# Patient Record
Sex: MALE | Race: WHITE | URBAN - METROPOLITAN AREA
[De-identification: names, ages, dates, MRNs, and addresses within clinical notes are randomized per-mention and may not be internally consistent; named-entity substitution may affect disease eponyms.]

---

## 2017-01-01 ENCOUNTER — HOSPITAL ENCOUNTER (INPATIENT)
Facility: CLINIC | Age: 82
LOS: 5 days | Discharge: NURSING, REHAB OR LONG-TERM CARE | DRG: 865 | End: 2018-01-03
Attending: EMERGENCY MEDICINE | Admitting: INTERNAL MEDICINE
Payer: COMMERCIAL

## 2017-01-01 ENCOUNTER — RECORDS - HEALTHEAST (OUTPATIENT)
Dept: LAB | Facility: CLINIC | Age: 82
End: 2017-01-01

## 2017-01-01 ENCOUNTER — APPOINTMENT (OUTPATIENT)
Dept: GENERAL RADIOLOGY | Facility: CLINIC | Age: 82
DRG: 865 | End: 2017-01-01
Attending: EMERGENCY MEDICINE
Payer: COMMERCIAL

## 2017-01-01 DIAGNOSIS — E11.8 TYPE 2 DIABETES MELLITUS WITH COMPLICATION, WITHOUT LONG-TERM CURRENT USE OF INSULIN (H): Primary | ICD-10-CM

## 2017-01-01 DIAGNOSIS — J10.1 INFLUENZA A: ICD-10-CM

## 2017-01-01 LAB
ALBUMIN SERPL-MCNC: 2.9 G/DL (ref 3.4–5)
ALBUMIN UR-MCNC: NEGATIVE MG/DL
ALP SERPL-CCNC: 78 U/L (ref 40–150)
ALT SERPL W P-5'-P-CCNC: 14 U/L (ref 0–70)
ANION GAP SERPL CALCULATED.3IONS-SCNC: 4 MMOL/L (ref 3–14)
APPEARANCE UR: ABNORMAL
AST SERPL W P-5'-P-CCNC: 14 U/L (ref 0–45)
BACTERIA #/AREA URNS HPF: ABNORMAL /HPF
BASE EXCESS BLDA CALC-SCNC: 1.7 MMOL/L
BASOPHILS # BLD AUTO: 0.1 10E9/L (ref 0–0.2)
BASOPHILS NFR BLD AUTO: 0.7 %
BILIRUB SERPL-MCNC: 0.4 MG/DL (ref 0.2–1.3)
BILIRUB UR QL STRIP: NEGATIVE
BUN SERPL-MCNC: 35 MG/DL (ref 7–30)
C DIFF TOX B STL QL: NEGATIVE
CALCIUM SERPL-MCNC: 8.5 MG/DL (ref 8.5–10.1)
CHLORIDE SERPL-SCNC: 104 MMOL/L (ref 94–109)
CO2 BLDCOV-SCNC: 31 MMOL/L (ref 21–28)
CO2 SERPL-SCNC: 29 MMOL/L (ref 20–32)
COLOR UR AUTO: YELLOW
CREAT SERPL-MCNC: 1.17 MG/DL (ref 0.66–1.25)
DIFFERENTIAL METHOD BLD: ABNORMAL
EOSINOPHIL # BLD AUTO: 0.1 10E9/L (ref 0–0.7)
EOSINOPHIL NFR BLD AUTO: 0.9 %
ERYTHROCYTE [DISTWIDTH] IN BLOOD BY AUTOMATED COUNT: 12.4 % (ref 10–15)
FLUAV+FLUBV AG SPEC QL: NEGATIVE
FLUAV+FLUBV AG SPEC QL: POSITIVE
GFR SERPL CREATININE-BSD FRML MDRD: 59 ML/MIN/1.7M2
GLUCOSE BLDC GLUCOMTR-MCNC: 133 MG/DL (ref 70–99)
GLUCOSE BLDC GLUCOMTR-MCNC: 135 MG/DL (ref 70–99)
GLUCOSE BLDC GLUCOMTR-MCNC: 170 MG/DL (ref 70–99)
GLUCOSE BLDC GLUCOMTR-MCNC: 180 MG/DL (ref 70–99)
GLUCOSE BLDC GLUCOMTR-MCNC: 186 MG/DL (ref 70–99)
GLUCOSE BLDC GLUCOMTR-MCNC: 192 MG/DL (ref 70–99)
GLUCOSE BLDC GLUCOMTR-MCNC: 205 MG/DL (ref 70–99)
GLUCOSE BLDC GLUCOMTR-MCNC: 206 MG/DL (ref 70–99)
GLUCOSE BLDC GLUCOMTR-MCNC: 212 MG/DL (ref 70–99)
GLUCOSE BLDC GLUCOMTR-MCNC: 221 MG/DL (ref 70–99)
GLUCOSE BLDC GLUCOMTR-MCNC: 227 MG/DL (ref 70–99)
GLUCOSE BLDC GLUCOMTR-MCNC: 232 MG/DL (ref 70–99)
GLUCOSE BLDC GLUCOMTR-MCNC: 240 MG/DL (ref 70–99)
GLUCOSE BLDC GLUCOMTR-MCNC: 257 MG/DL (ref 70–99)
GLUCOSE BLDC GLUCOMTR-MCNC: 270 MG/DL (ref 70–99)
GLUCOSE BLDC GLUCOMTR-MCNC: 287 MG/DL (ref 70–99)
GLUCOSE BLDC GLUCOMTR-MCNC: 311 MG/DL (ref 70–99)
GLUCOSE BLDC GLUCOMTR-MCNC: 321 MG/DL (ref 70–99)
GLUCOSE SERPL-MCNC: 217 MG/DL (ref 70–99)
GLUCOSE UR STRIP-MCNC: NEGATIVE MG/DL
HBA1C MFR BLD: 6.6 % (ref 4.2–6.1)
HBA1C MFR BLD: 8.4 % (ref 4.3–6)
HCO3 BLD-SCNC: 27 MMOL/L (ref 21–28)
HCT VFR BLD AUTO: 39 % (ref 40–53)
HGB BLD-MCNC: 12.8 G/DL (ref 13.3–17.7)
HGB UR QL STRIP: ABNORMAL
IMM GRANULOCYTES # BLD: 0.1 10E9/L (ref 0–0.4)
IMM GRANULOCYTES NFR BLD: 0.9 %
INR PPP: 1.06 (ref 0.86–1.14)
INTERPRETATION ECG - MUSE: NORMAL
KETONES UR STRIP-MCNC: NEGATIVE MG/DL
LACTATE BLD-SCNC: 1.3 MMOL/L (ref 0.7–2.1)
LACTATE BLD-SCNC: 1.4 MMOL/L (ref 0.7–2)
LEUKOCYTE ESTERASE UR QL STRIP: ABNORMAL
LYMPHOCYTES # BLD AUTO: 0.8 10E9/L (ref 0.8–5.3)
LYMPHOCYTES NFR BLD AUTO: 5.9 %
MAGNESIUM SERPL-MCNC: 2 MG/DL (ref 1.6–2.3)
MCH RBC QN AUTO: 31.4 PG (ref 26.5–33)
MCHC RBC AUTO-ENTMCNC: 32.8 G/DL (ref 31.5–36.5)
MCV RBC AUTO: 96 FL (ref 78–100)
MONOCYTES # BLD AUTO: 1.5 10E9/L (ref 0–1.3)
MONOCYTES NFR BLD AUTO: 10.7 %
MRSA DNA SPEC QL NAA+PROBE: NEGATIVE
MUCOUS THREADS #/AREA URNS LPF: PRESENT /LPF
NEUTROPHILS # BLD AUTO: 11.1 10E9/L (ref 1.6–8.3)
NEUTROPHILS NFR BLD AUTO: 80.9 %
NITRATE UR QL: POSITIVE
NRBC # BLD AUTO: 0 10*3/UL
NRBC BLD AUTO-RTO: 0 /100
O2/TOTAL GAS SETTING VFR VENT: NORMAL %
OXYHGB MFR BLD: 95 % (ref 92–100)
PCO2 BLD: 42 MM HG (ref 35–45)
PCO2 BLDV: 46 MM HG (ref 40–50)
PH BLD: 7.41 PH (ref 7.35–7.45)
PH BLDV: 7.44 PH (ref 7.32–7.43)
PH UR STRIP: 5 PH (ref 5–7)
PHOSPHATE SERPL-MCNC: 3.1 MG/DL (ref 2.5–4.5)
PLATELET # BLD AUTO: 382 10E9/L (ref 150–450)
PO2 BLD: 81 MM HG (ref 80–105)
PO2 BLDV: 22 MM HG (ref 25–47)
POTASSIUM SERPL-SCNC: 4.5 MMOL/L (ref 3.4–5.3)
PROT SERPL-MCNC: 6.9 G/DL (ref 6.8–8.8)
RBC # BLD AUTO: 4.08 10E12/L (ref 4.4–5.9)
RBC #/AREA URNS AUTO: 16 /HPF (ref 0–2)
SAO2 % BLDV FROM PO2: 40 %
SODIUM SERPL-SCNC: 137 MMOL/L (ref 133–144)
SOURCE: ABNORMAL
SP GR UR STRIP: 1.03 (ref 1–1.03)
SPECIMEN SOURCE: ABNORMAL
SPECIMEN SOURCE: NORMAL
SPECIMEN SOURCE: NORMAL
TROPONIN I SERPL-MCNC: 0.04 UG/L (ref 0–0.04)
UROBILINOGEN UR STRIP-MCNC: 0 MG/DL (ref 0–2)
WBC # BLD AUTO: 13.7 10E9/L (ref 4–11)
WBC #/AREA URNS AUTO: 149 /HPF (ref 0–2)

## 2017-01-01 PROCEDURE — 85610 PROTHROMBIN TIME: CPT | Performed by: EMERGENCY MEDICINE

## 2017-01-01 PROCEDURE — 25000128 H RX IP 250 OP 636: Performed by: INTERNAL MEDICINE

## 2017-01-01 PROCEDURE — 25800025 ZZH RX 258: Performed by: INTERNAL MEDICINE

## 2017-01-01 PROCEDURE — 99233 SBSQ HOSP IP/OBS HIGH 50: CPT | Performed by: INTERNAL MEDICINE

## 2017-01-01 PROCEDURE — 40000275 ZZH STATISTIC RCP TIME EA 10 MIN

## 2017-01-01 PROCEDURE — 87493 C DIFF AMPLIFIED PROBE: CPT | Performed by: INTERNAL MEDICINE

## 2017-01-01 PROCEDURE — 94660 CPAP INITIATION&MGMT: CPT

## 2017-01-01 PROCEDURE — 87186 SC STD MICRODIL/AGAR DIL: CPT | Performed by: INTERNAL MEDICINE

## 2017-01-01 PROCEDURE — 20000003 ZZH R&B ICU

## 2017-01-01 PROCEDURE — 12000007 ZZH R&B INTERMEDIATE

## 2017-01-01 PROCEDURE — 87040 BLOOD CULTURE FOR BACTERIA: CPT | Performed by: EMERGENCY MEDICINE

## 2017-01-01 PROCEDURE — 87640 STAPH A DNA AMP PROBE: CPT | Performed by: INTERNAL MEDICINE

## 2017-01-01 PROCEDURE — 99207 ZZC APP CREDIT; MD BILLING SHARED VISIT: CPT | Performed by: INTERNAL MEDICINE

## 2017-01-01 PROCEDURE — 87086 URINE CULTURE/COLONY COUNT: CPT | Performed by: INTERNAL MEDICINE

## 2017-01-01 PROCEDURE — 81001 URINALYSIS AUTO W/SCOPE: CPT | Performed by: INTERNAL MEDICINE

## 2017-01-01 PROCEDURE — 94640 AIRWAY INHALATION TREATMENT: CPT

## 2017-01-01 PROCEDURE — 36600 WITHDRAWAL OF ARTERIAL BLOOD: CPT

## 2017-01-01 PROCEDURE — 94640 AIRWAY INHALATION TREATMENT: CPT | Mod: 76

## 2017-01-01 PROCEDURE — 87641 MR-STAPH DNA AMP PROBE: CPT | Performed by: INTERNAL MEDICINE

## 2017-01-01 PROCEDURE — 82803 BLOOD GASES ANY COMBINATION: CPT

## 2017-01-01 PROCEDURE — 25000131 ZZH RX MED GY IP 250 OP 636 PS 637: Performed by: INTERNAL MEDICINE

## 2017-01-01 PROCEDURE — 40000274 ZZH STATISTIC RCP CONSULT EA 30 MIN

## 2017-01-01 PROCEDURE — 87088 URINE BACTERIA CULTURE: CPT | Performed by: INTERNAL MEDICINE

## 2017-01-01 PROCEDURE — 99223 1ST HOSP IP/OBS HIGH 75: CPT | Mod: AI | Performed by: INTERNAL MEDICINE

## 2017-01-01 PROCEDURE — 25000132 ZZH RX MED GY IP 250 OP 250 PS 637: Performed by: INTERNAL MEDICINE

## 2017-01-01 PROCEDURE — 96360 HYDRATION IV INFUSION INIT: CPT

## 2017-01-01 PROCEDURE — 00000146 ZZHCL STATISTIC GLUCOSE BY METER IP

## 2017-01-01 PROCEDURE — 83605 ASSAY OF LACTIC ACID: CPT

## 2017-01-01 PROCEDURE — 25000128 H RX IP 250 OP 636: Performed by: EMERGENCY MEDICINE

## 2017-01-01 PROCEDURE — 25000125 ZZHC RX 250: Performed by: INTERNAL MEDICINE

## 2017-01-01 PROCEDURE — 93005 ELECTROCARDIOGRAM TRACING: CPT

## 2017-01-01 PROCEDURE — 82805 BLOOD GASES W/O2 SATURATION: CPT | Performed by: EMERGENCY MEDICINE

## 2017-01-01 PROCEDURE — 71010 XR CHEST 1 VW: CPT

## 2017-01-01 PROCEDURE — 83605 ASSAY OF LACTIC ACID: CPT | Performed by: EMERGENCY MEDICINE

## 2017-01-01 PROCEDURE — 40000281 ZZH STATISTIC TRANSPORT TIME EA 15 MIN

## 2017-01-01 PROCEDURE — 99207 ZZC CDG-CRITICAL CARE TIME NOT DOCUMENTED: CPT | Performed by: INTERNAL MEDICINE

## 2017-01-01 PROCEDURE — 36415 COLL VENOUS BLD VENIPUNCTURE: CPT | Performed by: EMERGENCY MEDICINE

## 2017-01-01 PROCEDURE — 87804 INFLUENZA ASSAY W/OPTIC: CPT | Performed by: EMERGENCY MEDICINE

## 2017-01-01 PROCEDURE — 99285 EMERGENCY DEPT VISIT HI MDM: CPT | Mod: 25

## 2017-01-01 PROCEDURE — 93010 ELECTROCARDIOGRAM REPORT: CPT | Performed by: INTERNAL MEDICINE

## 2017-01-01 RX ORDER — ALBUTEROL SULFATE 0.83 MG/ML
2.5 SOLUTION RESPIRATORY (INHALATION) EVERY 4 HOURS PRN
COMMUNITY

## 2017-01-01 RX ORDER — CIPROFLOXACIN 250 MG/1
250 TABLET, FILM COATED ORAL 2 TIMES DAILY
Status: DISCONTINUED | OUTPATIENT
Start: 2017-01-01 | End: 2018-01-01

## 2017-01-01 RX ORDER — PYRIDOSTIGMINE BROMIDE 180 MG/1
180 TABLET, EXTENDED RELEASE ORAL 2 TIMES DAILY
Status: DISCONTINUED | OUTPATIENT
Start: 2017-01-01 | End: 2017-01-01 | Stop reason: RX

## 2017-01-01 RX ORDER — ACETAMINOPHEN 650 MG/1
650 SUPPOSITORY RECTAL EVERY 4 HOURS PRN
Status: DISCONTINUED | OUTPATIENT
Start: 2017-01-01 | End: 2017-01-01

## 2017-01-01 RX ORDER — DEXTROSE MONOHYDRATE 25 G/50ML
25-50 INJECTION, SOLUTION INTRAVENOUS
Status: DISCONTINUED | OUTPATIENT
Start: 2017-01-01 | End: 2018-01-01

## 2017-01-01 RX ORDER — DEXTROSE MONOHYDRATE, SODIUM CHLORIDE, AND POTASSIUM CHLORIDE 50; 1.49; 4.5 G/1000ML; G/1000ML; G/1000ML
INJECTION, SOLUTION INTRAVENOUS CONTINUOUS
Status: DISCONTINUED | OUTPATIENT
Start: 2017-01-01 | End: 2017-01-01

## 2017-01-01 RX ORDER — ALBUTEROL SULFATE 90 UG/1
2 AEROSOL, METERED RESPIRATORY (INHALATION) EVERY 4 HOURS PRN
Status: ON HOLD | COMMUNITY
End: 2018-01-01

## 2017-01-01 RX ORDER — METHYLPREDNISOLONE SODIUM SUCCINATE 40 MG/ML
10 INJECTION, POWDER, LYOPHILIZED, FOR SOLUTION INTRAMUSCULAR; INTRAVENOUS DAILY
Status: DISCONTINUED | OUTPATIENT
Start: 2017-01-01 | End: 2017-01-01

## 2017-01-01 RX ORDER — ACETAMINOPHEN 325 MG/1
650 TABLET ORAL EVERY 4 HOURS PRN
Status: DISCONTINUED | OUTPATIENT
Start: 2017-01-01 | End: 2018-01-01 | Stop reason: HOSPADM

## 2017-01-01 RX ORDER — ONDANSETRON 2 MG/ML
4 INJECTION INTRAMUSCULAR; INTRAVENOUS EVERY 6 HOURS PRN
Status: DISCONTINUED | OUTPATIENT
Start: 2017-01-01 | End: 2018-01-01 | Stop reason: HOSPADM

## 2017-01-01 RX ORDER — OSELTAMIVIR PHOSPHATE 30 MG/1
30 CAPSULE ORAL 2 TIMES DAILY
Status: DISCONTINUED | OUTPATIENT
Start: 2017-01-01 | End: 2018-01-01

## 2017-01-01 RX ORDER — ONDANSETRON 4 MG/1
4 TABLET, ORALLY DISINTEGRATING ORAL EVERY 6 HOURS PRN
Status: DISCONTINUED | OUTPATIENT
Start: 2017-01-01 | End: 2018-01-01 | Stop reason: HOSPADM

## 2017-01-01 RX ORDER — NALOXONE HYDROCHLORIDE 0.4 MG/ML
.1-.4 INJECTION, SOLUTION INTRAMUSCULAR; INTRAVENOUS; SUBCUTANEOUS
Status: DISCONTINUED | OUTPATIENT
Start: 2017-01-01 | End: 2018-01-01 | Stop reason: HOSPADM

## 2017-01-01 RX ORDER — IPRATROPIUM BROMIDE AND ALBUTEROL SULFATE 2.5; .5 MG/3ML; MG/3ML
3 SOLUTION RESPIRATORY (INHALATION) EVERY 6 HOURS PRN
Status: DISCONTINUED | OUTPATIENT
Start: 2017-01-01 | End: 2018-01-01 | Stop reason: HOSPADM

## 2017-01-01 RX ORDER — ALBUTEROL SULFATE 0.83 MG/ML
2.5 SOLUTION RESPIRATORY (INHALATION)
Status: DISCONTINUED | OUTPATIENT
Start: 2017-01-01 | End: 2018-01-01 | Stop reason: HOSPADM

## 2017-01-01 RX ORDER — PYRIDOSTIGMINE BROMIDE 180 MG/1
180 TABLET, EXTENDED RELEASE ORAL 2 TIMES DAILY
Status: DISCONTINUED | OUTPATIENT
Start: 2017-01-01 | End: 2018-01-01 | Stop reason: HOSPADM

## 2017-01-01 RX ORDER — NICOTINE POLACRILEX 4 MG
15-30 LOZENGE BUCCAL
Status: DISCONTINUED | OUTPATIENT
Start: 2017-01-01 | End: 2018-01-01

## 2017-01-01 RX ORDER — CIPROFLOXACIN 2 MG/ML
200 INJECTION, SOLUTION INTRAVENOUS EVERY 12 HOURS
Status: DISCONTINUED | OUTPATIENT
Start: 2017-01-01 | End: 2017-01-01

## 2017-01-01 RX ORDER — IPRATROPIUM BROMIDE AND ALBUTEROL SULFATE 2.5; .5 MG/3ML; MG/3ML
3 SOLUTION RESPIRATORY (INHALATION)
Status: DISCONTINUED | OUTPATIENT
Start: 2017-01-01 | End: 2017-01-01

## 2017-01-01 RX ADMIN — OSELTAMIVIR PHOSPHATE 30 MG: 30 CAPSULE ORAL at 22:06

## 2017-01-01 RX ADMIN — CIPROFLOXACIN 200 MG: 2 INJECTION, SOLUTION INTRAVENOUS at 22:30

## 2017-01-01 RX ADMIN — IPRATROPIUM BROMIDE AND ALBUTEROL SULFATE 3 ML: .5; 3 SOLUTION RESPIRATORY (INHALATION) at 15:33

## 2017-01-01 RX ADMIN — CIPROFLOXACIN HYDROCHLORIDE 250 MG: 250 TABLET, FILM COATED ORAL at 11:46

## 2017-01-01 RX ADMIN — IPRATROPIUM BROMIDE AND ALBUTEROL SULFATE 3 ML: .5; 3 SOLUTION RESPIRATORY (INHALATION) at 15:10

## 2017-01-01 RX ADMIN — INSULIN ASPART 5 UNITS: 100 INJECTION, SOLUTION INTRAVENOUS; SUBCUTANEOUS at 20:06

## 2017-01-01 RX ADMIN — ENOXAPARIN SODIUM 40 MG: 40 INJECTION SUBCUTANEOUS at 10:33

## 2017-01-01 RX ADMIN — INSULIN ASPART 9 UNITS: 100 INJECTION, SOLUTION INTRAVENOUS; SUBCUTANEOUS at 16:01

## 2017-01-01 RX ADMIN — IPRATROPIUM BROMIDE AND ALBUTEROL SULFATE 3 ML: .5; 3 SOLUTION RESPIRATORY (INHALATION) at 08:07

## 2017-01-01 RX ADMIN — IPRATROPIUM BROMIDE AND ALBUTEROL SULFATE 3 ML: .5; 3 SOLUTION RESPIRATORY (INHALATION) at 11:27

## 2017-01-01 RX ADMIN — OSELTAMIVIR PHOSPHATE 30 MG: 30 CAPSULE ORAL at 10:35

## 2017-01-01 RX ADMIN — CIPROFLOXACIN 200 MG: 2 INJECTION, SOLUTION INTRAVENOUS at 22:58

## 2017-01-01 RX ADMIN — INSULIN ASPART 6 UNITS: 100 INJECTION, SOLUTION INTRAVENOUS; SUBCUTANEOUS at 21:51

## 2017-01-01 RX ADMIN — INSULIN ASPART 10 UNITS: 100 INJECTION, SOLUTION INTRAVENOUS; SUBCUTANEOUS at 12:18

## 2017-01-01 RX ADMIN — METHYLPREDNISOLONE SODIUM SUCCINATE 10 MG: 40 INJECTION, POWDER, FOR SOLUTION INTRAMUSCULAR; INTRAVENOUS at 07:50

## 2017-01-01 RX ADMIN — IPRATROPIUM BROMIDE AND ALBUTEROL SULFATE 3 ML: .5; 3 SOLUTION RESPIRATORY (INHALATION) at 07:25

## 2017-01-01 RX ADMIN — INSULIN ASPART 4 UNITS: 100 INJECTION, SOLUTION INTRAVENOUS; SUBCUTANEOUS at 11:57

## 2017-01-01 RX ADMIN — PYRIDOSTIGMINE BROMIDE 180 MG: 180 TABLET, EXTENDED RELEASE ORAL at 21:50

## 2017-01-01 RX ADMIN — INSULIN ASPART 4 UNITS: 100 INJECTION, SOLUTION INTRAVENOUS; SUBCUTANEOUS at 16:48

## 2017-01-01 RX ADMIN — PYRIDOSTIGMINE BROMIDE 180 MG: 180 TABLET, EXTENDED RELEASE ORAL at 07:53

## 2017-01-01 RX ADMIN — INSULIN ASPART 3 UNITS: 100 INJECTION, SOLUTION INTRAVENOUS; SUBCUTANEOUS at 04:39

## 2017-01-01 RX ADMIN — ENOXAPARIN SODIUM 40 MG: 40 INJECTION SUBCUTANEOUS at 11:48

## 2017-01-01 RX ADMIN — IPRATROPIUM BROMIDE AND ALBUTEROL SULFATE 3 ML: .5; 3 SOLUTION RESPIRATORY (INHALATION) at 19:25

## 2017-01-01 RX ADMIN — POTASSIUM CHLORIDE, DEXTROSE MONOHYDRATE AND SODIUM CHLORIDE: 150; 5; 450 INJECTION, SOLUTION INTRAVENOUS at 01:33

## 2017-01-01 RX ADMIN — SODIUM CHLORIDE 1000 ML: 9 INJECTION, SOLUTION INTRAVENOUS at 05:33

## 2017-01-01 RX ADMIN — POTASSIUM CHLORIDE, DEXTROSE MONOHYDRATE AND SODIUM CHLORIDE: 150; 5; 450 INJECTION, SOLUTION INTRAVENOUS at 23:37

## 2017-01-01 RX ADMIN — IPRATROPIUM BROMIDE AND ALBUTEROL SULFATE 3 ML: .5; 3 SOLUTION RESPIRATORY (INHALATION) at 19:55

## 2017-01-01 RX ADMIN — CIPROFLOXACIN HYDROCHLORIDE 250 MG: 250 TABLET, FILM COATED ORAL at 21:53

## 2017-01-01 RX ADMIN — POTASSIUM CHLORIDE, DEXTROSE MONOHYDRATE AND SODIUM CHLORIDE: 150; 5; 450 INJECTION, SOLUTION INTRAVENOUS at 10:15

## 2017-01-01 RX ADMIN — METHYLPREDNISOLONE SODIUM SUCCINATE 10 MG: 40 INJECTION, POWDER, FOR SOLUTION INTRAMUSCULAR; INTRAVENOUS at 10:31

## 2017-01-01 RX ADMIN — OSELTAMIVIR PHOSPHATE 30 MG: 30 CAPSULE ORAL at 21:50

## 2017-01-01 RX ADMIN — INSULIN ASPART 7 UNITS: 100 INJECTION, SOLUTION INTRAVENOUS; SUBCUTANEOUS at 11:51

## 2017-01-01 RX ADMIN — PREDNISONE 15 MG: 10 TABLET ORAL at 11:46

## 2017-01-01 RX ADMIN — CIPROFLOXACIN 200 MG: 2 INJECTION, SOLUTION INTRAVENOUS at 10:33

## 2017-01-01 RX ADMIN — METHYLPREDNISOLONE SODIUM SUCCINATE 10 MG: 40 INJECTION, POWDER, FOR SOLUTION INTRAMUSCULAR; INTRAVENOUS at 08:04

## 2017-01-01 RX ADMIN — ENOXAPARIN SODIUM 40 MG: 40 INJECTION SUBCUTANEOUS at 11:46

## 2017-01-01 RX ADMIN — INSULIN GLARGINE 8 UNITS: 100 INJECTION, SOLUTION SUBCUTANEOUS at 11:49

## 2017-01-01 RX ADMIN — INSULIN ASPART 4 UNITS: 100 INJECTION, SOLUTION INTRAVENOUS; SUBCUTANEOUS at 20:02

## 2017-01-01 RX ADMIN — PYRIDOSTIGMINE BROMIDE 180 MG: 180 TABLET, EXTENDED RELEASE ORAL at 21:13

## 2017-01-01 RX ADMIN — INSULIN ASPART 3 UNITS: 100 INJECTION, SOLUTION INTRAVENOUS; SUBCUTANEOUS at 23:53

## 2017-01-01 RX ADMIN — INSULIN GLARGINE 8 UNITS: 100 INJECTION, SOLUTION SUBCUTANEOUS at 07:51

## 2017-01-01 RX ADMIN — ACETAMINOPHEN 650 MG: 650 SUPPOSITORY RECTAL at 11:48

## 2017-01-01 RX ADMIN — OSELTAMIVIR PHOSPHATE 30 MG: 30 CAPSULE ORAL at 07:53

## 2017-01-01 RX ADMIN — POTASSIUM CHLORIDE, DEXTROSE MONOHYDRATE AND SODIUM CHLORIDE: 150; 5; 450 INJECTION, SOLUTION INTRAVENOUS at 10:33

## 2017-01-01 RX ADMIN — PYRIDOSTIGMINE BROMIDE 180 MG: 180 TABLET, EXTENDED RELEASE ORAL at 22:06

## 2017-01-01 RX ADMIN — INSULIN ASPART 2 UNITS: 100 INJECTION, SOLUTION INTRAVENOUS; SUBCUTANEOUS at 00:33

## 2017-01-01 RX ADMIN — IPRATROPIUM BROMIDE AND ALBUTEROL SULFATE 3 ML: .5; 3 SOLUTION RESPIRATORY (INHALATION) at 11:13

## 2017-01-01 RX ADMIN — INSULIN ASPART 2 UNITS: 100 INJECTION, SOLUTION INTRAVENOUS; SUBCUTANEOUS at 07:51

## 2017-01-01 RX ADMIN — OSELTAMIVIR PHOSPHATE 30 MG: 30 CAPSULE ORAL at 21:13

## 2017-01-01 RX ADMIN — PYRIDOSTIGMINE BROMIDE 180 MG: 180 TABLET, EXTENDED RELEASE ORAL at 10:34

## 2017-01-01 RX ADMIN — CIPROFLOXACIN 200 MG: 2 INJECTION, SOLUTION INTRAVENOUS at 11:55

## 2017-01-01 RX ADMIN — OSELTAMIVIR PHOSPHATE 30 MG: 30 CAPSULE ORAL at 08:04

## 2017-01-01 RX ADMIN — PYRIDOSTIGMINE BROMIDE 180 MG: 180 TABLET, EXTENDED RELEASE ORAL at 08:04

## 2017-01-01 RX ADMIN — INSULIN GLARGINE 12 UNITS: 100 INJECTION, SOLUTION SUBCUTANEOUS at 07:51

## 2017-01-01 RX ADMIN — IPRATROPIUM BROMIDE AND ALBUTEROL SULFATE 3 ML: .5; 3 SOLUTION RESPIRATORY (INHALATION) at 11:52

## 2017-01-01 ASSESSMENT — ACTIVITIES OF DAILY LIVING (ADL)
DRESS: 2-->ASSISTIVE PERSON
RETIRED_COMMUNICATION: 0-->UNDERSTANDS/COMMUNICATES WITHOUT DIFFICULTY
SWALLOWING: 0-->SWALLOWS FOODS/LIQUIDS WITHOUT DIFFICULTY
AMBULATION: 1-->ASSISTIVE EQUIPMENT
TRANSFERRING: 1-->ASSISTIVE EQUIPMENT
BATHING: 2-->ASSISTIVE PERSON
WHICH_OF_THE_ABOVE_FUNCTIONAL_RISKS_HAD_A_RECENT_ONSET_OR_CHANGE?: AMBULATION;TRANSFERRING
ADLS_ACUITY_SCORE: 22
TOILETING: 1-->ASSISTIVE EQUIPMENT
COGNITION: 0 - NO COGNITION ISSUES REPORTED
RETIRED_EATING: 0-->INDEPENDENT
FALL_HISTORY_WITHIN_LAST_SIX_MONTHS: YES
ADLS_ACUITY_SCORE: 22

## 2017-12-29 PROBLEM — J96.01 ACUTE RESPIRATORY FAILURE WITH HYPOXIA AND HYPERCARBIA (H): Status: ACTIVE | Noted: 2017-01-01

## 2017-12-29 PROBLEM — J96.02 ACUTE RESPIRATORY FAILURE WITH HYPOXIA AND HYPERCARBIA (H): Status: ACTIVE | Noted: 2017-01-01

## 2017-12-29 NOTE — PROGRESS NOTES
IM Addendum  Patient is 86 year old man with MG now presenting with acute respiratory failure due to Influenza A.  On BIPAP and tolerating well, unable to do NIF but tidal volumes 480.  Urine suspicious for possible infection send cx and start Rocephin until results back.  Support with IV Solumedrol and start Duonebs and albuterol nebs prn (on MDI albuterol at home).  Follow glucoses and cover.  Rectal Tylenol for fever.    Lit Potts

## 2017-12-29 NOTE — PROGRESS NOTES
RT Note:    ABG drawn from patient's right radial artery without complications. Patient started on BiPAP 15/8 and 40%. RT will continue to monitor.    Princess Ramsay  December 29, 2017.6:36 AM

## 2017-12-29 NOTE — H&P
Dictation pending.     Mariano Clemente is a 86 year old man who came to attention from his NH for fever and cough. PMH is significant for Myasthemia gravis, and he notably was falling a lot, for which reason he was no longer able to stay at home.     I spoke with his wife by phone and although he is weak, she states that he is only mildly compromised from a memory standpoint. He had lower extremity involvement of his MG, as far as she knows.      Pt was found to have no evidence of pneumonia, but is Influenza A positive. He is very difficult to read, is relatively laconic and is probably just sleepy at the time that I met him. Initial attempt at BIPAP was not very successful in the ED. Wife clarifies that she would wnt to intubate him if needed for the short-term, if needed to get him through a period of MG crisis.     I spoke with Pulmonary medicine (Tino) and Neuro (Randy) by phone. Will plan to increase steroids only a little (high doses of steroids could be attempted if he is intubated, but he may decompensate if given too much steroid) and continue (consider advancing the dose of pyridostigmine to tid). Also reasonable to avoid antibiotics, as virtully all of the abx will also interfere with neuromuscular function.     ABG looks good, though VBG was at least mildly abnormal.     Dx:  Influenza A pneumonia.   Myasthenia grvis. Difficult to tell if pt is in crisis.     PLAN:  1. Admit to ICU.  2. BiPAP for support.   3. Solumedrol 10 mg daily.  4. Avoid most oral meds other than Pyridostigmine.   5. Tamiflu.  6. Consider intubation, as this could potentially be a short term commitment for this pt.   7. Neuro consult.

## 2017-12-29 NOTE — IP AVS SNAPSHOT
` ` Patient Information     Patient Name Sex     Mariano Clemente ([id number]) Male 10/6/1931       Room Bed    0347 0347-01      Patient Demographics     Address Phone    [address]  [facility] [address] [phone] (Home)  none (Work)  none (Mobile)      Patient Ethnicity & Race     Ethnic Group Patient Race     White      Emergency Contact(s)     Name Relation Home Work Mobile    Yu Clemente Spouse [phone] none [phone]    Danae Clemente Grandchild [phone]      Jhon Orozco Son [phone]      Demetrius Orozco Son   [phone]    Brant Clemente Son   [phone]      Documents on File        Status Date Received Description       Documents for the Patient    Insurance Card Received 12     External Medication Information Consent       Patient ID Received 12     Consent for Services - Hospital/Clinic Received () 12     Privacy Notice - [place] Received 17     Privacy Notice - [place] Received 12     Consent for EHR Access  13 Copied from existing Consent for services - C/HOD collected on 2012    [facility] Specified Other       Consent for Services/Privacy Notice - Hospital/Clinic Received () 16     HIM CHELI Authorization - File Only  08/10/16 [facility]    Power of  Received 16 POWER OF   12    Care Everywhere Prospective Auth Received 17     Privacy Notice - [place] Received (Deleted) 12        Documents for the Encounter    CMS IM for Patient Signature Received 17     CMS IM for Patient Signature Received 18 2nd IMM      Admission Information     Attending Provider Admitting Provider Admission Type Admission Date/Time    Manny Joy MD Parens, Karl R, MD Emergency 17  0431    Discharge Date Hospital Service Auth/Cert Status Service Area     Hospitalist [facility] SERVICES    Unit Room/Bed Admission Status        3 MEDICAL SURGICAL  0347/0347-01 Admission (Confirmed)       Admission     Complaint    Acute respiratory failure with hypoxia and hypercarbia (H)      Hospital Account     Name Acct ID Class Status Primary Coverage    Mariano Clemente [id number] Inpatient Open MEDICA - MEDICA [employer] [facility]            Guarantor Account (for Hospital Account #[id number])     Name Relation to Pt Service Area Active? Acct Type    Mariano Clemente  FCS Yes Personal/Family    Address Phone          [address] [place] [address] [phone](H)  none(O)              Coverage Information (for Hospital Account #[id number])     F/O Payor/Plan Precert #    MEDICA/MEDICA [employer] [facility] [place]     Subscriber Subscriber #    Mariano Clemente [id number]    Address Phone    [address] [address]  [place] [address] [phone]

## 2017-12-29 NOTE — PROGRESS NOTES
Infection Prevention:    Patient requires Droplet precautions because of Influenza. Please contact Infection Prevention with any questions/concerns at [phone].    Manda Galicia, ICP

## 2017-12-29 NOTE — PROGRESS NOTES
RT- Attempted NIF with patient, very poor effort. Best was -6, patient doesn't seem to grasp concept. Tried several different times, has a hard time sealing mouthpiece.

## 2017-12-29 NOTE — PROGRESS NOTES
"ICU End of Shift Summary.  For vital signs and complete assessments, please see documentation flowsheets.     Pertinent assessments: UA sent-MD notified, abx ordered. Neuro consult done-no changes at this time, cont to monitor secretions and resp status. VSS. Per discussion with MD-?flutter on tele-rate controlled. Took about 1hr break from bipap on 5L NC than back to bipap. UOP adequate. Responds approriately but wanting to \"sleep\". Temp max 101.6 today-rectal tylenol given.   Major Shift Events: See above  Plan (Upcoming Events): Monitor resp status. Cont bipap. Cont steroids and abx. Cont tamiflu.   Discharge/Transfer Needs: TBD    Bedside Shift Report Completed : yes  Bedside Safety Check Completed:yes        "

## 2017-12-29 NOTE — CONSULTS
[facility]    Neurology Consultation     Date of Admission:  12/29/2017  Date of Consult (When I saw the patient): 12/29/17    Assessment & Plan   Mariano Clemente is a 86 year old male who was admitted on 12/29/2017. I was asked to see the patient for shortness of breath, slurred speech, altered mental status in the setting of previously diagnosed myasthenia gravis.  He has been seen in our clinic previously, where his myasthenia has manifested primarily as generalized weakness, without significant bulbar symptoms.      While this does not preclude an bulbar manifestation as part of a myasthenic crisis, he is clinically doing relatively well, despite our inability to get accurate PFTs.  He is able to count to 30 without significant shortness of breath.  He denies any difficulties with secretion management.  He has a solid cough.  My inclination for now would be supportive treatment with NIV as needed, and continuing his current medications (Mestinon and Prednisone 10mg)  If his symptoms worsen, temporary intubation and PLEX vs IVIg could be considered as an additional measure.    I discussed the above diagnosis, assessment, and further testing with the patient.  Total time: 50  Minutes, with more than 50% of the time counseling the patient or coordination of care.   Neurocritical care time:  Patient is at high risk for acute neurological deterioration.    Tono Padilla MD    Code Status    Full Code        Reason for Consult   Reason for consult: I was asked by Dr. Joy to evaluate this patient for myasthenia gravis and difficulty with breathing.      Chief Complaint   Shortness of breath, weakness    History is obtained from the patient and the electronic medical chart.    History of Present Illness   Mariano Clemente is a 86 year old male who presents with cough and fever.  He was up all night the previous evening with symptoms that turned out to be influenza A including cough and fever.  He has a  history of myasthenia, primarily manifesting as more of a global weakness in the arms and legs, and without a very prominent bulbar component, at least historically.  There was some understandable concern that some of his current symptoms may be secondary to his underlying MG, especially as he reported difficulty with shortness of breath and secretions in the ED.  He was placed on BiPAP for NIV in the ED      When I talked to him today, his ICU nurse noted that he has significantly improved with respect to level of alertness, and he is able to answer brief straight forward questions and count to 30 out loud without obvious weakness.  While his speech is soft it is not necessarily nasal.  He denied any difficulty with shortness of breath or trouble swallowing.  He was able to wean off the BiPAP without significant difficulty.      Past Medical History   I have reviewed this patient's medical history and updated it with pertinent information if needed.   Past Medical History:   Diagnosis Date     Diabetes (H)      Myasthenia gravis (H)        Past Surgical History   I have reviewed this patient's surgical history and updated it with pertinent information if needed.  History reviewed. No pertinent surgical history.    Prior to Admission Medications   Prior to Admission Medications   Prescriptions Last Dose Informant Patient Reported? Taking?   Acetaminophen (TYLENOL PO) 12/29/2017 at 0300  Yes Yes   Sig: Take 1,000 mg by mouth every 8 hours   Acetaminophen (TYLENOL PO) Past Month at Unknown time  Yes Yes   Sig: Take 1,000 mg by mouth daily as needed for mild pain or fever   Clopidogrel Bisulfate (PLAVIX PO) 12/28/2017 at 0900 Daughter Yes Yes   Sig: Take 75 mg by mouth daily    GABAPENTIN PO 12/28/2017 at 0900 Daughter Yes Yes   Sig: Take 600 mg by mouth daily    GABAPENTIN PO 12/28/2017 at 2000 Daughter Yes Yes   Sig: Take 300 mg by mouth At Bedtime   PREDNISONE PO 12/28/2017 at 0900 Daughter Yes Yes   Sig: Take 5 mg  by mouth daily    albuterol (2.5 MG/3ML) 0.083% neb solution 12/29/2017 at 0300  Yes Yes   Sig: Take 2.5 mg by nebulization every 4 hours as needed for shortness of breath / dyspnea or wheezing   albuterol (PROAIR HFA/PROVENTIL HFA/VENTOLIN HFA) 108 (90 BASE) MCG/ACT Inhaler Past Month at Unknown time  Yes Yes   Sig: Inhale 2 puffs into the lungs every 4 hours as needed for shortness of breath / dyspnea or wheezing   pyridostigmine (MESTINON) 180 MG CR tablet 12/28/2017 at 2000 Daughter Yes Yes   Sig: Take 180 mg by mouth 2 times daily       Facility-Administered Medications: None     Allergies   Allergies   Allergen Reactions     Asa [Aspirin]      Penicillins      [place]        Social History   I have reviewed this patient's social history and updated it with pertinent information if needed. Mariano Clemente  reports that he has quit smoking. He does not have any smokeless tobacco history on file. He reports that he does not drink alcohol.    Family History   I have reviewed this patient's family history and updated it with pertinent information if needed.   No family history on file.    Review of Systems   The 10 point Review of Systems is negative other than noted in the HPI or here.     Physical Exam   Temp: 100.3  F (37.9  C) Temp src: Axillary BP: 107/49 Pulse: 113 Heart Rate: 68 Resp: 20 SpO2: 96 % O2 Device: BiPAP/CPAP Oxygen Delivery: 4 LPM  Vital Signs with Ranges  Temp:  [100.3  F (37.9  C)-101.6  F (38.7  C)] 100.3  F (37.9  C)  Pulse:  [113] 113  Heart Rate:  [] 68  Resp:  [20-27] 20  BP: (101-154)/(41-77) 107/49  FiO2 (%):  [35 %-40 %] 35 %  SpO2:  [86 %-100 %] 96 %  184 lbs 4.87 oz    General Appearance:  No acute distress  Neuro:       Mental Status Exam:    Awake, sleepy but arouses easily, speech fluent and appropriate       Cranial Nerves:   CN II-XII intact.  No obvious ptosis.  Palate rise is symmetric.  Cough is intact           Motor:   Good strength in proximal and distal upper and  lower extremities.  No obvious neck flexor or extensor weakness           Reflexes:  Intact, symmetric       Sensory:  Grossly intact                   Coordination:   Grossly intact       Gait:  Deferred due to medical status     CV: RRR        Data   Results for orders placed or performed during the hospital encounter of 12/29/17 (from the past 24 hour(s))   Influenza A/B antigen   Result Value Ref Range    Influenza A/B Agn Specimen Nasopharyngeal     Influenza A Positive (A) NEG^Negative    Influenza B Negative NEG^Negative   EKG 12 lead   Result Value Ref Range    Interpretation ECG Click View Image link to view waveform and result    Glucose by meter   Result Value Ref Range    Glucose 212 (H) 70 - 99 mg/dL   CBC with platelets differential   Result Value Ref Range    WBC 13.7 (H) 4.0 - 11.0 10e9/L    RBC Count 4.08 (L) 4.4 - 5.9 10e12/L    Hemoglobin 12.8 (L) 13.3 - 17.7 g/dL    Hematocrit 39.0 (L) 40.0 - 53.0 %    MCV 96 78 - 100 fl    MCH 31.4 26.5 - 33.0 pg    MCHC 32.8 31.5 - 36.5 g/dL    RDW 12.4 10.0 - 15.0 %    Platelet Count 382 150 - 450 10e9/L    Diff Method Automated Method     % Neutrophils 80.9 %    % Lymphocytes 5.9 %    % Monocytes 10.7 %    % Eosinophils 0.9 %    % Basophils 0.7 %    % Immature Granulocytes 0.9 %    Nucleated RBCs 0 0 /100    Absolute Neutrophil 11.1 (H) 1.6 - 8.3 10e9/L    Absolute Lymphocytes 0.8 0.8 - 5.3 10e9/L    Absolute Monocytes 1.5 (H) 0.0 - 1.3 10e9/L    Absolute Eosinophils 0.1 0.0 - 0.7 10e9/L    Absolute Basophils 0.1 0.0 - 0.2 10e9/L    Abs Immature Granulocytes 0.1 0 - 0.4 10e9/L    Absolute Nucleated RBC 0.0    Lactic acid whole blood   Result Value Ref Range    Lactic Acid 1.4 0.7 - 2.0 mmol/L   Troponin I   Result Value Ref Range    Troponin I ES 0.037 0.000 - 0.045 ug/L   Comprehensive metabolic panel   Result Value Ref Range    Sodium 137 133 - 144 mmol/L    Potassium 4.5 3.4 - 5.3 mmol/L    Chloride 104 94 - 109 mmol/L    Carbon Dioxide 29 20 - 32 mmol/L     Anion Gap 4 3 - 14 mmol/L    Glucose 217 (H) 70 - 99 mg/dL    Urea Nitrogen 35 (H) 7 - 30 mg/dL    Creatinine 1.17 0.66 - 1.25 mg/dL    GFR Estimate 59 (L) >60 mL/min/1.7m2    GFR Estimate If Black 71 >60 mL/min/1.7m2    Calcium 8.5 8.5 - 10.1 mg/dL    Bilirubin Total 0.4 0.2 - 1.3 mg/dL    Albumin 2.9 (L) 3.4 - 5.0 g/dL    Protein Total 6.9 6.8 - 8.8 g/dL    Alkaline Phosphatase 78 40 - 150 U/L    ALT 14 0 - 70 U/L    AST 14 0 - 45 U/L   Magnesium   Result Value Ref Range    Magnesium 2.0 1.6 - 2.3 mg/dL   Phosphorus   Result Value Ref Range    Phosphorus 3.1 2.5 - 4.5 mg/dL   ISTAT gases lactate hector POCT   Result Value Ref Range    Ph Venous 7.44 (H) 7.32 - 7.43 pH    PCO2 Venous 46 40 - 50 mm Hg    PO2 Venous 22 (L) 25 - 47 mm Hg    Bicarbonate Venous 31 (H) 21 - 28 mmol/L    O2 Sat Venous 40 %    Lactic Acid 1.3 0.7 - 2.1 mmol/L   XR Chest 1 View    Narrative    XR CHEST 1 VW  12/29/2017 5:08 AM      HISTORY: Cough, fever.      COMPARISON: 8/5/2016.    FINDINGS: The heart is at the upper limits of normal in size without  pulmonary edema. Minimal atelectasis or scar at the left lung base.  The lungs are otherwise clear. No pneumothorax.      Impression    IMPRESSION: No acute abnormality.    TOMY HERNANDEZ MD   Blood culture ONE site   Result Value Ref Range    Specimen Description Blood Right Arm     Special Requests Aerobic and anaerobic bottles received     Culture Micro No growth after 7 hours    Blood culture   Result Value Ref Range    Specimen Description Blood Left Hand     Special Requests Aerobic and anaerobic bottles received     Culture Micro No growth after 7 hours    INR   Result Value Ref Range    INR 1.06 0.86 - 1.14   Blood gas arterial and oxyhgb   Result Value Ref Range    pH Arterial 7.41 7.35 - 7.45 pH    pCO2 Arterial 42 35 - 45 mm Hg    pO2 Arterial 81 80 - 105 mm Hg    Bicarbonate Arterial 27 21 - 28 mmol/L    FIO2 50%     Oxyhemoglobin Arterial 95 92 - 100 %    Base Excess Art 1.7  mmol/L   Methicillin Resistant Staph Aureus PCR   Result Value Ref Range    Specimen Description Nares     S Aur Meth Resis PCR Negative NEG^Negative   Glucose by meter   Result Value Ref Range    Glucose 206 (H) 70 - 99 mg/dL   UA with Microscopic   Result Value Ref Range    Color Urine Yellow     Appearance Urine Slightly Cloudy     Glucose Urine Negative NEG^Negative mg/dL    Bilirubin Urine Negative NEG^Negative    Ketones Urine Negative NEG^Negative mg/dL    Specific Gravity Urine 1.026 1.003 - 1.035    Blood Urine Moderate (A) NEG^Negative    pH Urine 5.0 5.0 - 7.0 pH    Protein Albumin Urine Negative NEG^Negative mg/dL    Urobilinogen mg/dL 0.0 0.0 - 2.0 mg/dL    Nitrite Urine Positive (A) NEG^Negative    Leukocyte Esterase Urine Large (A) NEG^Negative    Source Midstream Urine     WBC Urine 149 (H) 0 - 2 /HPF    RBC Urine 16 (H) 0 - 2 /HPF    Bacteria Urine Many (A) NEG^Negative /HPF    Mucous Urine Present (A) NEG^Negative /LPF   Urine Culture Aerobic Bacterial   Result Value Ref Range    Specimen Description Midstream Urine     Special Requests Specimen received in preservative     Culture Micro PENDING    Glucose by meter   Result Value Ref Range    Glucose 227 (H) 70 - 99 mg/dL           Imaging and procedures:  I personally reviewed these images.

## 2017-12-29 NOTE — ED NOTES
"[facility]  ED Nurse Handoff Report    Mariano Clemente is a 86 year old male   ED Chief complaint: Shortness of Breath  . ED Diagnosis:   Final diagnoses:   None     Allergies:   Allergies   Allergen Reactions     Asa [Aspirin]      Penicillins      Strawberry        Code Status: DNR / DNI  Activity level - Baseline/Home:  Total Care. Activity Level - Current:   Total Care. Lift room needed: Yes. Bariatric: No   Needed: No   Isolation: Yes. Infection: Not Applicable  Influenza.     Vital Signs:   Vitals:    12/29/17 0445 12/29/17 0519 12/29/17 0520 12/29/17 0530   BP: 118/69 129/70  133/69   Pulse:       Resp: 26  27 25   Temp:       TempSrc:       SpO2: 94%   94%   Weight:           Cardiac Rhythm:  ,      Pain level:    Patient confused: Yes. Patient Falls Risk: Yes.   Elimination Status: Has voided   Patient Report - Initial Complaint: brought in by ambulance for fever, SOB, cough from NH. Focused Assessment: Pt is febrile to touch, coughing, coarse lung sounds and hypoxic on room air, productive cough. Placed on 4 liters NC. Hx CVA and myasthenia gravis, pt unable to participate in care. Will occasionally respond verbally, uncertain what baseline is.   Tests Performed: Flu, xray, labs. Abnormal Results: positive influenza   Treatments provided: O2, fluids  Family Comments: Asked pt if he would like me to call his son he replied \"why he already knows\"  OBS brochure/video discussed/provided to patient:  N/A  ED Medications:   Medications   0.9% sodium chloride BOLUS (1,000 mLs Intravenous New Bag 12/29/17 0533)     Drips infusing:  No  For the majority of the shift, the patient's behavior Green. Interventions performed were N/A.     Severe Sepsis OR Septic Shock Diagnosis Present: yes      ED Nurse Name/Phone Number: Cheri Coe,   5:40 AM      "

## 2017-12-29 NOTE — PHARMACY-ADMISSION MEDICATION HISTORY
Admission medication history interview status for this patient is complete. See [facility] admission navigator for allergy information, prior to admission medications and immunization status.     Medication history interview source(s):[facility]  Medication history resources (including written lists, pill bottles, clinic record):MAR      Changes made to PTA medication list:  Added: all  Deleted: duloxitine, glipizide, lisinoprl, metoprolol, pravastatin, saxagliptin, sprivia  NOTE: called [facility] to verify that patient is NOT taking diabetic meds: ie  Saxagliptin and Glipizide: also NOT on their MAR    Actions taken by pharmacist (provider contacted, etc):None     Additional medication history information:None    Medication reconciliation/reorder completed by provider prior to medication history? No    Do you take OTC medications (eg tylenol, ibuprofen, fish oil, eye/ear drops, etc)? See list    For patients on insulin therapy: No        Prior to Admission medications    Medication Sig Last Dose Taking? Auth Provider   albuterol (2.5 MG/3ML) 0.083% neb solution Take 2.5 mg by nebulization every 4 hours as needed for shortness of breath / dyspnea or wheezing 12/29/2017 at 0300 Yes Unknown, Entered By History   albuterol (PROAIR HFA/PROVENTIL HFA/VENTOLIN HFA) 108 (90 BASE) MCG/ACT Inhaler Inhale 2 puffs into the lungs every 4 hours as needed for shortness of breath / dyspnea or wheezing Past Month at Unknown time Yes Unknown, Entered By History   Acetaminophen (TYLENOL PO) Take 1,000 mg by mouth every 8 hours 12/29/2017 at 0300 Yes Unknown, Entered By History   Acetaminophen (TYLENOL PO) Take 1,000 mg by mouth daily as needed for mild pain or fever Past Month at Unknown time Yes Unknown, Entered By History   Clopidogrel Bisulfate (PLAVIX PO) Take 75 mg by mouth daily  12/28/2017 at 0900 Yes Reported, Patient   pyridostigmine (MESTINON) 180 MG CR tablet Take 180 mg by mouth 2 times  daily  12/28/2017 at 2000 Yes Reported, Patient   GABAPENTIN PO Take 300 mg by mouth At Bedtime 12/28/2017 at 2000 Yes Unknown, Entered By History   PREDNISONE PO Take 5 mg by mouth daily  12/28/2017 at 0900 Yes Reported, Patient   GABAPENTIN PO Take 600 mg by mouth daily  12/28/2017 at 0900 Yes Reported, Patient

## 2017-12-29 NOTE — ED PROVIDER NOTES
"  History     Chief Complaint:  Shortness of Breath    The HPI is limited secondary to altered mental status.    HPI   Mariano Clemente is a 86 year old male with a history of cerebral infarction and diabetes who presents to the emergency department today via EMS for evaluation of shortness of breath and flu-like symptoms. EMS reports the patient was brought in for fever, cough, and shortness of breath. EMS administered 1000 mg of Tylenol with no relief in symptoms. The patient was unable to answer many questions due to altered mental status. Mental status is unknown to be at baseline or changed. The patient reports the month is November, is unsure of the current president, and does not know where he is.     Allergies:  Asa [Aspirin]  Penicillins  Strawberry    Medications:    Acetaminophen   Cymbalta  Pravastatin   Lisinopril   Metoprolol  Saxagliptin   Glipizide  Plavix  Spiriva  Mestinon  Gabapentin   Prednisone     Past Medical History:    Diabetes  Myasthenia gravis    Past Surgical History:    Surgical history reviewed. No pertinent surgical history.     Family History:    History reviewed. No pertinent family history.     Social History:  Smoking Status: Former Smoker  Alcohol Use: Negative   Marital Status:   [2]     Review of Systems   Unable to perform ROS: Mental status change     Physical Exam     Patient Vitals for the past 24 hrs:   BP Temp Temp src Pulse Heart Rate Resp SpO2 Height Weight   12/29/17 0806 124/71 100.6  F (38.1  C) Axillary - 73 - 97 % 1.83 m (6' 0.05\") 83.6 kg (184 lb 4.9 oz)   12/29/17 0743 127/63 - - - 82 20 97 % - -   12/29/17 0730 127/63 - - - 81 22 97 % - -   12/29/17 0715 125/61 - - - 79 23 97 % - -   12/29/17 0700 131/68 - - - 81 21 95 % - -   12/29/17 0645 148/68 - - - 75 - 97 % - -   12/29/17 0630 148/77 - - - 86 - 96 % - -   12/29/17 0615 141/58 - - - 93 - 95 % - -   12/29/17 0600 154/77 - - - 89 20 98 % - -   12/29/17 0553 - - - - 86 21 100 % - -   12/29/17 0545 148/65 - - " - 87 22 99 % - -   12/29/17 0530 133/69 - - - 92 25 94 % - -   12/29/17 0520 - - - - 98 27 - - -   12/29/17 0519 129/70 - - - - - - - -   12/29/17 0445 118/69 - - - 105 26 94 % - -   12/29/17 0434 124/61 - - - - - - - -   12/29/17 0433 - 101.3  F (38.5  C) Oral 113 113 22 (!) 86 % - 81.6 kg (180 lb)     Physical Exam  General:                    Laying on cart, tired appearing  Head:                                  The scalp, face, and head appear normal  Eyes:                                   Conjunctivae are normal  ENT:                                    The nose is normal                                              Pinnae are normal                                              External acoustic canals are normal, dry mucous membranes  Neck:                                  Trachea midline, good ROM  CV:                                      Pulses are normal, tachycardic, RR  Resp:                                  No respiratory distress, poor air movement L>R, no crackles or wheezes   Abdomen:      Soft, non-tender, non-distended  Musc:                                  Normal muscular tone                                              No major joint effusions                                              No asymmetric leg swelling                                              Good capillary refill noted  Skin:                                   No rash or lesions noted  Neuro:           Slow speech, able to follow simple commands, moves all extremities, not oriented to place or date, oriented to person     Emergency Department Course     ECG:  ECG taken at 0438, ECG read at 0440  Normal sinus rhythm  Left axis deviation   Low voltage QRS  Nonspecific ST abnormality   Abnormal ECG  Rate 100 bpm. ND interval 176 ms. QRS duration 90 ms. QT/QTc 324/417 ms. P-R-T axes 1 -38 45.    Imaging:  Radiology findings were communicated with the patient who voiced understanding of the findings.    XR Chest 1 View  No acute  abnormality.  TOMY HERNANDEZ MD  Reading per radiology    Laboratory:  Laboratory findings were communicated with the patient who voiced understanding of the findings.    Blood gas arterial and oxyhgb: WNL  Blood Culture (left hand): Negative   INR: 1.06  Blood Culture (right arm): Negative   ISTAT Gases Lactate Venous (Collected: 0448): pH: 7.44 (H), PCO2: 46, PO2: 22 (L), Bicarbonate: 31 (H), O2 Sat: 40, Lactic Acid: 1.3  CBC: WBC 13.7 (H), HGB 12.8 (L),   Lactic Acid (Collected at 0446): 1.4  Troponin (Collected 0446): 0.037  CMP: Glucose: 217 (H), BUN: 35 (H), GFR Estimate: 59 (L), Albumin: 2.9 (L), Creatinine 1.17  Glucose by meter (Collected 0443): 212 (H)  Influenza A/B Antigen (Specimen: Nasopharyngeal): Positive for Influenza A    Interventions:  0533 NS 1000 ml IV    Emergency Department Course:    0433 Nursing notes and vitals reviewed.    0446 IV was inserted and blood was drawn for laboratory testing, results above.    0450 I performed an exam of the patient as documented above.     0507 The patient was sent for a XR Chest 1 View while in the emergency department, results above.     0532 I discussed the patient with the hospitalist, Dr. Joy, who will admit the patient to a monitored bed for further evaluation and treatment.    0536 I personally reviewed the imaging, ECG, and laboratory results with the patient and answered all related questions prior to admission. I discussed the treatment plan with the patient. They expressed understanding of this plan and consented to admission.     0538 I updated Dr. Joy that the patient is DNR/DNI.     0600 I consulted with Dr. Joy regarding the plan of action and he recommended neurology consult prior to admission.    0617 I consulted with Dr. Pdailla from Neurology regarding the patient's presentation.     Impression & Plan      Medical Decision Making:  Mariano Clemente is a 86 year old male with a history of CVA and myasthenia gravis who presents to  the emergency department today for evaluation of weakness and fever. Vitals revealed temperature of 101 and tachycardia with hypoxia of 86% on room air. The patient is a poor historian. It is unclear what his baseline is. He does have a stroke with some deficits, but it is unclear how much he typically answers in terms of questions. He is not answering many questions today. He is moving all extremities and has no focal symptoms. He appeared dehydrated on exam as well. His lungs sounded clear although he gave a poor effort with his respiratory status, although he was in no respiratory distress beyond the hypoxia. Oxygen was placed on him and his saturations improved. Blood cultures were obtained and IV fluids were given. Lactate was normal. White count was mildly elevated at 13. Chest Xray revealed no pneumonia. Influenza was positive so I suspect this is the  of most of his symptoms. A complicating factor is that he has myasthenia gravis. He is on prednisone daily. He was started on BiPAP to see if this had improved his respiratory effort. This somewhat improved. His respiratory seems stable and had decent tidal volumes. He is DNR/DNI based on his POLST. Dr. Joy was able to contact his wife and said his baseline deficits was his weakness in his lower extremities. The patient will be admitted to the ICU for BiPAP, continued respiratory monitoring, and continued treatment of influenza with possible complicating pneumonia versus aspiration pneumonia. He remained hemodynamically stable. We will have a NIF done, either down here or in the ICU to see what his respiratory effort is truly with his myasthenia gravis as a crisis can be devastating in terms of his respiratory status. The patient is transferred to the ICU in stable condition.     Diagnosis:    ICD-10-CM    1. Influenza A J10.1      Disposition:   The patient is admitted into the care of Dr. Joy.    Scribe Disclosure:  Genoveva GONZALEZ, am serving  as a scribe at 4:49 AM on 12/29/2017 to document services personally performed by Sneha Goldstein MD based on my observations and the provider's statements to me.    [facility] EMERGENCY DEPARTMENT       Sneha Goldstein MD  12/29/17 0844

## 2017-12-29 NOTE — PROGRESS NOTES
O: pt will have safe admission to ICU from ER for + influenza  D/A: pt lethargic like upon arrival to room with bipap on. He did open eyes and move both hands and seemed to nod appropriately before falling back to sleep. VSS. Pt in a flutter on tele and md notified. Pt is rate controlled. Condom cath placed for monitoring UOP.   R: pt laying in bed sleeping, temp 100.6 ax. Will cont to monitor.

## 2017-12-29 NOTE — PROGRESS NOTES
SWS:  SW notified that pt was admitted from [facility]. SW contacted facility to confirm bed hold. SW left message with Nanda the LTC SW at [facility].  SW waiting for return call.   RAY also notified that pt has a [employer] [facility] ([phone]).

## 2017-12-29 NOTE — IP AVS SNAPSHOT
` `           [place] [address] MEDICAL SURGICAL: [phone]                 INTERAGENCY TRANSFER FORM - NOTES (H&P, Discharge Summary, Consults, Procedures, Therapies)   2017                    Hospital Admission Date: 2017  KARLI CLEMENTE   : 10/6/1931  Sex: Male        Patient PCP Information     Provider PCP Type    [facility]         History & Physicals      H&P by Manny Joy MD at 2017  7:26 AM     Author:  Manny Joy MD Service:  Hospitalist Author Type:  Physician    Filed:  2017  7:26 AM Date of Service:  2017  7:26 AM Creation Time:  2017  7:17 AM    Status:  Signed :  Manny Joy MD (Physician)         Dictation pending.     Karli Clemente is a 86 year old man who came to attention from his NH for fever and cough. PMH is significant for Myasthemia gravis, and he notably was falling a lot, for which reason he was no longer able to stay at home.     I spoke with his wife by phone and although he is weak, she states that he is only mildly compromised from a memory standpoint. He had lower extremity involvement of his MG, as far as she knows.      Pt was found to have no evidence of pneumonia, but is Influenza A positive. He is very difficult to read, is relatively laconic and is probably just sleepy at the time that I met him. Initial attempt at BIPAP was not very successful in the ED. Wife clarifies that she would wnt to intubate him if needed for the short-term, if needed to get him through a period of MG crisis.     I spoke with Pulmonary medicine (Tino) and Neuro (Randy) by phone. Will plan to increase steroids only a little (high doses of steroids could be attempted if he is intubated, but he may decompensate if given too much steroid) and continue (consider advancing the dose of pyridostigmine to tid). Also reasonable to avoid antibiotics, as virtully all of the abx will also interfere with neuromuscular function.     ABG  looks good, though VBG was at least mildly abnormal.     Dx:  Influenza A pneumonia.   Myasthenia grvis. Difficult to tell if pt is in crisis.     PLAN:  1. Admit to ICU.  2. BiPAP for support.   3. Solumedrol 10 mg daily.  4. Avoid most oral meds other than Pyridostigmine.   5. Tamiflu.  6. Consider intubation, as this could potentially be a short term commitment for this pt.   7. Neuro consult.    KP[KP1.1]     Revision History        User Key Date/Time User Provider Type Action    > KP1.1 12/29/2017  7:26 AM Manny Joy MD Physician Sign                  Discharge Summaries     No notes of this type exist for this encounter.         Consult Notes      Consults by Tono Padilla MD at 12/29/2017 12:00 PM     Author:  Tono Padilla MD Service:  Neurology Author Type:  Physician    Filed:  12/29/2017  4:22 PM Date of Service:  12/29/2017 12:00 PM Creation Time:  12/29/2017  3:56 PM    Status:  Signed :  Tono Padilla MD (Physician)     Consult Orders:    1. Neurology IP Consult: Critical Care (patient in or going to ICU); Patient to be seen: Routine - within 24 hours; myasthenia gravis; Consultant may enter orders: Yes [[id number]] ordered by Manny Joy MD at 12/29/17 0627                [facility]    Neurology Consultation     Date of Admission:  12/29/2017  Date of Consult (When I saw the patient): 12/29/17    Assessment & Plan   Mariano Clemente is a 86 year old male who was admitted on 12/29/2017. I was asked to see the patient for shortness of breath, slurred speech, altered mental status in the setting of previously diagnosed myasthenia gravis.[EW1.1]  He has been seen in our clinic previously, where his myasthenia has manifested primarily as generalized weakness, without significant bulbar symptoms.      While this does not preclude an bulbar manifestation as part of a myasthenic crisis, he is clinically doing relatively well, despite our inability to get  accurate PFTs.  He is able to count to 30 without significant shortness of breath.  He denies any difficulties with secretion management.  He has a solid cough.  My inclination for now would be supportive treatment with NIV as needed, and continuing his current medications (Mestinon and Prednisone 10mg)  If his symptoms worsen, temporary intubation and PLEX vs IVIg could be considered as an additional measure.[EW1.2]    I discussed the above diagnosis, assessment, and further testing with the patient.  Total time:[EW1.1] 50[EW1.2]  Minutes, with more than 50% of the time counseling the patient or coordination of care.   Neurocritical care time:  Patient is at high risk for acute neurological deterioration.    Tono Padilla MD    Code Status    Full Code        Reason for Consult   Reason for consult: I was asked by[EW1.1] Dr. Joy[EW1.2] to evaluate this patient for[EW1.1] myasthenia gravis and difficulty with breathing[EW1.2].      Chief Complaint[EW1.1]   Shortness of breath, weakness[EW1.2]    History is obtained from the patient and the electronic medical chart.    History of Present Illness   Mariano Clemente is a 86 year old male who presents with[EW1.1] cough and fever.  He was up all night the previous evening with symptoms that turned out to be influenza A including cough and fever.  He has a history of myasthenia, primarily manifesting as more of a global weakness in the arms and legs, and without a very prominent bulbar component, at least historically.  There was some understandable concern that some of his current symptoms may be secondary to his underlying MG, especially as he reported difficulty with shortness of breath and secretions in the ED.  He was placed on BiPAP for NIV in the ED      When I talked to him today, his ICU nurse noted that he has significantly improved with respect to level of alertness, and he is able to answer brief straight forward questions and count to 30 out loud  without obvious weakness.  While his speech is soft it is not necessarily nasal.  He denied any difficulty with shortness of breath or trouble swallowing.  He was able to wean off the BiPAP without significant difficulty.[EW1.2]      Past Medical History   I have reviewed this patient's medical history and updated it with pertinent information if needed.   Past Medical History:   Diagnosis Date     Diabetes (H)      Myasthenia gravis (H)        Past Surgical History[EW1.1]   I have reviewed this patient's surgical history and updated it with pertinent information if needed.[EW1.2]  History reviewed. No pertinent surgical history.[EW1.3]    Prior to Admission Medications   Prior to Admission Medications   Prescriptions Last Dose Informant Patient Reported? Taking?   Acetaminophen (TYLENOL PO) 12/29/2017 at 0300  Yes Yes   Sig: Take 1,000 mg by mouth every 8 hours   Acetaminophen (TYLENOL PO) Past Month at Unknown time  Yes Yes   Sig: Take 1,000 mg by mouth daily as needed for mild pain or fever   Clopidogrel Bisulfate (PLAVIX PO) 12/28/2017 at 0900 Daughter Yes Yes   Sig: Take 75 mg by mouth daily    GABAPENTIN PO 12/28/2017 at 0900 Daughter Yes Yes   Sig: Take 600 mg by mouth daily    GABAPENTIN PO 12/28/2017 at 2000 Daughter Yes Yes   Sig: Take 300 mg by mouth At Bedtime   PREDNISONE PO 12/28/2017 at 0900 Daughter Yes Yes   Sig: Take 5 mg by mouth daily    albuterol (2.5 MG/3ML) 0.083% neb solution 12/29/2017 at 0300  Yes Yes   Sig: Take 2.5 mg by nebulization every 4 hours as needed for shortness of breath / dyspnea or wheezing   albuterol (PROAIR HFA/PROVENTIL HFA/VENTOLIN HFA) 108 (90 BASE) MCG/ACT Inhaler Past Month at Unknown time  Yes Yes   Sig: Inhale 2 puffs into the lungs every 4 hours as needed for shortness of breath / dyspnea or wheezing   pyridostigmine (MESTINON) 180 MG CR tablet 12/28/2017 at 2000 Daughter Yes Yes   Sig: Take 180 mg by mouth 2 times daily       Facility-Administered Medications: None      Allergies   Allergies   Allergen Reactions     Asa [Aspirin]      Penicillins      Outing        Social History[EW1.1]   I have reviewed this patient's social history and updated it with pertinent information if needed. Mariano Clemente[EW1.2]  reports that he has quit smoking. He does not have any smokeless tobacco history on file. He reports that he does not drink alcohol.[EW1.3]    Family History[EW1.1]   I have reviewed this patient's family history and updated it with pertinent information if needed.[EW1.2]   No family history on file.[EW1.3]    Review of Systems   The 10 point Review of Systems is negative other than noted in the HPI or here.     Physical Exam   Temp: 100.3  F (37.9  C) Temp src: Axillary BP: 107/49 Pulse: 113 Heart Rate: 68 Resp: 20 SpO2: 96 % O2 Device: BiPAP/CPAP Oxygen Delivery: 4 LPM  Vital Signs with Ranges  Temp:  [100.3  F (37.9  C)-101.6  F (38.7  C)] 100.3  F (37.9  C)  Pulse:  [113] 113  Heart Rate:  [] 68  Resp:  [20-27] 20  BP: (101-154)/(41-77) 107/49  FiO2 (%):  [35 %-40 %] 35 %  SpO2:  [86 %-100 %] 96 %  184 lbs 4.87 oz    General Appearance:  No acute distress  Neuro:       Mental Status Exam:[EW1.1]    Awake, sleepy but arouses easily, speech fluent and appropriate[EW1.2]       Cranial Nerves:[EW1.1]   CN II-XII intact.  No obvious ptosis.  Palate rise is symmetric.  Cough is intact[EW1.2]           Motor:[EW1.1]   Good strength in proximal and distal upper and lower extremities.  No obvious neck flexor or extensor weakness[EW1.2]           Reflexes:[EW1.1]  Intact, symmetric[EW1.2]       Sensory:[EW1.1]  Grossly intact[EW1.2]                   Coordination:[EW1.1]   Grossly intact[EW1.2]       Gait:[EW1.1]  Deferred due to medical status[EW1.2]     CV: RRR        Data   Results for orders placed or performed during the hospital encounter of 12/29/17 (from the past 24 hour(s))   Influenza A/B antigen   Result Value Ref Range    Influenza A/B Agn Specimen  Nasopharyngeal     Influenza A Positive (A) NEG^Negative    Influenza B Negative NEG^Negative   EKG 12 lead   Result Value Ref Range    Interpretation ECG Click View Image link to view waveform and result    Glucose by meter   Result Value Ref Range    Glucose 212 (H) 70 - 99 mg/dL   CBC with platelets differential   Result Value Ref Range    WBC 13.7 (H) 4.0 - 11.0 10e9/L    RBC Count 4.08 (L) 4.4 - 5.9 10e12/L    Hemoglobin 12.8 (L) 13.3 - 17.7 g/dL    Hematocrit 39.0 (L) 40.0 - 53.0 %    MCV 96 78 - 100 fl    MCH 31.4 26.5 - 33.0 pg    MCHC 32.8 31.5 - 36.5 g/dL    RDW 12.4 10.0 - 15.0 %    Platelet Count 382 150 - 450 10e9/L    Diff Method Automated Method     % Neutrophils 80.9 %    % Lymphocytes 5.9 %    % Monocytes 10.7 %    % Eosinophils 0.9 %    % Basophils 0.7 %    % Immature Granulocytes 0.9 %    Nucleated RBCs 0 0 /100    Absolute Neutrophil 11.1 (H) 1.6 - 8.3 10e9/L    Absolute Lymphocytes 0.8 0.8 - 5.3 10e9/L    Absolute Monocytes 1.5 (H) 0.0 - 1.3 10e9/L    Absolute Eosinophils 0.1 0.0 - 0.7 10e9/L    Absolute Basophils 0.1 0.0 - 0.2 10e9/L    Abs Immature Granulocytes 0.1 0 - 0.4 10e9/L    Absolute Nucleated RBC 0.0    Lactic acid whole blood   Result Value Ref Range    Lactic Acid 1.4 0.7 - 2.0 mmol/L   Troponin I   Result Value Ref Range    Troponin I ES 0.037 0.000 - 0.045 ug/L   Comprehensive metabolic panel   Result Value Ref Range    Sodium 137 133 - 144 mmol/L    Potassium 4.5 3.4 - 5.3 mmol/L    Chloride 104 94 - 109 mmol/L    Carbon Dioxide 29 20 - 32 mmol/L    Anion Gap 4 3 - 14 mmol/L    Glucose 217 (H) 70 - 99 mg/dL    Urea Nitrogen 35 (H) 7 - 30 mg/dL    Creatinine 1.17 0.66 - 1.25 mg/dL    GFR Estimate 59 (L) >60 mL/min/1.7m2    GFR Estimate If Black 71 >60 mL/min/1.7m2    Calcium 8.5 8.5 - 10.1 mg/dL    Bilirubin Total 0.4 0.2 - 1.3 mg/dL    Albumin 2.9 (L) 3.4 - 5.0 g/dL    Protein Total 6.9 6.8 - 8.8 g/dL    Alkaline Phosphatase 78 40 - 150 U/L    ALT 14 0 - 70 U/L    AST 14 0 - 45  U/L   Magnesium   Result Value Ref Range    Magnesium 2.0 1.6 - 2.3 mg/dL   Phosphorus   Result Value Ref Range    Phosphorus 3.1 2.5 - 4.5 mg/dL   ISTAT gases lactate hector POCT   Result Value Ref Range    Ph Venous 7.44 (H) 7.32 - 7.43 pH    PCO2 Venous 46 40 - 50 mm Hg    PO2 Venous 22 (L) 25 - 47 mm Hg    Bicarbonate Venous 31 (H) 21 - 28 mmol/L    O2 Sat Venous 40 %    Lactic Acid 1.3 0.7 - 2.1 mmol/L   XR Chest 1 View    Narrative    XR CHEST 1 VW  12/29/2017 5:08 AM      HISTORY: Cough, fever.      COMPARISON: 8/5/2016.    FINDINGS: The heart is at the upper limits of normal in size without  pulmonary edema. Minimal atelectasis or scar at the left lung base.  The lungs are otherwise clear. No pneumothorax.      Impression    IMPRESSION: No acute abnormality.    TOMY HERNANDEZ MD   Blood culture ONE site   Result Value Ref Range    Specimen Description Blood Right Arm     Special Requests Aerobic and anaerobic bottles received     Culture Micro No growth after 7 hours    Blood culture   Result Value Ref Range    Specimen Description Blood Left Hand     Special Requests Aerobic and anaerobic bottles received     Culture Micro No growth after 7 hours    INR   Result Value Ref Range    INR 1.06 0.86 - 1.14   Blood gas arterial and oxyhgb   Result Value Ref Range    pH Arterial 7.41 7.35 - 7.45 pH    pCO2 Arterial 42 35 - 45 mm Hg    pO2 Arterial 81 80 - 105 mm Hg    Bicarbonate Arterial 27 21 - 28 mmol/L    FIO2 50%     Oxyhemoglobin Arterial 95 92 - 100 %    Base Excess Art 1.7 mmol/L   Methicillin Resistant Staph Aureus PCR   Result Value Ref Range    Specimen Description Nares     S Aur Meth Resis PCR Negative NEG^Negative   Glucose by meter   Result Value Ref Range    Glucose 206 (H) 70 - 99 mg/dL   UA with Microscopic   Result Value Ref Range    Color Urine Yellow     Appearance Urine Slightly Cloudy     Glucose Urine Negative NEG^Negative mg/dL    Bilirubin Urine Negative NEG^Negative    Ketones Urine  Negative NEG^Negative mg/dL    Specific Gravity Urine 1.026 1.003 - 1.035    Blood Urine Moderate (A) NEG^Negative    pH Urine 5.0 5.0 - 7.0 pH    Protein Albumin Urine Negative NEG^Negative mg/dL    Urobilinogen mg/dL 0.0 0.0 - 2.0 mg/dL    Nitrite Urine Positive (A) NEG^Negative    Leukocyte Esterase Urine Large (A) NEG^Negative    Source Midstream Urine     WBC Urine 149 (H) 0 - 2 /HPF    RBC Urine 16 (H) 0 - 2 /HPF    Bacteria Urine Many (A) NEG^Negative /HPF    Mucous Urine Present (A) NEG^Negative /LPF   Urine Culture Aerobic Bacterial   Result Value Ref Range    Specimen Description Midstream Urine     Special Requests Specimen received in preservative     Culture Micro PENDING    Glucose by meter   Result Value Ref Range    Glucose 227 (H) 70 - 99 mg/dL           Imaging and procedures:  I personally reviewed these images.[EW1.1]           Revision History        User Key Date/Time User Provider Type Action    > EW1.3 12/29/2017  4:22 PM Tono Padilla MD Physician Sign     EW1.2 12/29/2017  4:10 PM Tono Padilla MD Physician      EW1.1 12/29/2017  3:56 PM Tono Padilla MD Physician                      Progress Notes - Physician (Notes from 12/31/17 through 01/03/18)      Progress Notes by Sarah Beth Christensen LSW at 1/3/2018 11:29 AM     Author:  Sarah Beth Christensen LSW Service:  (none) Author Type:      Filed:  1/3/2018 11:30 AM Date of Service:  1/3/2018 11:29 AM Creation Time:  1/3/2018 11:29 AM    Status:  Signed :  Sarah Beth Christensen LSW ()         SWS:  SW notified that pt is able to return to [facility] today. SW spoke with wife who is requesting wc transport.  SW contacted [facility] and transport will be here at 2:15pm. Facility and RN updated.  Orders faxed.[LH1.1]     Revision History        User Key Date/Time User Provider Type Action    > LH1.1 1/3/2018 11:30 AM Sarah Beth Christensen LSW  Sign            Progress Notes by Berny Vázquez  MD Zuhair at 1/3/2018 11:00 AM     Author:  Berny Vázquez MD Service:  Hospitalist Author Type:  Physician    Filed:  1/3/2018 11:01 AM Date of Service:  1/3/2018 11:00 AM Creation Time:  1/3/2018 11:00 AM    Status:  Signed :  Berny Vázquez MD (Physician)         Feels well.  No complaints.  Back to [place] today      Last 24 hours Vitals signs,imaging,microbiology;laboratory results were reviewed by me in EPIC  GENERAL:  Comfortable.  PSYCH: pleasant, oriented, No acute distress.  HEART:  Normal S1, S2 with no edema.  LUNGS:  Clear to auscultation, normal Respiratory effort.  ABDOMEN:  Soft, no hepatosplenomegaly, normal bowel sounds.  SKIN:  Dry to touch, No rash.     Last Basic Metabolic Panel:  Lab Results   Component Value Date     01/01/2018      Lab Results   Component Value Date    POTASSIUM 4.1 01/01/2018     Lab Results   Component Value Date    CHLORIDE 108 01/01/2018     Lab Results   Component Value Date    DALTON 8.4 01/01/2018     Lab Results   Component Value Date    CO2 28 01/01/2018     Lab Results   Component Value Date    BUN 19 01/01/2018     Lab Results   Component Value Date    CR 0.95 01/01/2018     Lab Results   Component Value Date    GLC 93 01/01/2018          A/p  Influenza - completed therapy  DM - stop Lantus; start metformin on discharge[SL1.1]     Revision History        User Key Date/Time User Provider Type Action    > SL1.1 1/3/2018 11:01 AM Berny Vázquez MD Physician Sign            Progress Notes by Francia De La Cruz PT at 1/2/2018  2:26 PM     Author:  Francia De La Cruz PT Service:  (none) Author Type:  Physical Therapist    Filed:  1/2/2018  2:26 PM Date of Service:  1/2/2018  2:26 PM Creation Time:  1/2/2018  2:26 PM    Status:  Signed :  Francia De La Cruz PT (Physical Therapist)            01/02/18 1300   Quick Adds   Type of Visit Initial PT Evaluation   Living Environment   Lives With facility resident   Living Arrangements assisted  living  ([place])   Living Environment Comment Spoke with facility [facility]. They report pt uses a wheelchair for mobility, but is able to transfer into it on his own with use of a FWW by himself. Per Александр, pt is able to manage on his own in the bathroom and is typically continent.    Self-Care   Usual Activity Tolerance fair   Current Activity Tolerance fair   Equipment Currently Used at Home (Facility reports wheelchair and FWW)   Functional Level Prior   Ambulation 1-->assistive equipment   Transferring 1-->assistive equipment   Fall history within last six months yes   General Information   Onset of Illness/Injury or Date of Surgery - Date 01/02/18   Referring Physician MD Grabiel   Patient/Family Goals Statement Return home   Pertinent History of Current Problem (include personal factors and/or comorbidities that impact the POC) 86 year old male admitted with weakness, tested positive for influenza A. Per H&P, patient is mildly compromised cognitively at baseline.    Precautions/Limitations fall precautions   Cognitive Status Examination   Orientation person;place   Level of Consciousness alert;confused   Follows Commands and Answers Questions able to follow single-step instructions   Personal Safety and Judgment impaired   Pain Assessment   Patient Currently in Pain No   Range of Motion (ROM)   ROM Comment UE and LE AROM WFLs   Strength   Strength Comments Decreased generally. Needs multiple attempts to stand from chair   Bed Mobility   Bed Mobility Comments Sit to supine with min A   Transfer Skills   Transfer Comments Sit to/from stand with CGA/min A   Gait   Gait Comments Ambulates 5' with FWW and CGA   Balance   Balance Comments Requires bilateral UE support on FWW   General Therapy Interventions   Planned Therapy Interventions bed mobility training;gait training;strengthening;transfer training;progressive activity/exercise   Clinical Impression   Criteria for Skilled Therapeutic Intervention yes,  "treatment indicated   PT Diagnosis Generalized weakness   Influenced by the following impairments LE weakness, impaired balance, impaired cognition   Functional limitations due to impairments Decreased IND with mobility   Clinical Presentation Stable/Uncomplicated   Clinical Presentation Rationale Medically stable   Clinical Decision Making (Complexity) Low complexity   Therapy Frequency` daily   Predicted Duration of Therapy Intervention (days/wks) One week   Anticipated Discharge Disposition Long Term Care Facility  (Possible HHPT)   Risk & Benefits of therapy have been explained Yes   Patient, Family & other staff in agreement with plan of care Yes   [facility] AM-PAC TM \"6 Clicks\"   2016, Trustees of [facility], under license to [employer].  All rights reserved.   6 Clicks Short Forms Basic Mobility Inpatient Short Form   [facility] AM-PAC  \"6 Clicks\" V.2 Basic Mobility Inpatient Short Form   1. Turning from your back to your side while in a flat bed without using bedrails? 4 - None   2. Moving from lying on your back to sitting on the side of a flat bed without using bedrails? 3 - A Little   3. Moving to and from a bed to a chair (including a wheelchair)? 3 - A Little   4. Standing up from a chair using your arms (e.g., wheelchair, or bedside chair)? 3 - A Little   5. To walk in hospital room? 3 - A Little   6. Climbing 3-5 steps with a railing? 2 - A Lot   Basic Mobility Raw Score (Score out of 24.Lower scores equate to lower levels of function) 18   Total Evaluation Time   Total Evaluation Time (Minutes) 5[BB1.1]        Revision History        User Key Date/Time User Provider Type Action    > BB1.1 1/2/2018  2:26 PM Francia De La Cruz, PT Physical Therapist Sign            Progress Notes by Berny Vázquez MD at 1/2/2018  2:20 PM     Author:  Berny Vázquez MD Service:  Hospitalist Author Type:  Physician    Filed:  1/2/2018  2:25 PM Date of Service:  1/2/2018  2:20 PM Creation " Time:  1/2/2018  2:20 PM    Status:  Signed :  Berny Vázquez MD (Physician)             [facility]  Hospitalist Progress Note  Berny Vázquez MD 01/02/2018    Reason for Stay (Diagnosis): influenza A         Assessment and Plan:      Summary of Stay: Mariano Clemente is a 86-year-old gentleman with a history of myasthenia gravis, who came to attention tonight with apparent cough and fever,  positive for influenza A.  he has also been treated for a UTI.            DIAGNOSES:   1.  Influenza A with acuted respiratory failure:  On RA and now doing much better (initiially required BIPAP).  A little confused at times, likely near baseline.     Nebs    Already on prednisone  daily for MG    Tamiflu 75 mg p.o. B.i.d. (will complete last dose this evening)     2.  Myasthenia gravis, with history of significant weakness.  does not appear to be in crisis.    Transition prednisone to 10mg daily per neuro recs - appears to be on 5 mg daily chronically    Appreciate neurology input    May d/c NIF checks as pt unable to participate effectively    Continue with pyridostigmine twice daily.        3.  DM 2    Medium ISS    Cont lantus 8 units q am     4.  Urinary tract infection, E. Coli - stop cipro, completed therapy        5.  Acute encephalopathy:  Suspect related to influenza and urinary tract infection; appears to be improving    Monitor, may have some mild underlying chronic confusion        DVT Prophylaxis: Enoxaparin (Lovenox) SQ  Code Status: Full Code     Disposition:  back to [facility]        Interval History (Subjective):      No complaints                  Physical Exam:      Last Vital Signs:  /74  Pulse 64  Temp 97.8  F (36.6  C) (Oral)  Resp 16  Ht 1.829 m (6')  Wt 82.1 kg (181 lb 1.6 oz)  SpO2 95%  BMI 24.56 kg/m2[SL1.1]      Intake/Output Summary (Last 24 hours) at 01/02/18 1425  Last data filed at 01/02/18 1400   Gross per 24 hour   Intake              543 ml   Output                 0 ml   Net              543 ml[SL1.2]       Constitutional: Awake, alert, cooperative, no apparent distress   Respiratory: Clear to auscultation bilaterally, no crackles or wheezing   Cardiovascular: Regular rate and rhythm, normal S1 and S2, and no murmur noted   Abdomen: Normal bowel sounds, soft, non-distended, non-tender   Skin: No rashes, no cyanosis, dry to touch   Neuro: Alert and oriented x3, no weakness, numbness, memory loss   Extremities: No edema, normal range of motion   Other(s):        All other systems: Negative          Medications:      All current medications were reviewed with changes reflected in problem list.         Data:      All new lab and imaging data was reviewed.   Labs:[SL1.1]    Recent Labs  Lab 01/01/18  0724   WBC 9.1   HGB 11.8*   HCT 36.5*   MCV 96   [SL1.2]      Imaging:[SL1.1]   No results found for this or any previous visit (from the past 24 hour(s)).[SL1.2]      Revision History        User Key Date/Time User Provider Type Action    > SL1.2 1/2/2018  2:25 PM Berny Vázquez MD Physician Sign     SL1.1 1/2/2018  2:20 PM Berny Vázquez MD Physician             Progress Notes by Mariano Sanz OT at 1/2/2018  9:33 AM     Author:  Mariano Sanz OT Service:  (none) Author Type:  Occupational Therapist    Filed:  1/2/2018  9:33 AM Date of Service:  1/2/2018  9:33 AM Creation Time:  1/2/2018  9:33 AM    Status:  Signed :  Mariano Sanz OT (Occupational Therapist)          01/02/18 0919   Quick Adds   Type of Visit Initial Occupational Therapy Evaluation   Living Environment   Lives With facility resident   Living Arrangements residential facility   Home Accessibility no concerns   Living Environment Comment pt states he lives at home with his wife, chart indicates pt is a nursing home resident   Self-Care   Dominant Hand right   Usual Activity Tolerance moderate   Current Activity Tolerance poor   Regular Exercise no   Equipment Currently Used  at Home (pt not able to state. Walker in room, pt used during session)   Functional Level Prior   Ambulation 1-->assistive equipment   Transferring 1-->assistive equipment   Toileting 1-->assistive equipment   Bathing 2-->assistive person   Dressing 2-->assistive person   Eating 0-->independent   Communication 0-->understands/communicates without difficulty   Swallowing 0-->swallows foods/liquids without difficulty   Cognition 0 - no cognition issues reported   Fall history within last six months yes   Which of the above functional risks had a recent onset or change? ambulation;transferring;bathing;dressing   General Information   Onset of Illness/Injury or Date of Surgery - Date 01/01/18   Referring Physician Doua Her   Patient/Family Goals Statement not stated   Additional Occupational Profile Info/Pertinent History of Current Problem Pt admitted with weakness, cough and fever.  Tested positive for influenza A.  PMH includes myasthenia gravis and baseline weakness, DM, Possible UTI, acute encephalopathy   Precautions/Limitations fall precautions   General Observations pt sitting up in chair eating breakfast   Cognitive Status Examination   Orientation person   Level of Consciousness alert   Able to Follow Commands success, 1-step commands   Personal Safety (Cognitive) moderate impairment   Memory impaired   Attention Sustained attention impaired   Cognitive Comment answering mostly with one or two words   Visual Perception   Visual Perception No deficits were identified   Pain Assessment   Patient Currently in Pain No   Range of Motion (ROM)   ROM Quick Adds No deficits were identified   ROM Comment B UEs   Strength   Manual Muscle Testing Quick Adds Other   Strength Comments Significant weakness in both arms, may be partially due to confusion   Hand Strength   Hand Strength Comments gross grasp fair and equal   Transfer Skill: Sit to Stand   Level of [place]: Sit/Stand moderate assist (50% patients effort)  "  Physical Assist/Nonphysical Assist: Sit/Stand verbal cues;1 person assist   Transfer Skill: Sit to Stand full weight-bearing   Assistive Device for Transfer: Sit/Stand rolling walker   Lower Body Dressing   Level of [place]: Dress Lower Body unable to perform   Eating/Self Feeding   Level of [place]: Eating stand-by assist   Physical Assist/Nonphysical Assist: Eating set-up required   Assistive Device (pt eating with left hand although he said he was right brandee)   Activities of Daily Living Analysis   Impairments Contributing to Impaired Activities of Daily Living balance impaired;cognition impaired;strength decreased   General Therapy Interventions   Planned Therapy Interventions ADL retraining;strengthening;transfer training   Clinical Impression   Criteria for Skilled Therapeutic Interventions Met yes, treatment indicated   OT Diagnosis decreased independence in ADLS   Influenced by the following impairments weakness, confusion, imbalance   Assessment of Occupational Performance 3-5 Performance Deficits   Identified Performance Deficits decreased independence and safety for dressing, grooming, bathing, functional transfers   Clinical Decision Making (Complexity) Low complexity   Therapy Frequency daily   Predicted Duration of Therapy Intervention (days/wks) 4 days   Anticipated Discharge Disposition Long Term Care Facility   Risks and Benefits of Treatment have been explained. Yes   Patient, Family & other staff in agreement with plan of care Yes   [facility] TM \"6 Clicks\"   2016, Trustees of [facility], under license to [employer].  All rights reserved.   6 Clicks Short Forms Daily Activity Inpatient Short Form   [facility]  \"6 Clicks\" Daily Activity Inpatient Short Form   1. Putting on and taking off regular lower body clothing? 1 - Total   2. Bathing (including washing, rinsing, drying)? 1 - Total   3. Toileting, which includes using toilet, bedpan or urinal? 2 " - A Lot   4. Putting on and taking off regular upper body clothing? 2 - A Lot   5. Taking care of personal grooming such as brushing teeth? 2 - A Lot   6. Eating meals? 3 - A Little   Daily Activity Raw Score (Score out of 24.Lower scores equate to lower levels of function) 11   Total Evaluation Time   Total Evaluation Time (Minutes) 15[JF1.1]        Revision History        User Key Date/Time User Provider Type Action    > JF1.1 1/2/2018  9:33 AM Mariano Sanz OT Occupational Therapist Sign            Progress Notes by Giuliano Tavarez MD at 1/1/2018  4:49 PM     Author:  Giuliano Tavarez MD Service:  Neurology Author Type:  Physician    Filed:  1/1/2018  5:00 PM Date of Service:  1/1/2018  4:49 PM Creation Time:  1/1/2018  4:49 PM    Status:  Addendum :  Giuliano Tavarez MD (Physician)         Doing well with prednisone decreased to 10 mg po and transfer to floor.[JC1.1]  No fatigable weakness, speech is at baseline with dysarthria or midline weakness.  Continue prednisone 10 mg/d for 5 days and d/c  Follow up with neurology as outpatient.  Nothing further[JC1.2]    35 minutes on floor[JC1.3]      Revision History        User Key Date/Time User Provider Type Action    > JC1.3 1/1/2018  5:00 PM Giuliano Tavarez MD Physician Addend     JC1.2 1/1/2018  4:59 PM Giuliano Tavarez MD Physician Sign     JC1.1 1/1/2018  4:49 PM Giuliano Tavarez MD Physician             Progress Notes by Meenakshi Sapin MD at 1/1/2018  4:23 PM     Author:  Meenakshi Spain MD Service:  Hospitalist Author Type:  Physician    Filed:  1/1/2018  4:30 PM Date of Service:  1/1/2018  4:23 PM Creation Time:  1/1/2018  4:23 PM    Status:  Signed :  Meenakshi Spain MD (Physician)         [facility]    Hospitalist Progress Note  Meenakshi Spain MD  Date of Service (when I saw the patient): 01/01/2018    Assessment & Plan   Mr. Clemente is a challenging 86-year-old gentleman  with a history of myasthenia gravis, who came to attention tonight with apparent cough and fever,  positive for influenza A.           DIAGNOSES:   1.  Influenza A with acuted respiratory failure:  Off BIPAP > 48 hours and doing much better.  A little confused at times, unclear baseline.     Continue oxygen and wean as tolerated.     Nebs    Already on prednisone 10mg po daily for MG    Tamiflu 75 mg p.o. B.i.d.    2.  Myasthenia gravis, with history of significant weakness.  does not appear to be in crisis.    Transition prednisone to 10mg daily per neuro recs    Appreciate neurology input    May d/c NIF checks as pt unable to participate effectively    Continue with pyridostigmine twice daily.       3.  DM 2    Medium ISS    Cont lantus 8 units q am    4.  Urinary tract infection, E. coli    Cont oral Cipro (avoid PCN and Ceph with allergy)      5.  Acute encephalopathy:  Suspect related to influenza and urinary tract infection; appears to be improving    Monitor, may have some mild underlying confusion      DVT Prophylaxis: Enoxaparin (Lovenox) SQ  Code Status: Full Code    Disposition:  1-2 days depending on progress with PT/OT.      Time spent >35 minutes    Meenakshi Spain MD, MD      Interval History   No c/o. Denies dyspnea. Occasional non-productive cough    -Data reviewed today: I reviewed all new labs and imaging results over the last 24 hours. I personally reviewed no images or EKG's today.    Physical Exam   Temp: 97  F (36.1  C) Temp src: Oral BP: 166/75 Pulse: 64 Heart Rate: 74 Resp: 18 SpO2: 96 % O2 Device: None (Room air)    Vitals:    12/30/17 0600 12/31/17 0400 01/01/18 0443   Weight: 83.6 kg (184 lb 4.9 oz) 82.9 kg (182 lb 12.2 oz) 84.1 kg (185 lb 4.8 oz)     Vital Signs with Ranges  Temp:  [97  F (36.1  C)-98.6  F (37  C)] 97  F (36.1  C)  Pulse:  [64] 64  Heart Rate:  [62-88] 74  Resp:  [18-20] 18  BP: (142-171)/(61-75) 166/75  SpO2:  [92 %-96 %] 96 %  I/O last 3 completed shifts:  In: 3  [I.V.:3]  Out: -     Constitutional: Awake, alert, cooperative, no apparent distress, sitting in chair   Respiratory: Clear to auscultation bilaterally but very diminished breath sounds, no crackles or wheezing   Cardiovascular: Regular rate and rhythm, normal S1 and S2, and no murmur noted       Skin: No rashes, no cyanosis, dry to touch   Neuro: Alert and oriented x2, no weakness, numbness   Extremities: No edema, normal range of motion   Other(s): More alert and talkative.  Walking in hallway with walker this am.         All other systems: Negative       Medications        insulin aspart  1-7 Units Subcutaneous TID AC     insulin aspart  1-5 Units Subcutaneous At Bedtime     [START ON 1/2/2018] predniSONE  10 mg Oral Daily     sodium chloride (PF)  3 mL Intracatheter Q8H     insulin glargine  8 Units Subcutaneous Daily     ciprofloxacin  250 mg Oral BID     oseltamivir  30 mg Oral BID     enoxaparin  40 mg Subcutaneous Q24H     pyridostigmine  180 mg Oral BID       Data     Recent Labs  Lab 01/01/18  0724 12/29/17  0528 12/29/17  0446   WBC 9.1  --  13.7*   HGB 11.8*  --  12.8*   MCV 96  --  96     --  382   INR  --  1.06  --      --  137   POTASSIUM 4.1  --  4.5   CHLORIDE 108  --  104   CO2 28  --  29   BUN 19  --  35*   CR 0.95  --  1.17   ANIONGAP 4  --  4   DALTON 8.4*  --  8.5   GLC 93  --  217*   ALBUMIN  --   --  2.9*   PROTTOTAL  --   --  6.9   BILITOTAL  --   --  0.4   ALKPHOS  --   --  78   ALT  --   --  14   AST  --   --  14   TROPI  --   --  0.037       Imaging:  No results found for this or any previous visit (from the past 24 hour(s)).[DH1.1]       Revision History        User Key Date/Time User Provider Type Action    > DH1.1 1/1/2018  4:30 PM Meenakshi Spain MD Physician Sign            Progress Notes by Nilda Saavedra RT at 12/31/2017  7:01 PM     Author:  Nilda Saavedra RT Service:  (none) Author Type:  Respiratory Therapist    Filed:  12/31/2017  7:04 PM Date of Service:  12/31/2017  7:01  "PM Creation Time:  12/31/2017  7:01 PM    Status:  Signed :  Nilda Saavedra RT (Respiratory Therapist)         [facility] RCAT     Date: 12/31/17    Admission Dx: Influenza A+    Pulmonary History: former smoker    Home Nebulizer/MDI Use: none on file    Home Oxygen: none on file    Acuity Level (RCAT flow sheet): Level 4    Aerosol Therapy initiated: Duoneb Q6prn and Albuterol Q2prn.    Volume Expansion initiated: IS for diminished breath sound.    Current Oxygen Requirements: Room Air    Current SpO2: 95%    Re-evaluation date: 1/3/17    Patient Education: Discuss with patient the indication, benefit and side effect of nebulizer.    See \"RT Assessments\" flow sheet for patient assessment scoring and Acuity Level Details.[RT1.1]                Revision History        User Key Date/Time User Provider Type Action    > RT1.1 12/31/2017  7:04 PM Nilda Saavedra RT Respiratory Therapist Sign            Progress Notes by Giuliano Tavarez MD at 12/31/2017  2:53 PM     Author:  Giuliano Tavarez MD Service:  Neurology Author Type:  Physician    Filed:  12/31/2017  3:00 PM Date of Service:  12/31/2017  2:53 PM Creation Time:  12/31/2017  2:53 PM    Status:  Signed :  Giuliano Tavarez MD (Physician)         Improving   Agree with transfer to floor bed.  Continue mestinon.  Continue oral steroids. Consider taper to 10 mg if patient does well through the late PM and night.[JC1.1]     Revision History        User Key Date/Time User Provider Type Action    > JC1.1 12/31/2017  3:00 PM Giuliano Tavarez MD Physician Sign            Progress Notes by Katerina Jaimes RN at 12/31/2017  1:10 PM     Author:  Katerina Jaimes RN Service:  (none) Author Type:  Registered Nurse    Filed:  12/31/2017  1:12 PM Date of Service:  12/31/2017  1:10 PM Creation Time:  12/31/2017  1:10 PM    Status:  Signed :  Katerina Jaimes RN (Registered Nurse)         O: pt will have safe transfer to " 3rd flr tele for cont of care  D/A: pt alert/forgetful VSS. Up with walker. warner po. Void adequate. Good strong cough with yellow/tan sputum. On RA. Report called to RN.  R: pt transferred safely by wheelchair with LPN.[CJ1.1]      Revision History        User Key Date/Time User Provider Type Action    > CJ1.1 12/31/2017  1:12 PM Katerina Jaimes, RN Registered Nurse Sign            Progress Notes by Lit Potts MD at 12/31/2017 10:13 AM     Author:  Lit Potts MD Service:  Hospitalist Author Type:  Physician    Filed:  12/31/2017 10:27 AM Date of Service:  12/31/2017 10:13 AM Creation Time:  12/31/2017 10:13 AM    Status:  Addendum :  Lit Potts MD (Physician)         [facility]    Hospitalist Progress Note    Date of Service (when I saw the patient): 12/31/2017    Assessment & Plan   Mr. Clemente is a challenging 86-year-old gentleman with a history of myasthenia gravis, who came to attention tonight with apparent cough and fever,  positive for influenza A.           DIAGNOSES:   1.  Influenza A with acuted respiratory failure:  Off BIPAP > 24 hours and doing much better.  A little confused at times, unclear baseline.     Continue oxygen and wean as tolerated.     Nebs    Steroids    Tamiflu 75 mg p.o. B.i.d.    Ok to floor    2.  Myasthenia gravis, with history of significant weakness.  does not appear to be in crisis.    Steroids[PK1.1], change to prednisone 15 mg daily (baseline is 5 mg daily)[PK1.2]    Appreciate neurology consult    NIF bid     Continue with pyridostigmine twice daily.       3.  DM 2    Increase SSI    Increase Lantus to 12    Advance diet     4.  Urinary tract infection, E. coli    Change to oral Cipro (avoid PCN and Ceph with allergy)      5.  Acute encephalopathy:  Suspect related to influenza and urinary tract infection    Monitor, may have some mild underlying confusion    6.  DM2 and Hyperglycemia    On no oral medications or insulin at home    Decrease  insulin as glucoses allow[PK1.1], decrease Lantus to 8     Check A1c[PK1.3]    DVT Prophylaxis: Enoxaparin (Lovenox) SQ  Code Status: Full Code    Disposition:  Ok transfer to floor.  Expected discharge in 1-2 days if continues to do well.      Lit Potts MD  Text Page    Interval History   Denies short of breath, cough, fever or pain.  Knows it is New Years [place] but unclear of date. Knows in hospital but cannot remember which one.     -Data reviewed today: I reviewed all new labs and imaging results over the last 24 hours. I personally reviewed no images or EKG's today.    Physical Exam   Temp: 97.8  F (36.6  C) Temp src: Oral BP: 150/66   Heart Rate: 57   SpO2: 96 % O2 Device: None (Room air) Oxygen Delivery: 4 LPM  Vitals:    12/29/17 0806 12/30/17 0600 12/31/17 0400   Weight: 83.6 kg (184 lb 4.9 oz) 83.6 kg (184 lb 4.9 oz) 82.9 kg (182 lb 12.2 oz)     Vital Signs with Ranges  Temp:  [97.8  F (36.6  C)-98.4  F (36.9  C)] 97.8  F (36.6  C)  Heart Rate:  [54-86] 57  BP: (111-150)/(46-80) 150/66  SpO2:  [92 %-98 %] 96 %  I/O last 3 completed shifts:  In: 1284.67 [P.O.:450; I.V.:834.67]  Out: 100 [Urine:100]    Constitutional: Awake, alert, cooperative, no apparent distress, sitting in chair   Respiratory: Clear to auscultation bilaterally but very diminished breath sounds, no crackles or wheezing   Cardiovascular: Regular rate and rhythm, normal S1 and S2, and no murmur noted   Abdomen: Normal bowel sounds, soft, non-distended, non-tender   Skin: No rashes, no cyanosis, dry to touch   Neuro: Alert and oriented x2, no weakness, numbness   Extremities: No edema, normal range of motion   Other(s): More alert and talkative.  Walking in hallway with walker this am.         All other systems: Negative       Medications     dextrose 5% and 0.45% NaCl + KCl 20 mEq/L Stopped (12/31/17 0753)       sodium chloride (PF)  3 mL Intracatheter Q8H     insulin glargine  12 Units Subcutaneous Daily     methylPREDNISolone  10 mg  Intravenous Daily     oseltamivir  30 mg Oral BID     enoxaparin  40 mg Subcutaneous Q24H     insulin aspart  1-12 Units Subcutaneous Q4H     ciprofloxacin  200 mg Intravenous Q12H     ipratropium - albuterol 0.5 mg/2.5 mg/3 mL  3 mL Nebulization 4x daily     influenza Vac Split High-Dose  0.5 mL Intramuscular Prior to discharge     pyridostigmine  180 mg Oral BID       Data     Recent Labs  Lab 12/29/17  0528 12/29/17  0446   WBC  --  13.7*   HGB  --  12.8*   MCV  --  96   PLT  --  382   INR 1.06  --    NA  --  137   POTASSIUM  --  4.5   CHLORIDE  --  104   CO2  --  29   BUN  --  35*   CR  --  1.17   ANIONGAP  --  4   DALTON  --  8.5   GLC  --  217*   ALBUMIN  --  2.9*   PROTTOTAL  --  6.9   BILITOTAL  --  0.4   ALKPHOS  --  78   ALT  --  14   AST  --  14   TROPI  --  0.037       Imaging:  No results found for this or any previous visit (from the past 24 hour(s)).[PK1.1]       Revision History        User Key Date/Time User Provider Type Action    > PK1.2 12/31/2017 10:27 AM Lit Potts MD Physician Addend     PK1.3 12/31/2017 10:22 AM Lit Potts MD Physician Addend     PK1.1 12/31/2017 10:20 AM Lit Potts MD Physician Sign                  Procedure Notes     No notes of this type exist for this encounter.         Progress Notes - Therapies (Notes from 12/31/17 through 01/03/18)      Progress Notes by Francia De La Cruz PT at 1/2/2018  2:26 PM     Author:  Francia De La Cruz PT Service:  (none) Author Type:  Physical Therapist    Filed:  1/2/2018  2:26 PM Date of Service:  1/2/2018  2:26 PM Creation Time:  1/2/2018  2:26 PM    Status:  Signed :  Francia De La Cruz PT (Physical Therapist)            01/02/18 1300   Quick Adds   Type of Visit Initial PT Evaluation   Living Environment   Lives With facility resident   Living Arrangements assisted living  ([facility])   Living Environment Comment Spoke with [facility]. They report pt uses a wheelchair for mobility, but is able to transfer into it on  his own with use of a FWW by himself. Per Александр, pt is able to manage on his own in the bathroom and is typically continent.    Self-Care   Usual Activity Tolerance fair   Current Activity Tolerance fair   Equipment Currently Used at Home (Facility reports wheelchair and FWW)   Functional Level Prior   Ambulation 1-->assistive equipment   Transferring 1-->assistive equipment   Fall history within last six months yes   General Information   Onset of Illness/Injury or Date of Surgery - Date 01/02/18   Referring Physician Her, MD   Patient/Family Goals Statement Return home   Pertinent History of Current Problem (include personal factors and/or comorbidities that impact the POC) 86 year old male admitted with weakness, tested positive for influenza A. Per H&P, patient is mildly compromised cognitively at baseline.    Precautions/Limitations fall precautions   Cognitive Status Examination   Orientation person;place   Level of Consciousness alert;confused   Follows Commands and Answers Questions able to follow single-step instructions   Personal Safety and Judgment impaired   Pain Assessment   Patient Currently in Pain No   Range of Motion (ROM)   ROM Comment UE and LE AROM WFLs   Strength   Strength Comments Decreased generally. Needs multiple attempts to stand from chair   Bed Mobility   Bed Mobility Comments Sit to supine with min A   Transfer Skills   Transfer Comments Sit to/from stand with CGA/min A   Gait   Gait Comments Ambulates 5' with FWW and CGA   Balance   Balance Comments Requires bilateral UE support on FWW   General Therapy Interventions   Planned Therapy Interventions bed mobility training;gait training;strengthening;transfer training;progressive activity/exercise   Clinical Impression   Criteria for Skilled Therapeutic Intervention yes, treatment indicated   PT Diagnosis Generalized weakness   Influenced by the following impairments LE weakness, impaired balance, impaired cognition   Functional  "limitations due to impairments Decreased IND with mobility   Clinical Presentation Stable/Uncomplicated   Clinical Presentation Rationale Medically stable   Clinical Decision Making (Complexity) Low complexity   Therapy Frequency` daily   Predicted Duration of Therapy Intervention (days/wks) One week   Anticipated Discharge Disposition Long Term Care Facility  (Possible HHPT)   Risk & Benefits of therapy have been explained Yes   Patient, Family & other staff in agreement with plan of care Yes   [facility] TM \"6 Clicks\"   2016, Trustees of [facility], under license to [employer].  All rights reserved.   6 Clicks Short Forms Basic Mobility Inpatient Short Form   [facility] AM-PAC  \"6 Clicks\" V.2 Basic Mobility Inpatient Short Form   1. Turning from your back to your side while in a flat bed without using bedrails? 4 - None   2. Moving from lying on your back to sitting on the side of a flat bed without using bedrails? 3 - A Little   3. Moving to and from a bed to a chair (including a wheelchair)? 3 - A Little   4. Standing up from a chair using your arms (e.g., wheelchair, or bedside chair)? 3 - A Little   5. To walk in hospital room? 3 - A Little   6. Climbing 3-5 steps with a railing? 2 - A Lot   Basic Mobility Raw Score (Score out of 24.Lower scores equate to lower levels of function) 18   Total Evaluation Time   Total Evaluation Time (Minutes) 5[BB1.1]        Revision History        User Key Date/Time User Provider Type Action    > BB1.1 1/2/2018  2:26 PM Francia De La Cruz PT Physical Therapist Sign            Progress Notes by Mariano Sanz OT at 1/2/2018  9:33 AM     Author:  Mariano Sanz OT Service:  (none) Author Type:  Occupational Therapist    Filed:  1/2/2018  9:33 AM Date of Service:  1/2/2018  9:33 AM Creation Time:  1/2/2018  9:33 AM    Status:  Signed :  Mariano Sanz OT (Occupational Therapist)          01/02/18 0919   Quick Adds   Type of Visit Initial " Occupational Therapy Evaluation   Living Environment   Lives With facility resident   Living Arrangements residential facility   Home Accessibility no concerns   Living Environment Comment pt states he lives at home with his wife, chart indicates pt is a nursing home resident   Self-Care   Dominant Hand right   Usual Activity Tolerance moderate   Current Activity Tolerance poor   Regular Exercise no   Equipment Currently Used at Home (pt not able to state. Walker in room, pt used during session)   Functional Level Prior   Ambulation 1-->assistive equipment   Transferring 1-->assistive equipment   Toileting 1-->assistive equipment   Bathing 2-->assistive person   Dressing 2-->assistive person   Eating 0-->independent   Communication 0-->understands/communicates without difficulty   Swallowing 0-->swallows foods/liquids without difficulty   Cognition 0 - no cognition issues reported   Fall history within last six months yes   Which of the above functional risks had a recent onset or change? ambulation;transferring;bathing;dressing   General Information   Onset of Illness/Injury or Date of Surgery - Date 01/01/18   Referring Physician Doua Her   Patient/Family Goals Statement not stated   Additional Occupational Profile Info/Pertinent History of Current Problem Pt admitted with weakness, cough and fever.  Tested positive for influenza A.  PMH includes myasthenia gravis and baseline weakness, DM, Possible UTI, acute encephalopathy   Precautions/Limitations fall precautions   General Observations pt sitting up in chair eating breakfast   Cognitive Status Examination   Orientation person   Level of Consciousness alert   Able to Follow Commands success, 1-step commands   Personal Safety (Cognitive) moderate impairment   Memory impaired   Attention Sustained attention impaired   Cognitive Comment answering mostly with one or two words   Visual Perception   Visual Perception No deficits were identified   Pain Assessment    Patient Currently in Pain No   Range of Motion (ROM)   ROM Quick Adds No deficits were identified   ROM Comment B UEs   Strength   Manual Muscle Testing Quick Adds Other   Strength Comments Significant weakness in both arms, may be partially due to confusion   Hand Strength   Hand Strength Comments gross grasp fair and equal   Transfer Skill: Sit to Stand   Level of [place]: Sit/Stand moderate assist (50% patients effort)   Physical Assist/Nonphysical Assist: Sit/Stand verbal cues;1 person assist   Transfer Skill: Sit to Stand full weight-bearing   Assistive Device for Transfer: Sit/Stand rolling walker   Lower Body Dressing   Level of [place]: Dress Lower Body unable to perform   Eating/Self Feeding   Level of [place]: Eating stand-by assist   Physical Assist/Nonphysical Assist: Eating set-up required   Assistive Device (pt eating with left hand although he said he was right brandee)   Activities of Daily Living Analysis   Impairments Contributing to Impaired Activities of Daily Living balance impaired;cognition impaired;strength decreased   General Therapy Interventions   Planned Therapy Interventions ADL retraining;strengthening;transfer training   Clinical Impression   Criteria for Skilled Therapeutic Interventions Met yes, treatment indicated   OT Diagnosis decreased independence in ADLS   Influenced by the following impairments weakness, confusion, imbalance   Assessment of Occupational Performance 3-5 Performance Deficits   Identified Performance Deficits decreased independence and safety for dressing, grooming, bathing, functional transfers   Clinical Decision Making (Complexity) Low complexity   Therapy Frequency daily   Predicted Duration of Therapy Intervention (days/wks) 4 days   Anticipated Discharge Disposition Long Term Care Facility   Risks and Benefits of Treatment have been explained. Yes   Patient, Family & other staff in agreement with plan of care Yes   [facility] AM-PAC TM  "\"6 Clicks\"   2016, Trustees of [facility], under license to [employer].  All rights reserved.   6 Clicks Short Forms Daily Activity Inpatient Short Form   [facility] AM-PAC  \"6 Clicks\" Daily Activity Inpatient Short Form   1. Putting on and taking off regular lower body clothing? 1 - Total   2. Bathing (including washing, rinsing, drying)? 1 - Total   3. Toileting, which includes using toilet, bedpan or urinal? 2 - A Lot   4. Putting on and taking off regular upper body clothing? 2 - A Lot   5. Taking care of personal grooming such as brushing teeth? 2 - A Lot   6. Eating meals? 3 - A Little   Daily Activity Raw Score (Score out of 24.Lower scores equate to lower levels of function) 11   Total Evaluation Time   Total Evaluation Time (Minutes) 15[JF1.1]        Revision History        User Key Date/Time User Provider Type Action    > JF1.1 1/2/2018  9:33 AM Mariano Sanz OT Occupational Therapist Sign            Progress Notes by Nilda Saavedra RT at 12/31/2017  7:01 PM     Author:  Nilda Saavedra RT Service:  (none) Author Type:  Respiratory Therapist    Filed:  12/31/2017  7:04 PM Date of Service:  12/31/2017  7:01 PM Creation Time:  12/31/2017  7:01 PM    Status:  Signed :  Nilda Saavedra RT (Respiratory Therapist)         Critical access hospital RCAT     Date: 12/31/17    Admission Dx: Influenza A+    Pulmonary History: former smoker    Home Nebulizer/MDI Use: none on file    Home Oxygen: none on file    Acuity Level (RCAT flow sheet): Level 4    Aerosol Therapy initiated: Duoneb Q6prn and Albuterol Q2prn.    Volume Expansion initiated: IS for diminished breath sound.    Current Oxygen Requirements: Room Air    Current SpO2: 95%    Re-evaluation date: 1/3/17    Patient Education: Discuss with patient the indication, benefit and side effect of nebulizer.    See \"RT Assessments\" flow sheet for patient assessment scoring and Acuity Level Details.[RT1.1]                Revision History        User Key Date/Time " User Provider Type Action    > RT1.1 12/31/2017  7:04 PM Nilda Saavedra, RT Respiratory Therapist Sign

## 2017-12-29 NOTE — Clinical Note
Admitting Physician: MARTHA MCCURDY [[id number]]   [facility] [[id number][facility]   Bed Type: Adult Med/Surg [46]   Special needs: Fall Risk [8]   Special needs: Recommend Lift [6]   Bed request comments: LIFT, DROPLET PRECAUTION, INFLUENZA, BED REQUEST @ 2822

## 2017-12-29 NOTE — IP AVS SNAPSHOT
[place] [address] [facility]: [phone]                                              INTERAGENCY TRANSFER FORM - PHYSICIAN ORDERS   2017                    Hospital Admission Date: 2017  KARLI BUTTERFIELD   : 10/6/1931  Sex: Male        Attending Provider: Manny Joy MD     Allergies:  Asa [Aspirin], Penicillins, Strawberry    Infection:  None   Service:  HOSPITALIST    Ht:  1.829 m (6')   Wt:  81.1 kg (178 lb 12.8 oz)   Admission Wt:  81.6 kg (180 lb)    BMI:  24.25 kg/m 2   BSA:  2.03 m 2            Patient PCP Information     Provider PCP Type    [facility]      ED Clinical Impression     Diagnosis Description Comment Added By Time Added    Influenza A [J10.1] Influenza A [J10.1]  Sneha Goldstein MD 2017  7:06 AM      Hospital Problems as of 1/3/2018              Priority Class Noted POA    Acute respiratory failure with hypoxia and hypercarbia (H) Medium  2017 Yes      Non-Hospital Problems as of 1/3/2018              Priority Class Noted    Altered mental status Medium  2016    CVA (cerebral vascular accident) (H) Medium  2016      Code Status History     Date Active Date Inactive Code Status Order ID Comments User Context    8/10/2016 12:26 PM 2017  8:07 AM Full Code [id number]  Giuliano Ramirez MD Outpatient    2016  1:43 AM 8/10/2016 12:26 PM Full Code [id number]  Jluis Thrasher MD ED         Medication Review      START taking        Dose / Directions Comments    metFORMIN 500 MG tablet   Commonly known as:  GLUCOPHAGE   Used for:  Type 2 diabetes mellitus with complication, without long-term current use of insulin (H)        Dose:  500 mg   Take 1 tablet (500 mg) by mouth 2 times daily (with meals)   Quantity:  180 tablet   Refills:  1          CONTINUE these medications which have NOT CHANGED        Dose / Directions Comments    * albuterol (2.5 MG/3ML) 0.083% neb solution        Dose:  2.5 mg   Take 2.5 mg by  nebulization every 4 hours as needed for shortness of breath / dyspnea or wheezing   Refills:  0        * albuterol 108 (90 BASE) MCG/ACT Inhaler   Commonly known as:  PROAIR HFA/PROVENTIL HFA/VENTOLIN HFA        Dose:  2 puff   Inhale 2 puffs into the lungs every 4 hours as needed for shortness of breath / dyspnea or wheezing   Refills:  0        * GABAPENTIN PO        Dose:  600 mg   Take 600 mg by mouth daily   Refills:  0        * GABAPENTIN PO        Dose:  300 mg   Take 300 mg by mouth At Bedtime   Refills:  0        PLAVIX PO        Dose:  75 mg   Take 75 mg by mouth daily   Refills:  0        PREDNISONE PO        Dose:  5 mg   Take 5 mg by mouth daily   Refills:  0        pyridostigmine 180 MG CR tablet   Commonly known as:  MESTINON        Dose:  180 mg   Take 180 mg by mouth 2 times daily   Refills:  0        * TYLENOL PO        Dose:  1000 mg   Take 1,000 mg by mouth every 8 hours   Refills:  0        * TYLENOL PO        Dose:  1000 mg   Take 1,000 mg by mouth daily as needed for mild pain or fever   Refills:  0        * Notice:  This list has 6 medication(s) that are the same as other medications prescribed for you. Read the directions carefully, and ask your doctor or other care provider to review them with you.            After Care     Activity       Your activity upon discharge: activity as tolerated       Diet       Follow this diet upon discharge: diabetic diet             Follow-Up Appointment Instructions     Future Labs/Procedures    Follow-up and recommended labs and tests      Comments:    See primary MD as needed    Follow-up and recommended labs and tests      Comments:    Check blood sugar and record twice a day      Follow-Up Appointment Instructions     Follow-up and recommended labs and tests        See primary MD as needed       Follow-up and recommended labs and tests        Check blood sugar and record twice a day             Statement of Approval     Ordered          01/03/18 1100  I  have reviewed and agree with all the recommendations and orders detailed in this document.  EFFECTIVE NOW     Approved and electronically signed by:  Berny Vázquez MD

## 2017-12-29 NOTE — IP AVS SNAPSHOT
MRN:[id number]                      After Visit Summary   12/29/2017    Mariano Clemente    MRN: [id number]           Thank you!     Thank you for choosing [facility] for your care. Our goal is always to provide you with excellent care. Hearing back from our patients is one way we can continue to improve our services. Please take a few minutes to complete the written survey that you may receive in the mail after you visit. If you would like to speak to someone directly about your visit please contact Patient Relations at [phone]. Thank you!          Patient Information     Date Of Birth          10/6/1931        Designated Caregiver       Most Recent Value    Caregiver    Will someone help with your care after discharge? yes    Name of designated caregiver Yu-wife    Phone number of caregiver on chart    Caregiver address on chart      About your hospital stay     You were admitted on:  December 29, 2017 You last received care in the:  [place] [address] [facility]    You were discharged on:  January 3, 2018       Who to Call     For medical emergencies, please call 911.  For non-urgent questions about your medical care, please call your primary care provider or clinic, [phone]          Attending Provider     Provider Specialty    Sneha Goldstein MD Emergency Medicine    Manny Joy MD Internal Medicine       Primary Care Provider Office Phone # Fax #    [facility] [phone]      After Care Instructions     Activity       Your activity upon discharge: activity as tolerated            Diet       Follow this diet upon discharge: diabetic diet                  Follow-up Appointments     Follow-up and recommended labs and tests        See primary MD as needed            Follow-up and recommended labs and tests        Check blood sugar and record twice a day                  Pending Results     Date and Time Order Name Status Description     "2017 0507 Blood culture ONE site Preliminary     2017 0507 Blood culture Preliminary             Statement of Approval     Ordered          18 1100  I have reviewed and agree with all the recommendations and orders detailed in this document.  EFFECTIVE NOW     Approved and electronically signed by:  Berny Vázquez MD             Admission Information     Date & Time Provider Department Dept. Phone    2017 Manny Joy MD [place] [address] [facility] Surgical [phone]      Your Vitals Were     Blood Pressure Pulse Temperature Respirations Height Weight    137/53 (BP Location: Right arm) 64 97.5  F (36.4  C) (Oral) 20 1.829 m (6') 81.1 kg (178 lb 12.8 oz)    Pulse Oximetry BMI (Body Mass Index)                94% 24.25 kg/m2          MyChart Information     [employer] lets you send messages to your doctor, view your test results, renew your prescriptions, schedule appointments and more. To sign up, go to [email or web address] . Click on \"Log in\" on the left side of the screen, which will take you to the Welcome page. Then click on \"Sign up Now\" on the right side of the page.     You will be asked to enter the access code listed below, as well as some personal information. Please follow the directions to create your username and password.     Your access code is: [email or web address]  Expires: 3/29/2018  7:16 AM     Your access code will  in 90 days. If you need help or a new code, please call your [place] clinic or [phone].        Care EveryWhere ID     This is your Care EveryWhere ID. This could be used by other organizations to access your [place] medical records  [id number]        Equal Access to Services     [facility] AKIL : Hadii fabian Klein, wagarciada lubridgettadaha, qaybta kaalmada taye, leslie [place]. So [facility] [phone].    ATENCIÓN: Si habla español, tiene a elizondo disposición servicios gratuitos de asistencia lingüística. Llame al " [phone].    We comply with applicable federal civil rights laws and [place] laws. We do not discriminate on the basis of race, color, national origin, age, disability, sex, sexual orientation, or gender identity.               Review of your medicines      START taking        Dose / Directions    metFORMIN 500 MG tablet   Commonly known as:  GLUCOPHAGE   Used for:  Type 2 diabetes mellitus with complication, without long-term current use of insulin (H)        Dose:  500 mg   Take 1 tablet (500 mg) by mouth 2 times daily (with meals)   Quantity:  180 tablet   Refills:  1         CONTINUE these medicines which have NOT CHANGED        Dose / Directions    * albuterol (2.5 MG/3ML) 0.083% neb solution        Dose:  2.5 mg   Take 2.5 mg by nebulization every 4 hours as needed for shortness of breath / dyspnea or wheezing   Refills:  0       * albuterol 108 (90 BASE) MCG/ACT Inhaler   Commonly known as:  PROAIR HFA/PROVENTIL HFA/VENTOLIN HFA        Dose:  2 puff   Inhale 2 puffs into the lungs every 4 hours as needed for shortness of breath / dyspnea or wheezing   Refills:  0       * GABAPENTIN PO        Dose:  600 mg   Take 600 mg by mouth daily   Refills:  0       * GABAPENTIN PO        Dose:  300 mg   Take 300 mg by mouth At Bedtime   Refills:  0       PLAVIX PO        Dose:  75 mg   Take 75 mg by mouth daily   Refills:  0       PREDNISONE PO        Dose:  5 mg   Take 5 mg by mouth daily   Refills:  0       pyridostigmine 180 MG CR tablet   Commonly known as:  MESTINON        Dose:  180 mg   Take 180 mg by mouth 2 times daily   Refills:  0       * TYLENOL PO        Dose:  1000 mg   Take 1,000 mg by mouth every 8 hours   Refills:  0       * TYLENOL PO        Dose:  1000 mg   Take 1,000 mg by mouth daily as needed for mild pain or fever   Refills:  0       * Notice:  This list has 6 medication(s) that are the same as other medications prescribed for you. Read the directions carefully, and ask your doctor or other  care provider to review them with you.         Where to get your medicines      These medications were sent to [facility] [place], [place] - [address] [address]  [address] [address] [address]     Phone:  [phone]     metFORMIN 500 MG tablet                Protect others around you: Learn how to safely use, store and throw away your medicines at www.disposemymeds.org.             Medication List: This is a list of all your medications and when to take them. Check marks below indicate your daily home schedule. Keep this list as a reference.      Medications           Morning Afternoon Evening Bedtime As Needed    * albuterol (2.5 MG/3ML) 0.083% neb solution   Take 2.5 mg by nebulization every 4 hours as needed for shortness of breath / dyspnea or wheezing                                * albuterol 108 (90 BASE) MCG/ACT Inhaler   Commonly known as:  PROAIR HFA/PROVENTIL HFA/VENTOLIN HFA   Inhale 2 puffs into the lungs every 4 hours as needed for shortness of breath / dyspnea or wheezing                                * GABAPENTIN PO   Take 600 mg by mouth daily                                * GABAPENTIN PO   Take 300 mg by mouth At Bedtime                                metFORMIN 500 MG tablet   Commonly known as:  GLUCOPHAGE   Take 1 tablet (500 mg) by mouth 2 times daily (with meals)                                PLAVIX PO   Take 75 mg by mouth daily                                PREDNISONE PO   Take 5 mg by mouth daily   Last time this was given:  10 mg on 1/3/2018  9:49 AM                                pyridostigmine 180 MG CR tablet   Commonly known as:  MESTINON   Take 180 mg by mouth 2 times daily   Last time this was given:  180 mg on 1/3/2018  9:49 AM                                * TYLENOL PO   Take 1,000 mg by mouth every 8 hours                                * TYLENOL PO   Take 1,000 mg by mouth daily as needed for mild pain or fever                                 * Notice:  This list has 6 medication(s) that are the same as other medications prescribed for you. Read the directions carefully, and ask your doctor or other care provider to review them with you.

## 2017-12-29 NOTE — PROGRESS NOTES
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   Tolerating bipap ok so no need for immediate intubation.   I don't think he's in a myasthenic crisis   P: mestinon  Iv steroids at slightly higher than home doses.     Cardiovascular:  BP and HR ok and EKG with NSR.    -    Pulmonary:        Bipap 16/8 with  and RR 14 and O2 saturations 99% on 40%. Can't do NIF until more awake but VBG ok.  P: change to 15/6 as CXR clear   -    FiO2 (%): 40 %  Resp: 20    Renal:  No issues   -    ID:        Influenza A on swab but no infiltrate yet.  On tamiflu but avoid antibiotics unless necessary seconday to MG.   -    GI   No issues   -    Nutrition  NPO   -    Endocrine:   Not on home DM medications but has A1C of 8 in 2016.    P lantus 8 and hi intensity SSI   -    Heme/Onc:  No issues   -    DVT Prophylaxis: Enoxaparin (Lovenox) SQ  GI Prophylaxis: Not indicated    Restraints: Restraints for medical healing needed: NO    Family update by me today: No    Prateek Hall    Time Spent on this Encounter   Billing:  I spent 35  minutes bedside and on the inpatient unit today managing the critical care of Mariano Clemente in relation to the issues listed in this note.    Main Plans for Today   Bipap  Steroids  tamiflu       Interval History   Admitted from ED      Physical Exam   Temp: 100.6  F (38.1  C) Temp src: Axillary Temp  Min: 100.6  F (38.1  C)  Max: 101.3  F (38.5  C) BP: 124/71 Pulse: 113 Heart Rate: 73 Resp: 20 SpO2: 97 % O2 Device: BiPAP/CPAP    Vitals:    12/29/17 0433 12/29/17 0806   Weight: 81.6 kg (180 lb) 83.6 kg (184 lb 4.9 oz)          Neurologic: sleeping on side with bipap, responds slowly to commands,  then with mask off answers questions and can speak.  Slightly weak with tongue protrusion and eye closure but 4/5 hand squeeze.   Cardiovascular: *RR   Respiratory: clear   GI: non tender   Skin/Extremities:  Warm and and  dry    Lines: No erythema or discharge at entry site for No invasive lines  Current lines are required for patient management    Medications        pyridostigmine  180 mg Oral BID     methylPREDNISolone  10 mg Intravenous Daily     oseltamivir  30 mg Oral BID       Data     Recent Labs  Lab 12/29/17  0528 12/29/17  0446   WBC  --  13.7*   HGB  --  12.8*   MCV  --  96   PLT  --  382   INR 1.06  --    NA  --  137   POTASSIUM  --  4.5   CHLORIDE  --  104   CO2  --  29   BUN  --  35*   CR  --  1.17   ANIONGAP  --  4   DALTON  --  8.5   GLC  --  217*   ALBUMIN  --  2.9*   PROTTOTAL  --  6.9   BILITOTAL  --  0.4   ALKPHOS  --  78   ALT  --  14   AST  --  14   TROPI  --  0.037     Recent Results (from the past 24 hour(s))   XR Chest 1 View    Narrative    XR CHEST 1 VW  12/29/2017 5:08 AM      HISTORY: Cough, fever.      COMPARISON: 8/5/2016.    FINDINGS: The heart is at the upper limits of normal in size without  pulmonary edema. Minimal atelectasis or scar at the left lung base.  The lungs are otherwise clear. No pneumothorax.      Impression    IMPRESSION: No acute abnormality.    TOMY HERNANDEZ MD

## 2017-12-29 NOTE — IP AVS SNAPSHOT
` `     [place] [address] MED[facility]: [phone]            Medication Administration Report for Mariano Clemente as of 01/03/18 1319   Legend:    Given Hold Not Given Due Canceled Entry Other Actions    Time Time (Time) Time  Time-Action       Inactive    Active    Linked        Medications 12/28/17 12/29/17 12/30/17 12/31/17 01/01/18 01/02/18 01/03/18    acetaminophen (TYLENOL) tablet 650 mg  Dose: 650 mg Freq: EVERY 4 HOURS PRN Route: PO  PRN Reason: mild pain  Start: 12/29/17 1115   Admin Instructions: Maximum acetaminophen dose from all sources = 75 mg/kg/day not to exceed 4 grams/day.                      albuterol neb solution 2.5 mg  Dose: 2.5 mg Freq: EVERY 2 HOURS PRN Route: NEBULIZATION  PRN Reasons: wheezing,shortness of breath / dyspnea  Start: 12/29/17 1121              enoxaparin (LOVENOX) injection 40 mg  Dose: 40 mg Freq: EVERY 24 HOURS Route: SC  Start: 12/29/17 1000     1148 (40 mg)-Given        1033 (40 mg)-Given        1146 (40 mg)-Given        0942 (40 mg)-Given        0936 (40 mg)-Given        0949 (40 mg)-Given           glucose 40 % gel 15-30 g  Dose: 15-30 g Freq: EVERY 15 MIN PRN Route: PO  PRN Reason: low blood sugar  Start: 01/01/18 1243   Admin Instructions: Give 15 g for BG 51 to 69 mg/dL IF patient is conscious and able to swallow. Give 30 g for BG less than or equal to 50 mg/dL IF patient is conscious and able to swallow. Do NOT give glucose gel via enteral tube.  IF patient has enteral tube: give apple juice 120 mL (4 oz or 15 g of CHO) via enteral tube for BG 51 to 69 mg/dL.  Give apple juice 240 mL (8 oz or 30 g of CHO) via enteral tube for BG less than or equal to 50 mg/dL.    ~Oral gel is preferable for conscious and able to swallow patient.   ~IF gel unavailable or patient refuses may provide apple juice 120 mL (4 oz or 15 g of CHO). Document juice on I and O flowsheet.              Or  dextrose 50 % injection 25-50 mL  Dose: 25-50 mL Freq: EVERY 15 MIN PRN Route: IV  PRN  Reason: low blood sugar  Start: 01/01/18 1243   Admin Instructions: Use if have IV access, BG less than 70 mg/dL and meet dose criteria below:  Dose if conscious and alert (or disorientated) and NPO = 25 mL  Dose if unconscious / not alert = 50 mL  Vesicant. For ordered doses up to 25 mg, give IV Push undiluted. Give each 5g over 1 minute.              Or  glucagon injection 1 mg  Dose: 1 mg Freq: EVERY 15 MIN PRN Route: SC  PRN Reason: low blood sugar  PRN Comment: May repeat x 1 only  Start: 01/01/18 1243   Admin Instructions: May give SQ or IM. ONLY use glucagon IF patient has NO IV access AND is UNABLE to swallow AND blood glucose is LESS than or EQUAL to 50 mg/dL.  Give IV Push over 1 minute. Reconstitute with 1mL sterile water.               insulin aspart (NovoLOG) inj (RAPID ACTING)  Dose: 1-5 Units Freq: AT BEDTIME Route: SC  Start: 01/01/18 2200   Admin Instructions: MEDIUM INSULIN RESISTANCE DOSING    Do Not give Bedtime Correction Insulin if BG less than  200.   For  - 249 give 1 units.   For  - 299 give 2 units.   For  - 349 give 3 units.   For  -399 give 4 units.   For BG greater than or equal to 400 give 5 units.  Notify provider if glucose greater than or equal to 350 mg/dL after administration of correction dose.  If given at mealtime, must be administered 5 min before meal or immediately after.         (2156)-Not Given        (2205)-Not Given        [ ] 2200           insulin aspart (NovoLOG) inj (RAPID ACTING)  Dose: 1-7 Units Freq: 3 TIMES DAILY BEFORE MEALS Route: SC  Start: 01/01/18 1245   Admin Instructions: Correction Scale - MEDIUM INSULIN RESISTANCE DOSING     Do Not give Correction Insulin if Pre-Meal BG less than 140.   For Pre-Meal  - 189 give 1 unit.   For Pre-Meal  - 239 give 2 units.   For Pre-Meal  - 289 give 3 units.   For Pre-Meal  - 339 give 4 units.   For Pre-Meal - 399 give 5 units.   For Pre-Meal -449 give 6 units  For  Pre-Meal BG greater than or equal to 450 give 7 units.   To be given with prandial insulin, and based on pre-meal blood glucose.    Notify provider if glucose greater than or equal to 350 mg/dL after administration of correction dose.  If given at mealtime, must be administered 5 min before meal or immediately after.         1251 (1 Units)-Given [C]       1739 (3 Units)-Given        (0829)-Not Given       1218 (1 Units)-Given       1716 (2 Units)-Given [C]        (0950)-Not Given       1236 (1 Units)-Given       [ ] 1700           insulin glargine (LANTUS) injection 8 Units  Dose: 8 Units Freq: DAILY Route: SC  Start: 01/01/18 0900   Admin Instructions: *Not for IV use, SQ only.  Do not mix with other insulins*         0942 (8 Units)-Given        0829 (8 Units)-Given        0950 (8 Units)-Given           ipratropium - albuterol 0.5 mg/2.5 mg/3 mL (DUONEB) neb solution 3 mL  Dose: 3 mL Freq: EVERY 6 HOURS PRN Route: NEBULIZATION  PRN Reason: wheezing  Start: 12/31/17 1915              naloxone (NARCAN) injection 0.1-0.4 mg  Dose: 0.1-0.4 mg Freq: EVERY 2 MIN PRN Route: IV  PRN Reason: opioid reversal  Start: 12/29/17 0807   Admin Instructions: For respiratory rate LESS than or EQUAL to 8.  Partial reversal dose:  0.1 mg titrated q 2 minutes for Analgesia Side Effects Monitoring Sedation Level of 3 (frequently drowsy, arousable, drifts to sleep during conversation).Full reversal dose:  0.4 mg bolus for Analgesia Side Effects Monitoring Sedation Level of 4 (somnolent, minimal or no response to stimulation).  For ordered doses up to 2mg give IVP. Give each 0.4mg over 15 seconds in emergency situations. For non-emergent situations further dilute in 9mL of NS to facilitate titration of response.               ondansetron (ZOFRAN-ODT) ODT tab 4 mg  Dose: 4 mg Freq: EVERY 6 HOURS PRN Route: PO  PRN Reasons: nausea,vomiting  Start: 12/29/17 0807   Admin Instructions: This is Step 1 of nausea and vomiting management.  If  nausea not resolved in 15 minutes, go to Step 2 prochlorperazine (COMPAZINE). Do not push through foil backing. Peel back foil and gently remove. Place on tongue immediately. Administration with liquid unnecessary              Or  ondansetron (ZOFRAN) injection 4 mg  Dose: 4 mg Freq: EVERY 6 HOURS PRN Route: IV  PRN Reasons: nausea,vomiting  Start: 12/29/17 0807   Admin Instructions: This is Step 1 of nausea and vomiting management.  If nausea not resolved in 15 minutes, go to Step 2 prochlorperazine (COMPAZINE).  Irritant. For ordered doses up to 4 mg, give IV Push undiluted over 2-5 minutes.               predniSONE (DELTASONE) tablet 10 mg  Dose: 10 mg Freq: DAILY Route: PO  Start: 01/02/18 0900         0829 (10 mg)-Given        0949 (10 mg)-Given           pyridostigmine (MESTINON) CR tablet 180 mg  Dose: 180 mg Freq: 2 TIMES DAILY Route: PO  Start: 12/30/17 0900   Admin Instructions: DO NOT CRUSH.       0753 (180 mg)-Given       2206 (180 mg)-Given        0804 (180 mg)-Given       2150 (180 mg)-Given        0942 (180 mg)-Given       2243 (180 mg)-Given        0829 (180 mg)-Given       2052 (180 mg)-Given        0949 (180 mg)-Given       [ ] 2100           sodium chloride (PF) 0.9% PF flush 3 mL  Dose: 3 mL Freq: EVERY 8 HOURS Route: IK  Start: 12/31/17 0530       0520 (3 mL)-Given       1442 (3 mL)-Given       2153 (3 mL)-Given        0440 (3 mL)-Given              2243 (3 mL)-Given        0829 (3 mL)-Given       1602 (3 mL)-Given        0134 (3 mL)-Given       0954 (3 mL)-Given       [ ] 1600          Completed Medications  Medications 12/28/17 12/29/17 12/30/17 12/31/17 01/01/18 01/02/18 01/03/18         Dose: 30 mg Freq: ONCE Route: PO  Indications of Use: INFLUENZA  Start: 01/01/18 2230   End: 01/02/18 0006   Admin Instructions: Will dispense 1 dose as oral solution, due to availability    Dose adjusted per renal dosing policy.  Estimated CrCl = 30-60 mL/min.          0006 (30 mg)-Given            Discontinued Medications  Medications 12/28/17 12/29/17 12/30/17 12/31/17 01/01/18 01/02/18 01/03/18         Dose: 650 mg Freq: EVERY 4 HOURS PRN Route: RE  PRN Reason: mild pain  Start: 12/29/17 1115   End: 12/31/17 1456   Admin Instructions: Maximum acetaminophen dose from all sources = 75 mg/kg/day not to exceed 4 grams/day.      1148 (650 mg)-Given         1456-Med Discontinued            Dose: 250 mg Freq: 2 TIMES DAILY Route: PO  Indications of Use: URINARY TRACT INFECTION  Start: 12/31/17 1045   End: 01/02/18 1425   Admin Instructions: Administer at least 2 hours before or 4 hours after aluminum, calcium, iron, zinc or magnesium containing medications. May be taken with food or on an empty stomach.  Do not administer alone with a dairy product like milk or yogurt or calcium-fortified juice,  but may be administered with a meal containing dairy.        1146 (250 mg)-Given       2153 (250 mg)-Given        0942 (250 mg)-Given       2243 (250 mg)-Given        0829 (250 mg)-Given       1425-Med Discontinued          Dose: 15-30 g Freq: EVERY 15 MIN PRN Route: PO  PRN Reason: low blood sugar  Start: 12/29/17 1023   End: 01/02/18 1422   Admin Instructions: Give 15 g for BG 51 to 69 mg/dL IF patient is conscious and able to swallow. Give 30 g for BG less than or equal to 50 mg/dL IF patient is conscious and able to swallow. Do NOT give glucose gel via enteral tube.  IF patient has enteral tube: give apple juice 120 mL (4 oz or 15 g of CHO) via enteral tube for BG 51 to 69 mg/dL.  Give apple juice 240 mL (8 oz or 30 g of CHO) via enteral tube for BG less than or equal to 50 mg/dL.    ~Oral gel is preferable for conscious and able to swallow patient.   ~IF gel unavailable or patient refuses may provide apple juice 120 mL (4 oz or 15 g of CHO). Document juice on I and O flowsheet.          1422-Med Discontinued       Or    Dose: 25-50 mL Freq: EVERY 15 MIN PRN Route: IV  PRN Reason: low blood sugar  Start: 12/29/17  1023   End: 01/02/18 1422   Admin Instructions: Use if have IV access, BG less than 70 mg/dL and meet dose criteria below:  Dose if conscious and alert (or disorientated) and NPO = 25 mL  Dose if unconscious / not alert = 50 mL  Vesicant. For ordered doses up to 25 mg, give IV Push undiluted. Give each 5g over 1 minute.          1422-Med Discontinued       Or    Dose: 1 mg Freq: EVERY 15 MIN PRN Route: SC  PRN Reason: low blood sugar  PRN Comment: May repeat x 1 only  Start: 12/29/17 1023   End: 01/02/18 1422   Admin Instructions: May give SQ or IM. ONLY use glucagon IF patient has NO IV access AND is UNABLE to swallow AND blood glucose is LESS than or EQUAL to 50 mg/dL.  Give IV Push over 1 minute. Reconstitute with 1mL sterile water.          1422-Med Discontinued          Dose: 0.5 mL Freq: PRIOR TO DISCHARGE Route: IM  Start: 12/30/17 1000   End: 01/01/18 1251   Admin Instructions: Administer when afebrile (less than 100.4F) x 24 hours, or Prior to Discharge.  If not administering when scheduled , change the due time by following the instructions in the reference link below.  If patient refuses vaccine, chart as Vaccine Refused.         1249-Hold       1251-Med Discontinued           Dose: 1-12 Units Freq: EVERY 4 HOURS Route: SC  Start: 12/29/17 1030   End: 01/01/18 1244   Admin Instructions: Correction Scale - HIGH INSULIN RESISTANCE DOSING     Do Not give Correction Insulin if BG less than 140  For  - 164 give 2 unit.  For  - 189 give 3 units.  For  - 214 give 4 units.  For  - 239 give 6 units.  For  - 264 give 7 units.  For  - 289 give 8 units.  For  - 314 give 9 units.  For  - 339 give 10 units  For  - 364 give 12 units  For  - 389 give 14 units  For  - 414 give 15 units  For BG greater than or equal to 415 give 16 units  Check blood glucose Q4H and administer based on blood glucose.  Notify provider if glucose greater than or equal to 350  mg/dL after administration of correction dose.  If given at mealtime, must be administered 5 min before meal or immediately after.      (1116)-Not Given       1157 (4 Units)-Given       1648 (4 Units)-Given [C]       2006 (5 Units)-Given [C]        0033 (2 Units)-Given [C]       0439 (3 Units)-Given [C]       0751 (2 Units)-Given       1151 (7 Units)-Given       1601 (9 Units)-Given [C]       2002 (4 Units)-Given [C]       2353 (3 Units)-Given [C]        (0332)-Not Given [C]       (0751)-Not Given [C]       1218 (10 Units)-Given [C]       (1730)-Not Given [C]       2151 (6 Units)-Given        0003 (3 Units)-Given [C]       (0442)-Not Given [C]       (0855)-Not Given [C]       1244-Med Discontinued  (1249)-Not Given               Dose: 3 mL Freq: 4 TIMES DAILY Route: NEBULIZATION  Start: 12/29/17 1200   End: 12/31/17 1906     1152 (3 mL)-Given       1510 (3 mL)-Given       1955 (3 mL)-Given        0807 (3 mL)-Given       1127 (3 mL)-Given       (1526)-Not Given       1925 (3 mL)-Given        0725 (3 mL)-Given       1113 (3 mL)-Given       1533 (3 mL)-Given       1906-Med Discontinued            Dose: 30 mg Freq: 2 TIMES DAILY Route: PO  Indications of Use: INFLUENZA  Start: 12/29/17 0900   End: 01/02/18 0844   Admin Instructions: Dose adjusted per renal dosing policy.  Estimated CrCl = 30-60 mL/min. <br>      1035 (30 mg)-Given       2113 (30 mg)-Given        0753 (30 mg)-Given       2206 (30 mg)-Given        0804 (30 mg)-Given       2150 (30 mg)-Given        0947 (30 mg)-Given       (2241)-Not Given        0844-Med Discontinued          Dose: 75 mg Freq: 2 TIMES DAILY Route: PO  Indications of Use: INFLUENZA  Start: 01/02/18 0900   End: 01/03/18 1049   Admin Instructions: Dose adjusted per renal dosing policy.  Estimated CrCl = 65ml/min<br>          0934 (75 mg)-Given       2052 (75 mg)-Given        1022 (75 mg)-Given       1049-Med Discontinued         Dose: 15 mg Freq: DAILY Route: PO  Start: 12/31/17 1200   End:  01/01/18 1251       1146 (15 mg)-Given        0941 (15 mg)-Given       1251-Med Discontinued      Medications 12/28/17 12/29/17 12/30/17 12/31/17 01/01/18 01/02/18 01/03/18

## 2017-12-29 NOTE — IP AVS SNAPSHOT
[place] [address] MEDICAL SURGICAL: [phone]                                              INTERAGENCY TRANSFER FORM - LAB / IMAGING / EKG / EMG RESULTS   2017                    Hospital Admission Date: 2017  KARLI BUTTERFIELD   : 10/6/1931  Sex: Male        Attending Provider: Manny Joy MD     Allergies:  Asa [Aspirin], Penicillins, Strawberry    Infection:  None   Service:  HOSPITALIST    Ht:  1.829 m (6')   Wt:  81.1 kg (178 lb 12.8 oz)   Admission Wt:  81.6 kg (180 lb)    BMI:  24.25 kg/m 2   BSA:  2.03 m 2            Patient PCP Information     Provider PCP Type    [facility]         Lab Results - 3 Days      Glucose by meter [[id number]] (Abnormal)  Resulted: 18 1130, Result status: Final result    Ordering provider: Manny Joy MD  18 1126 Resulting lab: POINT OF CARE TEST, GLUCOSE    Specimen Information    Type Source Collected On     18 1126          Components       Value Reference Range Flag Lab   Glucose 185 70 - 99 mg/dL H 170            Glucose by meter [[id number]] (Abnormal)  Resulted: 18 0804, Result status: Final result    Ordering provider: Manny Joy MD  18 0741 Resulting lab: POINT OF CARE TEST, GLUCOSE    Specimen Information    Type Source Collected On     18 0741          Components       Value Reference Range Flag Lab   Glucose 103 70 - 99 mg/dL H 170            Blood culture [[id number]]  Resulted: 18 0757, Result status: Preliminary result    Ordering provider: Sneha Goldstein MD  17 7030 Resulting lab: INFECTIOUS DISEASE DIAGNOSTIC LABORATORY    Specimen Information    Type Source Collected On   Blood  17 1539   Comment:  Left Hand          Components       Value Reference Range Flag Lab   Specimen Description Blood Left Hand      Special Requests Aerobic and anaerobic bottles received   75   Culture Micro No growth after 5 days   225            Blood culture ONE site  [[id number]]  Resulted: 01/03/18 0757, Result status: Preliminary result    Ordering provider: Sneha Goldstein MD  12/29/17 0507 Resulting lab: INFECTIOUS DISEASE DIAGNOSTIC LABORATORY    Specimen Information    Type Source Collected On   Blood Arm, Right 12/29/17 0506   Comment:  Right Arm          Components       Value Reference Range Flag Lab   Specimen Description Blood Right Arm      Special Requests Aerobic and anaerobic bottles received   [facility]   Culture Micro No growth after 5 days   225            Glucose by meter [[id number]] (Abnormal)  Resulted: 01/03/18 0206, Result status: Final result    Ordering provider: Manny Joy MD  01/03/18 0132 Resulting lab: POINT OF CARE TEST, GLUCOSE    Specimen Information    Type Source Collected On     01/03/18 0132          Components       Value Reference Range Flag Lab   Glucose 113 70 - 99 mg/dL H 170            Glucose by meter [[id number]] (Abnormal)  Resulted: 01/02/18 2211, Result status: Final result    Ordering provider: Manny Joy MD  01/02/18 2204 Resulting lab: POINT OF CARE TEST, GLUCOSE    Specimen Information    Type Source Collected On     01/02/18 2204          Components       Value Reference Range Flag Lab   Glucose 159 70 - 99 mg/dL H 170            Glucose by meter [[id number]] (Abnormal)  Resulted: 01/02/18 1721, Result status: Final result    Ordering provider: Manny Joy MD  01/02/18 1715 Resulting lab: POINT OF CARE TEST, GLUCOSE    Specimen Information    Type Source Collected On     01/02/18 1715          Components       Value Reference Range Flag Lab   Glucose 209 70 - 99 mg/dL H 170            Glucose by meter [[id number]] (Abnormal)  Resulted: 01/02/18 1236, Result status: Final result    Ordering provider: Manny Joy MD  01/02/18 1216 Resulting lab: POINT OF CARE TEST, GLUCOSE    Specimen Information    Type Source Collected On     01/02/18 1216          Components       Value Reference Range Flag Lab   Glucose 170 70 -  99 mg/dL H 170            Glucose by meter [[id number]]  Resulted: 01/02/18 0906, Result status: Final result    Ordering provider: Manny Joy MD  01/02/18 0817 Resulting lab: POINT OF CARE TEST, GLUCOSE    Specimen Information    Type Source Collected On     01/02/18 0817          Components       Value Reference Range Flag Lab   Glucose 82 70 - 99 mg/dL  170            Glucose by meter [[id number]] (Abnormal)  Resulted: 01/02/18 0241, Result status: Final result    Ordering provider: Manny Joy MD  01/02/18 0238 Resulting lab: POINT OF CARE TEST, GLUCOSE    Specimen Information    Type Source Collected On     01/02/18 0238          Components       Value Reference Range Flag Lab   Glucose 100 70 - 99 mg/dL H 170            Glucose by meter [[id number]] (Abnormal)  Resulted: 01/01/18 2156, Result status: Final result    Ordering provider: Manny Joy MD  01/01/18 2149 Resulting lab: POINT OF CARE TEST, GLUCOSE    Specimen Information    Type Source Collected On     01/01/18 2149          Components       Value Reference Range Flag Lab   Glucose 148 70 - 99 mg/dL H 170            Glucose by meter [[id number]] (Abnormal)  Resulted: 01/01/18 1725, Result status: Final result    Ordering provider: Manny Joy MD  01/01/18 1720 Resulting lab: POINT OF CARE TEST, GLUCOSE    Specimen Information    Type Source Collected On     01/01/18 1720          Components       Value Reference Range Flag Lab   Glucose 250 70 - 99 mg/dL H 170            Glucose by meter [[id number]] (Abnormal)  Resulted: 01/01/18 1241, Result status: Final result    Ordering provider: Manny Joy MD  01/01/18 1237 Resulting lab: POINT OF CARE TEST, GLUCOSE    Specimen Information    Type Source Collected On     01/01/18 1237          Components       Value Reference Range Flag Lab   Glucose 150 70 - 99 mg/dL H 170            Urine Culture Aerobic Bacterial [[id number]] (Abnormal)  Resulted: 01/01/18 1013, Result status: Final result     Ordering provider: Lit Potts MD  12/29/17 1120 Resulting lab: INFECTIOUS DISEASE DIAGNOSTIC LABORATORY    Specimen Information    Type Source Collected On   Midstream Urine  12/29/17 1030          Components       Value Reference Range Flag Lab   Specimen Description Midstream Urine      Special Requests Specimen received in preservative   75   Culture Micro --  A 225   Result:         50,000 to 100,000 colonies/mL  Escherichia coli              Glucose by meter [[id number]]  Resulted: 01/01/18 0821, Result status: Final result    Ordering provider: Manny Joy MD  01/01/18 0808 Resulting lab: POINT OF CARE TEST, GLUCOSE    Specimen Information    Type Source Collected On     01/01/18 0808          Components       Value Reference Range Flag Lab   Glucose 88 70 - 99 mg/dL  170            Basic metabolic panel [[id number]] (Abnormal)  Resulted: 01/01/18 0753, Result status: Final result    Ordering provider: Lit Potts MD  01/01/18 0000 Resulting lab: [facility]    Specimen Information    Type Source Collected On   Blood  01/01/18 0724          Components       Value Reference Range Flag Lab   Sodium 140 133 - 144 mmol/L  FrRdHs   Potassium 4.1 3.4 - 5.3 mmol/L  FrRdHs   Chloride 108 94 - 109 mmol/L  FrRdHs   Carbon Dioxide 28 20 - 32 mmol/L  FrRdHs   Anion Gap 4 3 - 14 mmol/L  FrRdHs   Glucose 93 70 - 99 mg/dL  FrRdHs   Urea Nitrogen 19 7 - 30 mg/dL  FrRdHs   Creatinine 0.95 0.66 - 1.25 mg/dL  FrRdHs   GFR Estimate 75 >60 mL/min/1.7m2  FrRd   Comment:  Non  GFR Calc   GFR Estimate If Black >90 >60 mL/min/1.7m2  FrRd   Comment:  African American GFR Calc   Calcium 8.4 8.5 - 10.1 mg/dL L FrRdHs            CBC with platelets [[id number]] (Abnormal)  Resulted: 01/01/18 0733, Result status: Final result    Ordering provider: Lit Potts MD  01/01/18 0000 Resulting lab: [facility]    Specimen Information    Type Source Collected On   Blood  01/01/18 0724           Components       Value Reference Range Flag Lab   WBC 9.1 4.0 - 11.0 10e9/L  FrRdHs   RBC Count 3.79 4.4 - 5.9 10e12/L L FrRdHs   Hemoglobin 11.8 13.3 - 17.7 g/dL L FrRdHs   Hematocrit 36.5 40.0 - 53.0 % L FrRdHs   MCV 96 78 - 100 fl  FrRdHs   MCH 31.1 26.5 - 33.0 pg  FrRdHs   MCHC 32.3 31.5 - 36.5 g/dL  FrRdHs   RDW 12.0 10.0 - 15.0 %  FrRdHs   Platelet Count 317 150 - 450 10e9/L  FrRdHs            Glucose by meter [[id number]]  Resulted: 01/01/18 0516, Result status: Final result    Ordering provider: Manny Joy MD  01/01/18 0439 Resulting lab: POINT OF CARE TEST, GLUCOSE    Specimen Information    Type Source Collected On     01/01/18 0439          Components       Value Reference Range Flag Lab   Glucose 95 70 - 99 mg/dL  170            Glucose by meter [[id number]] (Abnormal)  Resulted: 01/01/18 0016, Result status: Final result    Ordering provider: Manny Joy MD  12/31/17 2358 Resulting lab: POINT OF CARE TEST, GLUCOSE    Specimen Information    Type Source Collected On     12/31/17 2358          Components       Value Reference Range Flag Lab   Glucose 166 70 - 99 mg/dL H 170            Glucose by meter [[id number]] (Abnormal)  Resulted: 12/31/17 2036, Result status: Final result    Ordering provider: Manny Joy MD  12/31/17 2028 Resulting lab: POINT OF CARE TEST, GLUCOSE    Specimen Information    Type Source Collected On     12/31/17 2028          Components       Value Reference Range Flag Lab   Glucose 221 70 - 99 mg/dL H 170            Glucose by meter [[id number]] (Abnormal)  Resulted: 12/31/17 1730, Result status: Final result    Ordering provider: Manny Joy MD  12/31/17 1713 Resulting lab: POINT OF CARE TEST, GLUCOSE    Specimen Information    Type Source Collected On     12/31/17 1713          Components       Value Reference Range Flag Lab   Glucose 287 70 - 99 mg/dL H 170            Glucose by meter [[id number]] (Abnormal)  Resulted: 12/31/17 1345, Result status: Final  result    Ordering provider: Manny Joy MD  12/31/17 1341 Resulting lab: POINT OF CARE TEST, GLUCOSE    Specimen Information    Type Source Collected On     12/31/17 1341          Components       Value Reference Range Flag Lab   Glucose 270 70 - 99 mg/dL H 170            Glucose by meter [[id number]] (Abnormal)  Resulted: 12/31/17 1226, Result status: Final result    Ordering provider: Manny Joy MD  12/31/17 1216 Resulting lab: POINT OF CARE TEST, GLUCOSE    Specimen Information    Type Source Collected On     12/31/17 1216          Components       Value Reference Range Flag Lab   Glucose 321 70 - 99 mg/dL H 170            Hemoglobin A1c [[id number]] (Abnormal)  Resulted: 12/31/17 1208, Result status: Final result    Ordering provider: Sneha Goldstein MD  12/29/17 0446 Resulting lab: [facility]    Specimen Information    Type Source Collected On     12/29/17 0446          Components       Value Reference Range Flag Lab   Hemoglobin A1C 8.4 4.3 - 6.0 % H [facility]            Glucose by meter [[id number]] (Abnormal)  Resulted: 12/31/17 0756, Result status: Final result    Ordering provider: Manny Joy MD  12/31/17 0750 Resulting lab: POINT OF CARE TEST, GLUCOSE    Specimen Information    Type Source Collected On     12/31/17 0750          Components       Value Reference Range Flag Lab   Glucose 135 70 - 99 mg/dL H 170            Glucose by meter [[id number]] (Abnormal)  Resulted: 12/31/17 0341, Result status: Final result    Ordering provider: Manny Joy MD  12/31/17 0331 Resulting lab: POINT OF CARE TEST, GLUCOSE    Specimen Information    Type Source Collected On     12/31/17 0331          Components       Value Reference Range Flag Lab   Glucose 133 70 - 99 mg/dL H 170            Testing Performed By     Lab - Abbreviation Name Director Address Valid Date Range    12 - [facility] [address] [facility] [address] 05/08/15 1057 - Present    75 -  Unknown [facility] Unknown [address] [facility] [address] [address] 01/15/15 1019 - Present    170 - Unknown POINT OF CARE TEST, GLUCOSE Unknown Unknown 10/31/11 1114 - Present    225 - Unknown INFECTIOUS DISEASE DIAGNOSTIC LABORATORY Unknown [address] [facility] [address] 12/19/14 0954 - Present            Unresulted Labs (24h ago through future)    Start       Ordered    01/01/18 0600  Creatinine  EVERY THREE DAYS,   Routine     Comments:  Last Lab Result: Creatinine (mg/dL)       Date                     Value                 12/29/2017               1.17             ----------    12/29/17 1039    01/01/18 0600  Platelet count  EVERY THREE DAYS,   Routine     Comments:  If no result is listed, this lab has not been done the past 365 days. LATEST LAB RESULT: Platelet Count (10e9/L)       Date                     Value                 12/29/2017               382              ----------    12/29/17 1039      Encounter-Level Documents:     There are no encounter-level documents.      Order-Level Documents:     There are no order-level documents.

## 2017-12-29 NOTE — IP AVS SNAPSHOT
` [place] [address] MEDICAL SURGICAL: [phone]                                              INTERAGENCY TRANSFER FORM - NURSING   2017                    Hospital Admission Date: 2017  KARLI BUTTERFIELD   : 10/6/1931  Sex: Male        Attending Provider: Manny Joy MD     Allergies:  Asa [Aspirin], Penicillins, Strawberry    Infection:  None   Service:  HOSPITALIST    Ht:  1.829 m (6')   Wt:  81.1 kg (178 lb 12.8 oz)   Admission Wt:  81.6 kg (180 lb)    BMI:  24.25 kg/m 2   BSA:  2.03 m 2            Patient PCP Information     Provider PCP Type    [facility]      Current Code Status     Date Active Code Status Order ID Comments User Context       2017  8:07 AM Full Code [id number]  Manny Joy MD Inpatient       Code Status History     Date Active Date Inactive Code Status Order ID Comments User Context    8/10/2016 12:26 PM 2017  8:07 AM Full Code [id number]  Giuliano Ramirez MD Outpatient    2016  1:43 AM 8/10/2016 12:26 PM Full Code [id number]  Jluis Thrasher MD ED      Advance Directives        Does patient have a scanned Advance Directive/ACP document in EPIC?           No (POLST on front of chart)        Hospital Problems as of 1/3/2018              Priority Class Noted POA    Acute respiratory failure with hypoxia and hypercarbia (H) Medium  2017 Yes      Non-Hospital Problems as of 1/3/2018              Priority Class Noted    Altered mental status Medium  2016    CVA (cerebral vascular accident) (H) Medium  2016      Immunizations     None         END      ASSESSMENT     Discharge Profile Flowsheet     EXPECTED DISCHARGE     FINAL RESOURCES      Expected Discharge Date  18 ([facility]) 18 1113   Resources List  Skilled Nursing Facility 18 1143    DISCHARGE NEEDS ASSESSMENT     Skilled Nursing Facility  [facility]) [phone], Fax: [phone] 18 3743  "   Patient/family verbalizes understanding of discharge plan recommendations?  Yes 01/03/18 1143   PAS Number  [id number] 08/10/16 1521    Medical Team notified of plan?  yes 01/03/18 1143   Senior Linkage Line Referral Placed  08/10/16 08/10/16 1521    Equipment Currently Used at Home  -- (Facility reports wheelchair and FWW) 01/02/18 1419   Referrals Placed  Emergent Admission to SNF/TCU 08/10/16 1521    # of Referrals Placed by CTS  Post Acute Facilities 08/10/16 1424   SKIN      GASTROINTESTINAL (ADULT,PEDIATRIC,OB)     Inspection of bony prominences  Full 01/03/18 1116    GI WDL  WDL 01/03/18 1116   Inspection under devices  Full 01/03/18 0138    All Quadrants Bowel Sounds  audible and normoactive 01/03/18 0138   Skin WDL  WDL 01/03/18 1116    Last Bowel Movement  01/03/18 01/03/18 0145   SAFETY      GI Signs/Symptoms  diarrhea 01/01/18 0031   Safety WDL  WDL 01/03/18 1115    Passing flatus  yes 01/02/18 1818   Safety Equipment  oxygen flowmeter;manual resuscitator/mask/valve in room;suction equipment 12/31/17 0601    COMMUNICATION ASSESSMENT     All Alarms  alarm(s) activated and audible 01/03/18 1115    Patient's communication style  spoken language (English or Bilingual) 08/05/16 2017                      Assessment WDL (Within Defined Limits) Definitions           Safety WDL     Effective: 09/28/15    Row Information: <b>WDL Definition:</b> Bed in low position, wheels locked; call light in reach; upper side rails up x 2; ID band on<br> <font color=\"gray\"><i>Item=AS safety wdl>>List=AS safety wdl>>Version=F14</i></font>      Skin WDL     Effective: 09/28/15    Row Information: <b>WDL Definition:</b> Warm; dry; intact; elastic; without discoloration; pressure points without redness<br> <font color=\"gray\"><i>Item=AS skin wdl>>List=AS skin wdl>>Version=F14</i></font>      Vitals     Vital Signs Flowsheet     VITAL SIGNS     Height Method  Stated 12/30/17 0724    Temp  97.5  F (36.4  C) 01/03/18 1115   Weight  " 81.1 kg (178 lb 12.8 oz) 01/03/18 0508    Temp src  Oral 01/03/18 1115   Weight Method  Bed scale 01/03/18 0508    Resp  20 01/03/18 1115   Bed Scale  Standard (fitted sheet, draw sheet/ pad, cover/flat sheet, blanket, two pillows);Draw sheet/ pad (add comment for number);Call light (1  pad) 01/01/18 0459    Pulse  64 01/01/18 0002   BSA (Calculated - sq m)  2.06 12/30/17 0724    Heart Rate  54 01/03/18 1115   BMI (Calculated)  25.05 12/30/17 0724    Pulse/Heart Rate Source  Monitor 01/03/18 1115   POSITIONING      BP  137/53 01/03/18 1115   Body Position  independently positioning 01/03/18 1115    BP Location  Right arm 01/03/18 1115   Head of Bed (HOB)  HOB at 60-90 degrees 01/03/18 1115    OXYGEN THERAPY     Chair  Upright in chair 01/03/18 1115    SpO2  94 % 01/03/18 1115   ECG      O2 Device  None (Room air) 01/03/18 1115   ECG Rhythm  Sinus rhythm;First degree AV block 12/31/17 0426    FiO2 (%)  35 % 12/30/17 0721   Ectopy  PAC 12/31/17 0426    Oxygen Delivery  4 LPM 12/30/17 1129   Lead Monitored  Lead II 12/31/17 0001    PAIN/COMFORT     DAILY CARE      Patient Currently in Pain  denies 01/03/18 1113   Activity Management  activity adjusted per tolerance 01/03/18 1115    Preferred Pain Scale  number (Numeric Rating Pain Scale) 01/01/18 0002   Activity Assistance Provided  assistance, 1 person 01/03/18 1115    Patient's Stated Pain Goal  No pain 01/01/18 0002   Assistive Device Utilized  walker 01/03/18 1115    0-10 Pain Scale  0 01/01/18 0002   POINT OF CARE TESTING      HEIGHT AND WEIGHT     Puncture Site  fingertip 12/31/17 0426    Height  1.829 m (6') 12/30/17 0724   Bedside Glucose (mg/dl )   133 mg/dl 12/31/17 0545            Patient Lines/Drains/Airways Status    Active LINES/DRAINS/AIRWAYS     Name: Placement date: Placement time: Site: Days: Last dressing change:    Peripheral IV 12/31/17 Left;Anterior Lower forearm 12/31/17   0400   Lower forearm   3             Patient  Lines/Drains/Airways Status    Active PICC/CVC     None            Intake/Output Detail Report     Date Intake     Output Net    Shift P.O. I.V. IV Piggyback Total Urine Total       Noc 01/01/18 2300 - 01/02/18 0659 -- -- -- -- -- -- 0    Day 01/02/18 0700 - 01/02/18 1459 540 -- -- 540 -- -- 540    Mirlande 01/02/18 1500 - 01/02/18 2259 240 -- -- 240 -- -- 240    Noc 01/02/18 2300 - 01/03/18 0659 -- -- -- -- -- -- 0    Day 01/03/18 0700 - 01/03/18 1459 -- -- -- -- -- -- 0      Last Void/BM       Most Recent Value    Urine Occurrence 1 at 01/03/2018 0500    Stool Occurrence 1 at 01/03/2018 0150      Case Management/Discharge Planning     Case Management/Discharge Planning Flowsheet     REFERRAL INFORMATION     DISCHARGE PLANNING      Admission Type  inpatient 01/03/18 1143   Patient/family verbalizes understanding of discharge plan recommendations?  Yes 01/03/18 1143    Arrived From  long-term care 01/03/18 1143   Medical Team notified of plan?  yes 01/03/18 1143    Referral Source  nursing 01/03/18 1143   FINAL RESOURCES      New Steerage to  Clinics?  No 01/03/18 1143   Equipment Currently Used at Home  -- (Facility reports wheelchair and FWW) 01/02/18 1419    # of Referrals Placed by CTS  Post Acute Facilities 08/10/16 1424   Resources List  Skilled Nursing Facility 01/03/18 1143    Reason For Consult  discharge planning 01/03/18 1143   Skilled Nursing Facility  [facility]) [phone], Fax: [phone] 01/03/18 1143    CTS Assigned to Case  Sarah Beth Christensen 01/03/18 1134   PAS Number  [id number] 08/10/16 1521    Primary Care Clinic Name  [facility] 01/03/18 1143   Senior Linkage Line Referral Placed  08/10/16 08/10/16 1521    LIVING ENVIRONMENT     Referrals Placed  Emergent Admission to SNF/TCU 08/10/16 1521    Lives With  facility resident 01/02/18 1400   ABUSE RISK SCREEN      Living Arrangements  extended care [facility] 01/03/18 1143   QUESTION TO PATIENT:  Has a member of  your family or a partner(now or in the past) intimidated, hurt, manipulated, or controlled you in any way?  no 12/29/17 0433    Quality Of Family Relationships  supportive;helpful;involved 01/03/18 1143   QUESTION TO PATIENT: Do you feel safe going back to the place where you are living?  yes 12/29/17 0433    Able to Return to Prior Living Arrangements  yes 01/03/18 1143   OBSERVATION: Is there reason to believe there has been maltreatment of a vulnerable adult (ie. Physical/Sexual/Emotional abuse, self neglect, lack of adequate food, shelter, medical care, or financial exploitation)?  no 12/29/17 0433    COPING/STRESS     (R) MENTAL HEALTH SUICIDE RISK      Major Change/Loss/Stressor  none 12/29/17 1136   Are you depressed or being treated for depression?  No 12/29/17 1132    EXPECTED DISCHARGE     HOMICIDE RISK      Expected Discharge Date  01/03/18 ([facility]) 01/02/18 1113   Feels Like Hurting Others  no 12/29/17 0437

## 2017-12-29 NOTE — H&P
IDENTIFICATION AND CHIEF COMPLAINT:  Mr. Mariano Clemente is an 86-year-old gentleman who resides in a nursing home, who was sent to the emergency department for evaluation due to cough and fever that was identified at his residence.      HISTORY OF PRESENT ILLNESS:  Mr. Clemente is very limited in his speech.  It is difficult to tell if this is because he is feeling sick and is extremely fatigued after having been up most of the night, or whether this is due to some cognitive failure.  I spoke with his wife by telephone.  She indicated to me that he is cognitively intact, but subsequently clarifies that he has been having more difficulty with memory trouble.  Notably, the patient has myasthenia gravis, and at first I was really quite concerned about this, as it is difficult to tell early on in the process if the patient will be decompensating due to a myasthenic crisis.  The patient's wife does clarify that the patient has really only had myasthenia that has involved his lower extremities, as far as she knows.  Nevertheless, the patient does have some difficulty with talking to me, and seems to have a soft voice, and though she tells me that this is really his baseline, I am concerned if it does reflect a significant amount of weakness potentially associated with myasthenia gravis.      REVIEW OF SYSTEMS:  (As noted, the patient is limited in what he will tell me.  I have to ask him very direct yes and no questions.)  Mr. Clemente appeared to have some difficulty with clearing his secretions, and sometimes would follow directions when I was evaluating him.  He indicated to me that he was having some difficulty with his breathing.  The patient does not respond to most of my review of systems, including clarifying whether he has been having vomiting or diarrhea, and whether he has had any change in his urinary habits.      PRIOR MEDICAL HISTORY:   1.  Myasthenia gravis.   2.  Type 2 diabetes.   3.  Hypertension.      PRIOR  SURGICAL HISTORY:   1.  Laparoscopic repair of ventral hernia.   2.  Jaw surgery.   3.  Remote knee arthroscopy.      SOCIAL HISTORY:  The patient quit smoking in about 2008.  He is no longer a drinker of any significant amount.  He is  and has several children close by who are involved in his life.      FAMILY MEDICAL HISTORY:  Reviewed and considered noncontributory to this hospitalization.      ALLERGIES:  Aspirin, penicillins, cephalosporins, metformin.  Reactions are not specified, though he apparently had angioedema associated with penicillins.      MEDICATIONS PRIOR TO THIS HOSPITALIZATION:   1.  Clopidogrel 75 mg p.o. daily.   2.  Aspirin 81 mg p.o. daily.   3.  Prednisone 5 mg p.o. daily.   4.  Duloxetine 60 mg p.o. daily.   5.  Pravachol 40 mg p.o. daily.   6.  Lisinopril 10 mg p.o. b.i.d.   7.  Metoprolol 12.5 mg p.o. b.i.d.   8.  Sitagliptin 5 mg p.o. daily.   9.  Glipizide 5 mg p.o. b.i.d.   10.  Spiriva 18 mcg inhaled daily.   11.  Mestinon 180 mg b.i.d.   12.  Gabapentin 300 mg p.o. at bedtime, and 600 mg p.o. q.a.m.   13.  Prednisone 5 mg p.o. daily.      OBJECTIVE:   GENERAL:  Mr. Clemente is an elderly  male resting calmly on a gurney.  He is not working very hard, but seems to have some gurgling that is difficult for him to clear.  He responds to me somewhat slowly, and has a very soft voice.  Unclear whether this is lack of will or simply due to generalized weakness.   VITAL SIGNS:  The patient was febrile at 101.3 initially.  Heart rate is in the 90s.  Blood pressure 148/77.  Respirations in the low to mid-20s, and even when he is placed on a BiPAP machine, it remains with a respiratory rate of about 24-28.  Weight on admission was 184 pounds (about 84 kg).   HEENT:  Cranium is normocephalic and atraumatic.  The eyes are without remarkable scleral icterus or conjunctival injection.  Extraocular motions are conjugate.  Pupils are equal, round, and reactive.  Nares and oropharynx are  free of obvious lesions, and there is no significant congestion.   NECK:  Supple without remarkable cervical or supraclavicular lymphadenopathy or thyromegaly.   CHEST:  Clear to auscultation without rales, wheezes, or egophony.  I note that there seems to be decreased air entry on the left, and the patient really is unable to increase his work of breathing when I ask him to.   HEART:  Regular without rubs, murmurs or gallops.   ABDOMEN:  Soft and obese, nontender, nondistended, without appreciable hepatosplenomegaly.   GENITOURINARY:  Examination is deferred by me.   MUSCULOSKELETAL:  There are no significant deformities noted over the extremities.  No remarkable pitting edema.  Distal perfusion is intact throughout.   SKIN:  Free of obvious lesions over the exposed areas of the distal extremities, abdomen, head and neck.   NEUROLOGIC:  The patient is quite limited in terms of his capacity to respond.  He appears to have difficulty in sitting forward, showing generalized weakness.  There is no evident lateralizing weakness, however.      DATA:  Comprehensive metabolic panel with creatinine of 1.17 with BUN of 35.  Carbon dioxide level is 29.  Liver function tests are normal.  Initial lactic acid was 1.3.  Initial troponin was 0.037.  Glucose 217.      Arterial blood gas shows a pH of 7.41, pCO2 of 42, pAO2 of 81, and bicarbonate level of 27.  That was on 50% FiO2.  On BiPAP, though, I note that the patient had only been on that for a couple of minutes before checking the blood gas.      CBC with white cell count 13.7 with a left shift, hemoglobin 12.8, platelet count 382,000.  INR 1.06.      Influenza A and B swabs are notable for positive influenza A screen.      Note that blood cultures have been obtained.      Chest x-ray shows no acute-appearing infiltrate.      ASSESSMENT:  Mr. Clemente is a challenging 86-year-old gentleman with a history of myasthenia gravis, who came to attention tonight with apparent cough  and fever.  He was found to be mildly febrile at this time, but positive for influenza A.  He is not working terribly hard, but on the initial venous blood gas, he appeared to be retaining some carbon dioxide.  I, of course, was primarily concerned about a myasthenic crisis.  It is not really clear, however, at this time that the patient is having significant difficulty.      Nevertheless, I started the patient on BiPAP in the emergency department.  I also spoke with both Dr. Jaci Jiménez (Pulmonary Medicine from the [facility]), and Dr. Tono Padilla (Neurology from [facility]).  Dr. Padilla indicated that unless we were prepared to intubate the patient, we should not put the patient on stress-dose steroids because this can sometimes worsen myasthenic function.  In addition, he specifically asked that we avoid antibiotics at this time, as these also can contribute to myasthenic worsening.      DIAGNOSES:   1.  Febrile illness, probably due to influenza A.   2.  Myasthenia gravis, with history of significant weakness.  Unclear if the patient is having any contribution of myasthenia to his current condition.      PLAN:   1.  Admit to the ICU.   2.  I have asked for a formal Neurology consultation.   3.  Solu-Medrol 10 mg IV daily.   4.  Continue with pyridostigmine twice daily.  It may be reasonable to increase this to three times daily, though that can exacerbate salivating.   5.  If the patient has trouble with clearing his airway secretions, that may be an issue.  We will need to monitor the patient's negative inspiratory force.  I believe he probably can participate with that evaluation.   6.  Avoid antibiotics as much as possible.   7.  Tamiflu 75 mg p.o. b.i.d.         MARTHA MCCURDY MD             D: 2017 09:09   T: 2017 09:55   MT: [id number]      Name:     KARLI BUTTERFIELD   MRN:      [id number]        Account:      [id number]   :      10/06/1931            Admitted:     [id number]      Document: [id number]       cc: [facility]

## 2017-12-30 NOTE — PLAN OF CARE
Problem: Tissue Perfusion, Ineffective Peripheral (Adult)  Goal: Identify Related Risk Factors and Signs and Symptoms  Related risk factors and signs and symptoms are identified upon initiation of Human Response Clinical Practice Guideline (CPG).  Outcome: No Change  Patient cont on bipap to maintain oxygen saturation

## 2017-12-30 NOTE — PLAN OF CARE
Problem: Tissue Perfusion, Ineffective Peripheral (Adult)  Goal: Identify Related Risk Factors and Signs and Symptoms  Related risk factors and signs and symptoms are identified upon initiation of Human Response Clinical Practice Guideline (CPG).   Outcome: No Change  ICU End of Shift Summary.  For vital signs and complete assessments, please see documentation flowsheets.     Pertinent assessments: vss, on bipap all shift, tolerated well, sats maintained, lung sounds extremely diminished, condom cath fell off  Major Shift Events:   Plan (Upcoming Events): cont bipap  Discharge/Transfer Needs: tbd    Bedside Shift Report Completed :   Bedside Safety Check Completed:

## 2017-12-30 NOTE — PROGRESS NOTES
[facility]    Hospitalist Progress Note    Date of Service (when I saw the patient): 12/30/2017    Assessment & Plan   Mr. Clemente is a challenging 86-year-old gentleman with a history of myasthenia gravis, who came to attention tonight with apparent cough and fever,  positive for influenza A.           DIAGNOSES:   1.  Influenza A with acuted respiratory failure:  On BIPAP and now off and doing well thus far this am.    Continue oxygen    Nebs    Steroids    BIPAP prn    Tamiflu 75 mg p.o. b.i.d.    2.  Myasthenia gravis, with history of significant weakness.  does not appear to be in crisis.    Steroids    Appreciate neurology consult    NIF bid     Continue with pyridostigmine twice daily.       3.  DM 2    Increase SSI    Increase Lantus to 12    Advance diet     4.  Urinary tract infection     Rocephin     Await cx result    DVT Prophylaxis: Enoxaparin (Lovenox) SQ  Code Status: Full Code    Disposition: Expected discharge in 3 days if continues to do well and no longer needing BIPAP    Lit Potts MD  Text Page    Interval History   Denies short of breath, cough, fever or pain.     -Data reviewed today: I reviewed all new labs and imaging results over the last 24 hours. I personally reviewed no images or EKG's today.    Physical Exam   Temp: 98.2  F (36.8  C) Temp src: Oral BP: 153/71   Heart Rate: 63   SpO2: 97 % O2 Device: Nasal cannula Oxygen Delivery: 4 LPM  Vitals:    12/29/17 0433 12/29/17 0806 12/30/17 0600   Weight: 81.6 kg (180 lb) 83.6 kg (184 lb 4.9 oz) 83.6 kg (184 lb 4.9 oz)     Vital Signs with Ranges  Temp:  [97.5  F (36.4  C)-100.3  F (37.9  C)] 98.2  F (36.8  C)  Heart Rate:  [63-87] 63  BP: ()/(41-71) 153/71  FiO2 (%):  [35 %] 35 %  SpO2:  [91 %-99 %] 97 %  I/O last 3 completed shifts:  In: 1782.5 [I.V.:1782.5]  Out: 530 [Urine:530]    Constitutional: Awake, alert, cooperative, no apparent distress, sitting in chair   Respiratory: Clear to auscultation bilaterally but  very diminished breath sounds, no crackles or wheezing   Cardiovascular: Regular rate and rhythm, normal S1 and S2, and no murmur noted   Abdomen: Normal bowel sounds, soft, non-distended, non-tender   Skin: No rashes, no cyanosis, dry to touch   Neuro: Alert and oriented x3, no weakness, numbness, memory loss   Extremities: No edema, normal range of motion   Other(s): Flat affect, does not offer much history        All other systems: Negative       Medications     dextrose 5% and 0.45% NaCl + KCl 20 mEq/L 100 mL/hr at 12/30/17 1033       [START ON 12/31/2017] insulin glargine  12 Units Subcutaneous Daily     methylPREDNISolone  10 mg Intravenous Daily     oseltamivir  30 mg Oral BID     enoxaparin  40 mg Subcutaneous Q24H     insulin aspart  1-12 Units Subcutaneous Q4H     ciprofloxacin  200 mg Intravenous Q12H     ipratropium - albuterol 0.5 mg/2.5 mg/3 mL  3 mL Nebulization 4x daily     influenza Vac Split High-Dose  0.5 mL Intramuscular Prior to discharge     pyridostigmine  180 mg Oral BID       Data     Recent Labs  Lab 12/29/17  0528 12/29/17  0446   WBC  --  13.7*   HGB  --  12.8*   MCV  --  96   PLT  --  382   INR 1.06  --    NA  --  137   POTASSIUM  --  4.5   CHLORIDE  --  104   CO2  --  29   BUN  --  35*   CR  --  1.17   ANIONGAP  --  4   DALTON  --  8.5   GLC  --  217*   ALBUMIN  --  2.9*   PROTTOTAL  --  6.9   BILITOTAL  --  0.4   ALKPHOS  --  78   ALT  --  14   AST  --  14   TROPI  --  0.037       Imaging:  No results found for this or any previous visit (from the past 24 hour(s)).

## 2017-12-30 NOTE — PROGRESS NOTES
Neurology Consult Service    85 yo man with Myasthenia Gravis and acute Influenza and high risk of decompensation of myasthenia.   Respiratory status stable with adequate tidal volumes. Tolerating and responding to Bi-pap  Continue supportive measures, mestinon and steroids.

## 2017-12-31 NOTE — PLAN OF CARE
Problem: Urinary Tract Infection (Adult)  Goal: Signs and Symptoms of Listed Potential Problems Will be Absent, Minimized or Managed (Urinary Tract Infection)  Signs and symptoms of listed potential problems will be absent, minimized or managed by discharge/transition of care (reference Urinary Tract Infection (Adult) CPG).   Outcome: Improving  Transfer from ICU this shift, focused assessment completed  O2 room air  Tolerating diabetic diet  IVF saline locked  Activity up with stand by assist with walker  Pain denies

## 2017-12-31 NOTE — PLAN OF CARE
Problem: Tissue Perfusion, Ineffective Peripheral (Adult)  Goal: Identify Related Risk Factors and Signs and Symptoms  Related risk factors and signs and symptoms are identified upon initiation of Human Response Clinical Practice Guideline (CPG).   Outcome: Improving  Patient awake on and off, denies pain, not resting well, incontinent of urine, vss, afebrile

## 2017-12-31 NOTE — PROGRESS NOTES
[facility]    Hospitalist Progress Note    Date of Service (when I saw the patient): 12/31/2017    Assessment & Plan   Mr. Clemente is a challenging 86-year-old gentleman with a history of myasthenia gravis, who came to attention tonight with apparent cough and fever,  positive for influenza A.           DIAGNOSES:   1.  Influenza A with acuted respiratory failure:  Off BIPAP > 24 hours and doing much better.  A little confused at times, unclear baseline.     Continue oxygen and wean as tolerated.     Nebs    Steroids    Tamiflu 75 mg p.o. B.i.d.    Ok to floor    2.  Myasthenia gravis, with history of significant weakness.  does not appear to be in crisis.    Steroids, change to prednisone 15 mg daily (baseline is 5 mg daily)    Appreciate neurology consult    NIF bid     Continue with pyridostigmine twice daily.       3.  DM 2    Increase SSI    Increase Lantus to 12    Advance diet     4.  Urinary tract infection, E. coli    Change to oral Cipro (avoid PCN and Ceph with allergy)      5.  Acute encephalopathy:  Suspect related to influenza and urinary tract infection    Monitor, may have some mild underlying confusion    6.  DM2 and Hyperglycemia    On no oral medications or insulin at home    Decrease insulin as glucoses allow, decrease Lantus to 8     Check A1c    DVT Prophylaxis: Enoxaparin (Lovenox) SQ  Code Status: Full Code    Disposition:  Ok transfer to floor.  Expected discharge in 1-2 days if continues to do well.      Lit Potts MD  Text Page    Interval History   Denies short of breath, cough, fever or pain.  Knows it is New Years [place] but unclear of date. Knows in hospital but cannot remember which one.     -Data reviewed today: I reviewed all new labs and imaging results over the last 24 hours. I personally reviewed no images or EKG's today.    Physical Exam   Temp: 97.8  F (36.6  C) Temp src: Oral BP: 150/66   Heart Rate: 57   SpO2: 96 % O2 Device: None (Room air) Oxygen Delivery: 4  LPM  Vitals:    12/29/17 0806 12/30/17 0600 12/31/17 0400   Weight: 83.6 kg (184 lb 4.9 oz) 83.6 kg (184 lb 4.9 oz) 82.9 kg (182 lb 12.2 oz)     Vital Signs with Ranges  Temp:  [97.8  F (36.6  C)-98.4  F (36.9  C)] 97.8  F (36.6  C)  Heart Rate:  [54-86] 57  BP: (111-150)/(46-80) 150/66  SpO2:  [92 %-98 %] 96 %  I/O last 3 completed shifts:  In: 1284.67 [P.O.:450; I.V.:834.67]  Out: 100 [Urine:100]    Constitutional: Awake, alert, cooperative, no apparent distress, sitting in chair   Respiratory: Clear to auscultation bilaterally but very diminished breath sounds, no crackles or wheezing   Cardiovascular: Regular rate and rhythm, normal S1 and S2, and no murmur noted   Abdomen: Normal bowel sounds, soft, non-distended, non-tender   Skin: No rashes, no cyanosis, dry to touch   Neuro: Alert and oriented x2, no weakness, numbness   Extremities: No edema, normal range of motion   Other(s): More alert and talkative.  Walking in hallway with walker this am.         All other systems: Negative       Medications     dextrose 5% and 0.45% NaCl + KCl 20 mEq/L Stopped (12/31/17 0753)       sodium chloride (PF)  3 mL Intracatheter Q8H     insulin glargine  12 Units Subcutaneous Daily     methylPREDNISolone  10 mg Intravenous Daily     oseltamivir  30 mg Oral BID     enoxaparin  40 mg Subcutaneous Q24H     insulin aspart  1-12 Units Subcutaneous Q4H     ciprofloxacin  200 mg Intravenous Q12H     ipratropium - albuterol 0.5 mg/2.5 mg/3 mL  3 mL Nebulization 4x daily     influenza Vac Split High-Dose  0.5 mL Intramuscular Prior to discharge     pyridostigmine  180 mg Oral BID       Data     Recent Labs  Lab 12/29/17  0528 12/29/17  0446   WBC  --  13.7*   HGB  --  12.8*   MCV  --  96   PLT  --  382   INR 1.06  --    NA  --  137   POTASSIUM  --  4.5   CHLORIDE  --  104   CO2  --  29   BUN  --  35*   CR  --  1.17   ANIONGAP  --  4   DALTON  --  8.5   GLC  --  217*   ALBUMIN  --  2.9*   PROTTOTAL  --  6.9   BILITOTAL  --  0.4   ALKPHOS   --  78   ALT  --  14   AST  --  14   TROPI  --  0.037       Imaging:  No results found for this or any previous visit (from the past 24 hour(s)).

## 2017-12-31 NOTE — PROGRESS NOTES
ICU End of Shift Summary.  For vital signs and complete assessments, please see documentation flowsheets.     Pertinent assessments: VSS-pt showing freq PAC's vs koki-MD ijsul-ezkynwnesrzj-nt lovenox. Incont urine-but UOP seems adequate. Bipap off this am and switched to 4L NC-weaned as the day progressed and pt currently on RA and sats 94%. Up to chair with assist of one. Gerber PO intake.   Major Shift Events: see above.  Plan (Upcoming Events): advance diet. Advance activity. Restart home meds. Monitor tele.   Discharge/Transfer Needs: TBD    Bedside Shift Report Completed :yes   Bedside Safety Check Completed:yes

## 2017-12-31 NOTE — PLAN OF CARE
Problem: Tissue Perfusion, Ineffective Peripheral (Adult)  Goal: Identify Related Risk Factors and Signs and Symptoms  Related risk factors and signs and symptoms are identified upon initiation of Human Response Clinical Practice Guideline (CPG).   Outcome: Improving  Patient awake all night,denies pain, oriented to self only, maintaining sats on rm air, incontinent x 3,  Lung sounds extremely diminished, new piv start left fa.  ICU End of Shift Summary.  For vital signs and complete assessments, please see documentation flowsheets.     Pertinent assessments: lung sound extremely diminished  Major Shift Events:   Plan (Upcoming Events): up grade diet, increase activity, restart any home meds needed, transfer to floor?  Discharge/Transfer Needs: tbd    Bedside Shift Report Completed :   Bedside Safety Check Completed:

## 2017-12-31 NOTE — PROGRESS NOTES
O: pt will have safe transfer to [address] [place] [address] for cont of care  D/A: pt alert/forgetful VSS. Up with walker. warner po. Void adequate. Good strong cough with yellow/tan sputum. On RA. Report called to RN.  R: pt transferred safely by wheelchair with LPN.

## 2017-12-31 NOTE — PROGRESS NOTES
Improving   Agree with transfer to floor bed.  Continue mestinon.  Continue oral steroids. Consider taper to 10 mg if patient does well through the late PM and night.

## 2018-01-01 ENCOUNTER — APPOINTMENT (OUTPATIENT)
Dept: GENERAL RADIOLOGY | Facility: CLINIC | Age: 83
DRG: 871 | End: 2018-01-01
Attending: EMERGENCY MEDICINE
Payer: COMMERCIAL

## 2018-01-01 ENCOUNTER — APPOINTMENT (OUTPATIENT)
Dept: OCCUPATIONAL THERAPY | Facility: CLINIC | Age: 83
DRG: 865 | End: 2018-01-01
Payer: COMMERCIAL

## 2018-01-01 ENCOUNTER — APPOINTMENT (OUTPATIENT)
Dept: OCCUPATIONAL THERAPY | Facility: CLINIC | Age: 83
DRG: 865 | End: 2018-01-01
Attending: INTERNAL MEDICINE
Payer: COMMERCIAL

## 2018-01-01 ENCOUNTER — HOSPITAL ENCOUNTER (INPATIENT)
Facility: CLINIC | Age: 83
LOS: 3 days | Discharge: HOSPICE | DRG: 871 | End: 2018-02-06
Attending: EMERGENCY MEDICINE | Admitting: INTERNAL MEDICINE
Payer: COMMERCIAL

## 2018-01-01 ENCOUNTER — APPOINTMENT (OUTPATIENT)
Dept: PHYSICAL THERAPY | Facility: CLINIC | Age: 83
DRG: 865 | End: 2018-01-01
Attending: INTERNAL MEDICINE
Payer: COMMERCIAL

## 2018-01-01 ENCOUNTER — APPOINTMENT (OUTPATIENT)
Dept: GENERAL RADIOLOGY | Facility: CLINIC | Age: 83
DRG: 871 | End: 2018-01-01
Attending: INTERNAL MEDICINE
Payer: COMMERCIAL

## 2018-01-01 VITALS
OXYGEN SATURATION: 94 % | DIASTOLIC BLOOD PRESSURE: 53 MMHG | HEART RATE: 64 BPM | WEIGHT: 178.8 LBS | HEIGHT: 72 IN | SYSTOLIC BLOOD PRESSURE: 137 MMHG | BODY MASS INDEX: 24.22 KG/M2 | RESPIRATION RATE: 20 BRPM | TEMPERATURE: 97.5 F

## 2018-01-01 VITALS
HEART RATE: 117 BPM | SYSTOLIC BLOOD PRESSURE: 97 MMHG | BODY MASS INDEX: 24.1 KG/M2 | OXYGEN SATURATION: 97 % | TEMPERATURE: 102 F | HEIGHT: 72 IN | RESPIRATION RATE: 26 BRPM | WEIGHT: 177.91 LBS | DIASTOLIC BLOOD PRESSURE: 60 MMHG

## 2018-01-01 DIAGNOSIS — J18.9 PNEUMONIA OF LEFT LOWER LOBE DUE TO INFECTIOUS ORGANISM: ICD-10-CM

## 2018-01-01 DIAGNOSIS — N30.00 ACUTE CYSTITIS WITHOUT HEMATURIA: ICD-10-CM

## 2018-01-01 DIAGNOSIS — R00.0 TACHYCARDIA: ICD-10-CM

## 2018-01-01 DIAGNOSIS — R09.02 HYPOXIA: ICD-10-CM

## 2018-01-01 DIAGNOSIS — Z51.5 END OF LIFE CARE: Primary | ICD-10-CM

## 2018-01-01 DIAGNOSIS — R65.20 SEVERE SEPSIS (H): ICD-10-CM

## 2018-01-01 DIAGNOSIS — N17.9 ACUTE RENAL FAILURE, UNSPECIFIED ACUTE RENAL FAILURE TYPE (H): ICD-10-CM

## 2018-01-01 DIAGNOSIS — J96.01 ACUTE RESPIRATORY FAILURE WITH HYPOXIA AND HYPERCARBIA (H): ICD-10-CM

## 2018-01-01 DIAGNOSIS — E87.20 ACIDOSIS: ICD-10-CM

## 2018-01-01 DIAGNOSIS — A41.9 SEVERE SEPSIS (H): ICD-10-CM

## 2018-01-01 DIAGNOSIS — J96.02 ACUTE RESPIRATORY FAILURE WITH HYPOXIA AND HYPERCARBIA (H): ICD-10-CM

## 2018-01-01 LAB
ALBUMIN SERPL-MCNC: 2.5 G/DL (ref 3.4–5)
ALBUMIN UR-MCNC: 100 MG/DL
ALP SERPL-CCNC: 340 U/L (ref 40–150)
ALT SERPL W P-5'-P-CCNC: 11 U/L (ref 0–70)
ANION GAP SERPL CALCULATED.3IONS-SCNC: 11 MMOL/L (ref 3–14)
ANION GAP SERPL CALCULATED.3IONS-SCNC: 14 MMOL/L (ref 6–17)
ANION GAP SERPL CALCULATED.3IONS-SCNC: 16 MMOL/L (ref 3–14)
ANION GAP SERPL CALCULATED.3IONS-SCNC: 4 MMOL/L (ref 3–14)
ANION GAP SERPL CALCULATED.3IONS-SCNC: 9 MMOL/L (ref 3–14)
APPEARANCE UR: ABNORMAL
APTT PPP: 34 SEC (ref 22–37)
APTT PPP: 45 SEC (ref 22–37)
AST SERPL W P-5'-P-CCNC: 10 U/L (ref 0–45)
BACTERIA #/AREA URNS HPF: ABNORMAL /HPF
BACTERIA SPEC CULT: ABNORMAL
BACTERIA SPEC CULT: NO GROWTH
BACTERIA SPEC CULT: NO GROWTH
BACTERIA SPEC CULT: NORMAL
BACTERIA SPEC CULT: NORMAL
BASOPHILS # BLD AUTO: 0.1 10E9/L (ref 0–0.2)
BASOPHILS NFR BLD AUTO: 1 %
BILIRUB DIRECT SERPL-MCNC: 0.3 MG/DL (ref 0–0.2)
BILIRUB SERPL-MCNC: 0.8 MG/DL (ref 0.2–1.3)
BILIRUB UR QL STRIP: NEGATIVE
BUN SERPL-MCNC: 19 MG/DL (ref 7–30)
BUN SERPL-MCNC: 41 MG/DL (ref 7–30)
BUN SERPL-MCNC: 47 MG/DL (ref 7–30)
BUN SERPL-MCNC: 48 MG/DL (ref 7–30)
BUN SERPL-MCNC: 48 MG/DL (ref 7–30)
C DIFF TOX B STL QL: NEGATIVE
CA-I BLD-SCNC: 4.1 MG/DL (ref 4.4–5.2)
CALCIUM SERPL-MCNC: 7.9 MG/DL (ref 8.5–10.1)
CALCIUM SERPL-MCNC: 7.9 MG/DL (ref 8.5–10.1)
CALCIUM SERPL-MCNC: 8.4 MG/DL (ref 8.5–10.1)
CALCIUM SERPL-MCNC: 9.2 MG/DL (ref 8.5–10.1)
CHLORIDE BLD-SCNC: 110 MMOL/L (ref 94–109)
CHLORIDE SERPL-SCNC: 108 MMOL/L (ref 94–109)
CHLORIDE SERPL-SCNC: 108 MMOL/L (ref 94–109)
CHLORIDE SERPL-SCNC: 110 MMOL/L (ref 94–109)
CHLORIDE SERPL-SCNC: 110 MMOL/L (ref 94–109)
CO2 BLD-SCNC: 16 MMOL/L (ref 20–32)
CO2 BLDCOV-SCNC: 15 MMOL/L (ref 21–28)
CO2 SERPL-SCNC: 15 MMOL/L (ref 20–32)
CO2 SERPL-SCNC: 20 MMOL/L (ref 20–32)
CO2 SERPL-SCNC: 22 MMOL/L (ref 20–32)
CO2 SERPL-SCNC: 28 MMOL/L (ref 20–32)
COLOR UR AUTO: ABNORMAL
CREAT BLD-MCNC: 2 MG/DL (ref 0.66–1.25)
CREAT SERPL-MCNC: 0.95 MG/DL (ref 0.66–1.25)
CREAT SERPL-MCNC: 1.7 MG/DL (ref 0.66–1.25)
CREAT SERPL-MCNC: 1.97 MG/DL (ref 0.66–1.25)
CREAT SERPL-MCNC: 2.03 MG/DL (ref 0.66–1.25)
D DIMER PPP FEU-MCNC: 4.2 UG/ML FEU (ref 0–0.5)
DIFFERENTIAL METHOD BLD: ABNORMAL
EOSINOPHIL # BLD AUTO: 0 10E9/L (ref 0–0.7)
EOSINOPHIL NFR BLD AUTO: 0 %
ERYTHROCYTE [DISTWIDTH] IN BLOOD BY AUTOMATED COUNT: 12 % (ref 10–15)
ERYTHROCYTE [DISTWIDTH] IN BLOOD BY AUTOMATED COUNT: 13.5 % (ref 10–15)
ERYTHROCYTE [DISTWIDTH] IN BLOOD BY AUTOMATED COUNT: 13.9 % (ref 10–15)
GFR SERPL CREATININE-BSD FRML MDRD: 31 ML/MIN/1.7M2
GFR SERPL CREATININE-BSD FRML MDRD: 32 ML/MIN/1.7M2
GFR SERPL CREATININE-BSD FRML MDRD: 32 ML/MIN/1.7M2
GFR SERPL CREATININE-BSD FRML MDRD: 38 ML/MIN/1.7M2
GFR SERPL CREATININE-BSD FRML MDRD: 75 ML/MIN/1.7M2
GLUCOSE BLD-MCNC: 253 MG/DL (ref 70–99)
GLUCOSE BLDC GLUCOMTR-MCNC: 100 MG/DL (ref 70–99)
GLUCOSE BLDC GLUCOMTR-MCNC: 103 MG/DL (ref 70–99)
GLUCOSE BLDC GLUCOMTR-MCNC: 113 MG/DL (ref 70–99)
GLUCOSE BLDC GLUCOMTR-MCNC: 148 MG/DL (ref 70–99)
GLUCOSE BLDC GLUCOMTR-MCNC: 150 MG/DL (ref 70–99)
GLUCOSE BLDC GLUCOMTR-MCNC: 159 MG/DL (ref 70–99)
GLUCOSE BLDC GLUCOMTR-MCNC: 166 MG/DL (ref 70–99)
GLUCOSE BLDC GLUCOMTR-MCNC: 170 MG/DL (ref 70–99)
GLUCOSE BLDC GLUCOMTR-MCNC: 185 MG/DL (ref 70–99)
GLUCOSE BLDC GLUCOMTR-MCNC: 195 MG/DL (ref 70–99)
GLUCOSE BLDC GLUCOMTR-MCNC: 196 MG/DL (ref 70–99)
GLUCOSE BLDC GLUCOMTR-MCNC: 209 MG/DL (ref 70–99)
GLUCOSE BLDC GLUCOMTR-MCNC: 222 MG/DL (ref 70–99)
GLUCOSE BLDC GLUCOMTR-MCNC: 250 MG/DL (ref 70–99)
GLUCOSE BLDC GLUCOMTR-MCNC: 258 MG/DL (ref 70–99)
GLUCOSE BLDC GLUCOMTR-MCNC: 265 MG/DL (ref 70–99)
GLUCOSE BLDC GLUCOMTR-MCNC: 82 MG/DL (ref 70–99)
GLUCOSE BLDC GLUCOMTR-MCNC: 88 MG/DL (ref 70–99)
GLUCOSE BLDC GLUCOMTR-MCNC: 95 MG/DL (ref 70–99)
GLUCOSE SERPL-MCNC: 220 MG/DL (ref 70–99)
GLUCOSE SERPL-MCNC: 253 MG/DL (ref 70–99)
GLUCOSE SERPL-MCNC: 254 MG/DL (ref 70–99)
GLUCOSE SERPL-MCNC: 93 MG/DL (ref 70–99)
GLUCOSE UR STRIP-MCNC: NEGATIVE MG/DL
HCT VFR BLD AUTO: 31.9 % (ref 40–53)
HCT VFR BLD AUTO: 36.5 % (ref 40–53)
HCT VFR BLD AUTO: 43.6 % (ref 40–53)
HCT VFR BLD CALC: 42 %PCV (ref 40–53)
HGB BLD CALC-MCNC: 14.3 G/DL (ref 13.3–17.7)
HGB BLD-MCNC: 10.1 G/DL (ref 13.3–17.7)
HGB BLD-MCNC: 11.8 G/DL (ref 13.3–17.7)
HGB BLD-MCNC: 13.5 G/DL (ref 13.3–17.7)
HGB UR QL STRIP: ABNORMAL
INR PPP: 1.19 (ref 0.86–1.14)
INTERPRETATION ECG - MUSE: NORMAL
INTERPRETATION ECG - MUSE: NORMAL
KETONES UR STRIP-MCNC: NEGATIVE MG/DL
LACTATE BLD-SCNC: 8.1 MMOL/L (ref 0.7–2.1)
LACTATE BLD-SCNC: 8.2 MMOL/L (ref 0.7–2)
LACTATE BLD-SCNC: 9.7 MMOL/L (ref 0.7–2)
LACTATE SERPL-SCNC: 3.9 MMOL/L (ref 0.4–2)
LACTATE SERPL-SCNC: 4.6 MMOL/L (ref 0.4–2)
LEUKOCYTE ESTERASE UR QL STRIP: ABNORMAL
LYMPHOCYTES # BLD AUTO: 0.9 10E9/L (ref 0.8–5.3)
LYMPHOCYTES NFR BLD AUTO: 11 %
Lab: ABNORMAL
Lab: NORMAL
Lab: NORMAL
MAGNESIUM SERPL-MCNC: 1.4 MG/DL (ref 1.6–2.3)
MCH RBC QN AUTO: 29.6 PG (ref 26.5–33)
MCH RBC QN AUTO: 30.6 PG (ref 26.5–33)
MCH RBC QN AUTO: 31.1 PG (ref 26.5–33)
MCHC RBC AUTO-ENTMCNC: 31 G/DL (ref 31.5–36.5)
MCHC RBC AUTO-ENTMCNC: 31.7 G/DL (ref 31.5–36.5)
MCHC RBC AUTO-ENTMCNC: 32.3 G/DL (ref 31.5–36.5)
MCV RBC AUTO: 96 FL (ref 78–100)
MCV RBC AUTO: 96 FL (ref 78–100)
MCV RBC AUTO: 97 FL (ref 78–100)
METAMYELOCYTES # BLD: 0.3 10E9/L
METAMYELOCYTES NFR BLD MANUAL: 3 %
MONOCYTES # BLD AUTO: 0.2 10E9/L (ref 0–1.3)
MONOCYTES NFR BLD AUTO: 2 %
MRSA DNA SPEC QL NAA+PROBE: NEGATIVE
MUCOUS THREADS #/AREA URNS LPF: PRESENT /LPF
MYELOCYTES # BLD: 0.1 10E9/L
MYELOCYTES NFR BLD MANUAL: 1 %
NEUTROPHILS # BLD AUTO: 7 10E9/L (ref 1.6–8.3)
NEUTROPHILS NFR BLD AUTO: 82 %
NITRATE UR QL: NEGATIVE
PCO2 BLDV: 23 MM HG (ref 40–50)
PH BLDV: 7.43 PH (ref 7.32–7.43)
PH UR STRIP: 5 PH (ref 5–7)
PHOSPHATE SERPL-MCNC: 3.6 MG/DL (ref 2.5–4.5)
PLATELET # BLD AUTO: 251 10E9/L (ref 150–450)
PLATELET # BLD AUTO: 317 10E9/L (ref 150–450)
PLATELET # BLD AUTO: 322 10E9/L (ref 150–450)
PLATELET # BLD EST: ABNORMAL 10*3/UL
PO2 BLDV: 40 MM HG (ref 25–47)
POTASSIUM BLD-SCNC: 4.3 MMOL/L (ref 3.4–5.3)
POTASSIUM SERPL-SCNC: 3.8 MMOL/L (ref 3.4–5.3)
POTASSIUM SERPL-SCNC: 3.9 MMOL/L (ref 3.4–5.3)
POTASSIUM SERPL-SCNC: 4.1 MMOL/L (ref 3.4–5.3)
POTASSIUM SERPL-SCNC: 4.3 MMOL/L (ref 3.4–5.3)
PROCALCITONIN SERPL-MCNC: 26.65 NG/ML
PROCALCITONIN SERPL-MCNC: >200 NG/ML
PROT SERPL-MCNC: 7.6 G/DL (ref 6.8–8.8)
RBC # BLD AUTO: 3.3 10E12/L (ref 4.4–5.9)
RBC # BLD AUTO: 3.79 10E12/L (ref 4.4–5.9)
RBC # BLD AUTO: 4.56 10E12/L (ref 4.4–5.9)
RBC #/AREA URNS AUTO: >182 /HPF (ref 0–2)
RBC MORPH BLD: ABNORMAL
SAO2 % BLDV FROM PO2: 78 %
SODIUM BLD-SCNC: 140 MMOL/L (ref 133–144)
SODIUM SERPL-SCNC: 139 MMOL/L (ref 133–144)
SODIUM SERPL-SCNC: 140 MMOL/L (ref 133–144)
SODIUM SERPL-SCNC: 141 MMOL/L (ref 133–144)
SODIUM SERPL-SCNC: 141 MMOL/L (ref 133–144)
SOURCE: ABNORMAL
SP GR UR STRIP: 1.03 (ref 1–1.03)
SPECIMEN SOURCE: ABNORMAL
SPECIMEN SOURCE: NORMAL
SPERM #/AREA URNS HPF: PRESENT /HPF
SQUAMOUS #/AREA URNS AUTO: 1 /HPF (ref 0–1)
UROBILINOGEN UR STRIP-MCNC: 2 MG/DL (ref 0–2)
WBC # BLD AUTO: 35.7 10E9/L (ref 4–11)
WBC # BLD AUTO: 8.5 10E9/L (ref 4–11)
WBC # BLD AUTO: 9.1 10E9/L (ref 4–11)
WBC #/AREA URNS AUTO: >182 /HPF (ref 0–2)
WBC CLUMPS #/AREA URNS HPF: PRESENT /HPF

## 2018-01-01 PROCEDURE — 99232 SBSQ HOSP IP/OBS MODERATE 35: CPT | Performed by: INTERNAL MEDICINE

## 2018-01-01 PROCEDURE — 87493 C DIFF AMPLIFIED PROBE: CPT | Performed by: EMERGENCY MEDICINE

## 2018-01-01 PROCEDURE — 83605 ASSAY OF LACTIC ACID: CPT | Performed by: INTERNAL MEDICINE

## 2018-01-01 PROCEDURE — 99239 HOSP IP/OBS DSCHRG MGMT >30: CPT | Performed by: INTERNAL MEDICINE

## 2018-01-01 PROCEDURE — 40000133 ZZH STATISTIC OT WARD VISIT: Performed by: OCCUPATIONAL THERAPIST

## 2018-01-01 PROCEDURE — 25000132 ZZH RX MED GY IP 250 OP 250 PS 637: Performed by: CLINICAL NURSE SPECIALIST

## 2018-01-01 PROCEDURE — 99233 SBSQ HOSP IP/OBS HIGH 50: CPT | Performed by: INTERNAL MEDICINE

## 2018-01-01 PROCEDURE — 25000125 ZZHC RX 250

## 2018-01-01 PROCEDURE — 25000125 ZZHC RX 250: Performed by: INTERNAL MEDICINE

## 2018-01-01 PROCEDURE — 81001 URINALYSIS AUTO W/SCOPE: CPT | Performed by: EMERGENCY MEDICINE

## 2018-01-01 PROCEDURE — 87641 MR-STAPH DNA AMP PROBE: CPT | Performed by: INTERNAL MEDICINE

## 2018-01-01 PROCEDURE — 40000193 ZZH STATISTIC PT WARD VISIT

## 2018-01-01 PROCEDURE — 36569 INSJ PICC 5 YR+ W/O IMAGING: CPT | Mod: 52

## 2018-01-01 PROCEDURE — 80048 BASIC METABOLIC PNL TOTAL CA: CPT | Performed by: INTERNAL MEDICINE

## 2018-01-01 PROCEDURE — 25000128 H RX IP 250 OP 636: Performed by: INTERNAL MEDICINE

## 2018-01-01 PROCEDURE — 25000132 ZZH RX MED GY IP 250 OP 250 PS 637: Performed by: INTERNAL MEDICINE

## 2018-01-01 PROCEDURE — 40000275 ZZH STATISTIC RCP TIME EA 10 MIN

## 2018-01-01 PROCEDURE — 00000146 ZZHCL STATISTIC GLUCOSE BY METER IP

## 2018-01-01 PROCEDURE — 84145 PROCALCITONIN (PCT): CPT | Performed by: EMERGENCY MEDICINE

## 2018-01-01 PROCEDURE — 97535 SELF CARE MNGMENT TRAINING: CPT | Mod: GO

## 2018-01-01 PROCEDURE — 27210195 ZZH KIT POWER PICC DOUBLE LUMEN

## 2018-01-01 PROCEDURE — 36415 COLL VENOUS BLD VENIPUNCTURE: CPT | Performed by: INTERNAL MEDICINE

## 2018-01-01 PROCEDURE — 93005 ELECTROCARDIOGRAM TRACING: CPT

## 2018-01-01 PROCEDURE — 99233 SBSQ HOSP IP/OBS HIGH 50: CPT | Performed by: CLINICAL NURSE SPECIALIST

## 2018-01-01 PROCEDURE — 97165 OT EVAL LOW COMPLEX 30 MIN: CPT | Mod: GO | Performed by: OCCUPATIONAL THERAPIST

## 2018-01-01 PROCEDURE — 85027 COMPLETE CBC AUTOMATED: CPT | Performed by: INTERNAL MEDICINE

## 2018-01-01 PROCEDURE — 96360 HYDRATION IV INFUSION INIT: CPT | Mod: 59

## 2018-01-01 PROCEDURE — 25000132 ZZH RX MED GY IP 250 OP 250 PS 637

## 2018-01-01 PROCEDURE — 80048 BASIC METABOLIC PNL TOTAL CA: CPT | Performed by: EMERGENCY MEDICINE

## 2018-01-01 PROCEDURE — 87040 BLOOD CULTURE FOR BACTERIA: CPT | Performed by: EMERGENCY MEDICINE

## 2018-01-01 PROCEDURE — 83735 ASSAY OF MAGNESIUM: CPT | Performed by: INTERNAL MEDICINE

## 2018-01-01 PROCEDURE — 87186 SC STD MICRODIL/AGAR DIL: CPT | Performed by: EMERGENCY MEDICINE

## 2018-01-01 PROCEDURE — 20000003 ZZH R&B ICU

## 2018-01-01 PROCEDURE — 85014 HEMATOCRIT: CPT

## 2018-01-01 PROCEDURE — 85730 THROMBOPLASTIN TIME PARTIAL: CPT | Performed by: EMERGENCY MEDICINE

## 2018-01-01 PROCEDURE — 85730 THROMBOPLASTIN TIME PARTIAL: CPT | Performed by: INTERNAL MEDICINE

## 2018-01-01 PROCEDURE — 87077 CULTURE AEROBIC IDENTIFY: CPT | Performed by: EMERGENCY MEDICINE

## 2018-01-01 PROCEDURE — 80047 BASIC METABLC PNL IONIZED CA: CPT

## 2018-01-01 PROCEDURE — 83605 ASSAY OF LACTIC ACID: CPT | Performed by: EMERGENCY MEDICINE

## 2018-01-01 PROCEDURE — 83605 ASSAY OF LACTIC ACID: CPT

## 2018-01-01 PROCEDURE — 99223 1ST HOSP IP/OBS HIGH 75: CPT | Performed by: CLINICAL NURSE SPECIALIST

## 2018-01-01 PROCEDURE — 82803 BLOOD GASES ANY COMBINATION: CPT

## 2018-01-01 PROCEDURE — 99291 CRITICAL CARE FIRST HOUR: CPT | Performed by: SURGERY

## 2018-01-01 PROCEDURE — 25000131 ZZH RX MED GY IP 250 OP 636 PS 637: Performed by: INTERNAL MEDICINE

## 2018-01-01 PROCEDURE — 99238 HOSP IP/OBS DSCHRG MGMT 30/<: CPT | Performed by: INTERNAL MEDICINE

## 2018-01-01 PROCEDURE — 40000281 ZZH STATISTIC TRANSPORT TIME EA 15 MIN

## 2018-01-01 PROCEDURE — 71045 X-RAY EXAM CHEST 1 VIEW: CPT

## 2018-01-01 PROCEDURE — 85610 PROTHROMBIN TIME: CPT | Performed by: EMERGENCY MEDICINE

## 2018-01-01 PROCEDURE — 87088 URINE BACTERIA CULTURE: CPT | Performed by: EMERGENCY MEDICINE

## 2018-01-01 PROCEDURE — 12000000 ZZH R&B MED SURG/OB

## 2018-01-01 PROCEDURE — 40000133 ZZH STATISTIC OT WARD VISIT

## 2018-01-01 PROCEDURE — 94660 CPAP INITIATION&MGMT: CPT

## 2018-01-01 PROCEDURE — 96361 HYDRATE IV INFUSION ADD-ON: CPT

## 2018-01-01 PROCEDURE — 51702 INSERT TEMP BLADDER CATH: CPT

## 2018-01-01 PROCEDURE — 85379 FIBRIN DEGRADATION QUANT: CPT | Performed by: INTERNAL MEDICINE

## 2018-01-01 PROCEDURE — 97530 THERAPEUTIC ACTIVITIES: CPT | Mod: GO

## 2018-01-01 PROCEDURE — 25000125 ZZHC RX 250: Performed by: CLINICAL NURSE SPECIALIST

## 2018-01-01 PROCEDURE — 99356 ZZC PROLONGED SERV,INPATIENT,1ST HR: CPT | Performed by: CLINICAL NURSE SPECIALIST

## 2018-01-01 PROCEDURE — 87640 STAPH A DNA AMP PROBE: CPT | Performed by: INTERNAL MEDICINE

## 2018-01-01 PROCEDURE — 25000128 H RX IP 250 OP 636

## 2018-01-01 PROCEDURE — 12000007 ZZH R&B INTERMEDIATE

## 2018-01-01 PROCEDURE — 87800 DETECT AGNT MULT DNA DIREC: CPT | Performed by: EMERGENCY MEDICINE

## 2018-01-01 PROCEDURE — 85025 COMPLETE CBC W/AUTO DIFF WBC: CPT | Performed by: EMERGENCY MEDICINE

## 2018-01-01 PROCEDURE — 40000257 ZZH STATISTIC CONSULT NO CHARGE VASC ACCESS

## 2018-01-01 PROCEDURE — 97530 THERAPEUTIC ACTIVITIES: CPT | Mod: GP

## 2018-01-01 PROCEDURE — 25000132 ZZH RX MED GY IP 250 OP 250 PS 637: Performed by: EMERGENCY MEDICINE

## 2018-01-01 PROCEDURE — 25000128 H RX IP 250 OP 636: Performed by: EMERGENCY MEDICINE

## 2018-01-01 PROCEDURE — 84100 ASSAY OF PHOSPHORUS: CPT | Performed by: INTERNAL MEDICINE

## 2018-01-01 PROCEDURE — 87086 URINE CULTURE/COLONY COUNT: CPT | Performed by: EMERGENCY MEDICINE

## 2018-01-01 PROCEDURE — 84145 PROCALCITONIN (PCT): CPT | Performed by: INTERNAL MEDICINE

## 2018-01-01 PROCEDURE — 97161 PT EVAL LOW COMPLEX 20 MIN: CPT | Mod: GP

## 2018-01-01 PROCEDURE — 80076 HEPATIC FUNCTION PANEL: CPT | Performed by: EMERGENCY MEDICINE

## 2018-01-01 PROCEDURE — 99358 PROLONG SERVICE W/O CONTACT: CPT | Performed by: CLINICAL NURSE SPECIALIST

## 2018-01-01 PROCEDURE — 96374 THER/PROPH/DIAG INJ IV PUSH: CPT

## 2018-01-01 PROCEDURE — 99285 EMERGENCY DEPT VISIT HI MDM: CPT | Mod: 25

## 2018-01-01 PROCEDURE — 97110 THERAPEUTIC EXERCISES: CPT | Mod: GP

## 2018-01-01 PROCEDURE — 97530 THERAPEUTIC ACTIVITIES: CPT | Mod: GO | Performed by: OCCUPATIONAL THERAPIST

## 2018-01-01 RX ORDER — GLYCOPYRROLATE 0.2 MG/ML
.2-.4 INJECTION, SOLUTION INTRAMUSCULAR; INTRAVENOUS EVERY 4 HOURS PRN
Status: DISCONTINUED | OUTPATIENT
Start: 2018-01-01 | End: 2018-01-01

## 2018-01-01 RX ORDER — ACETAMINOPHEN 650 MG/1
975 SUPPOSITORY RECTAL EVERY 8 HOURS
Status: DISCONTINUED | OUTPATIENT
Start: 2018-01-01 | End: 2018-01-01

## 2018-01-01 RX ORDER — PROCHLORPERAZINE MALEATE 5 MG
5 TABLET ORAL EVERY 6 HOURS PRN
Status: DISCONTINUED | OUTPATIENT
Start: 2018-01-01 | End: 2018-01-01 | Stop reason: HOSPADM

## 2018-01-01 RX ORDER — SODIUM CHLORIDE 9 MG/ML
INJECTION, SOLUTION INTRAVENOUS CONTINUOUS
Status: DISCONTINUED | OUTPATIENT
Start: 2018-01-01 | End: 2018-01-01 | Stop reason: HOSPADM

## 2018-01-01 RX ORDER — ONDANSETRON 2 MG/ML
4 INJECTION INTRAMUSCULAR; INTRAVENOUS EVERY 6 HOURS PRN
Status: DISCONTINUED | OUTPATIENT
Start: 2018-01-01 | End: 2018-01-01

## 2018-01-01 RX ORDER — HYDROMORPHONE HYDROCHLORIDE 1 MG/ML
.3-.5 INJECTION, SOLUTION INTRAMUSCULAR; INTRAVENOUS; SUBCUTANEOUS
Status: DISCONTINUED | OUTPATIENT
Start: 2018-01-01 | End: 2018-01-01

## 2018-01-01 RX ORDER — OSELTAMIVIR PHOSPHATE 75 MG/1
75 CAPSULE ORAL 2 TIMES DAILY
Status: DISCONTINUED | OUTPATIENT
Start: 2018-01-01 | End: 2018-01-01

## 2018-01-01 RX ORDER — BISACODYL 10 MG
SUPPOSITORY, RECTAL RECTAL
Qty: 1 SUPPOSITORY | Refills: 0 | Status: SHIPPED | OUTPATIENT
Start: 2018-01-01

## 2018-01-01 RX ORDER — ACETAMINOPHEN 650 MG/1
650 SUPPOSITORY RECTAL EVERY 6 HOURS
Status: DISCONTINUED | OUTPATIENT
Start: 2018-01-01 | End: 2018-01-01 | Stop reason: HOSPADM

## 2018-01-01 RX ORDER — HYDROMORPHONE HYDROCHLORIDE 1 MG/ML
INJECTION, SOLUTION INTRAMUSCULAR; INTRAVENOUS; SUBCUTANEOUS
Status: COMPLETED
Start: 2018-01-01 | End: 2018-01-01

## 2018-01-01 RX ORDER — GLYCOPYRROLATE 0.2 MG/ML
0.2 INJECTION, SOLUTION INTRAMUSCULAR; INTRAVENOUS ONCE
Status: COMPLETED | OUTPATIENT
Start: 2018-01-01 | End: 2018-01-01

## 2018-01-01 RX ORDER — DEXTROSE MONOHYDRATE 25 G/50ML
25-50 INJECTION, SOLUTION INTRAVENOUS
Status: DISCONTINUED | OUTPATIENT
Start: 2018-01-01 | End: 2018-01-01

## 2018-01-01 RX ORDER — HEPARIN SODIUM,PORCINE 10 UNIT/ML
2-5 VIAL (ML) INTRAVENOUS
Status: DISCONTINUED | OUTPATIENT
Start: 2018-01-01 | End: 2018-01-01 | Stop reason: HOSPADM

## 2018-01-01 RX ORDER — NALOXONE HYDROCHLORIDE 0.4 MG/ML
.1-.4 INJECTION, SOLUTION INTRAMUSCULAR; INTRAVENOUS; SUBCUTANEOUS
Status: DISCONTINUED | OUTPATIENT
Start: 2018-01-01 | End: 2018-01-01

## 2018-01-01 RX ORDER — ACETAMINOPHEN 650 MG/1
650 SUPPOSITORY RECTAL EVERY 6 HOURS
Status: DISCONTINUED | OUTPATIENT
Start: 2018-01-01 | End: 2018-01-01

## 2018-01-01 RX ORDER — HYDROMORPHONE HYDROCHLORIDE 1 MG/ML
.3-.5 INJECTION, SOLUTION INTRAMUSCULAR; INTRAVENOUS; SUBCUTANEOUS EVERY 10 MIN PRN
Status: DISCONTINUED | OUTPATIENT
Start: 2018-01-01 | End: 2018-01-01

## 2018-01-01 RX ORDER — ACETAMINOPHEN 10 MG/ML
1000 INJECTION, SOLUTION INTRAVENOUS ONCE
Status: COMPLETED | OUTPATIENT
Start: 2018-01-01 | End: 2018-01-01

## 2018-01-01 RX ORDER — NICOTINE POLACRILEX 4 MG
15-30 LOZENGE BUCCAL
Status: DISCONTINUED | OUTPATIENT
Start: 2018-01-01 | End: 2018-01-01 | Stop reason: HOSPADM

## 2018-01-01 RX ORDER — NICOTINE POLACRILEX 4 MG
15-30 LOZENGE BUCCAL
Status: DISCONTINUED | OUTPATIENT
Start: 2018-01-01 | End: 2018-01-01

## 2018-01-01 RX ORDER — NOREPINEPHRINE BITARTRATE/D5W 16MG/250ML
0.03-0.4 PLASTIC BAG, INJECTION (ML) INTRAVENOUS CONTINUOUS
Status: DISCONTINUED | OUTPATIENT
Start: 2018-01-01 | End: 2018-01-01

## 2018-01-01 RX ORDER — ONDANSETRON 4 MG/1
4 TABLET, ORALLY DISINTEGRATING ORAL EVERY 6 HOURS PRN
Status: DISCONTINUED | OUTPATIENT
Start: 2018-01-01 | End: 2018-01-01 | Stop reason: HOSPADM

## 2018-01-01 RX ORDER — ATROPINE SULFATE 10 MG/ML
1-2 SOLUTION/ DROPS OPHTHALMIC
Status: DISCONTINUED | OUTPATIENT
Start: 2018-01-01 | End: 2018-01-01 | Stop reason: HOSPADM

## 2018-01-01 RX ORDER — MAGNESIUM SULFATE 1 G/100ML
1 INJECTION INTRAVENOUS ONCE
Status: COMPLETED | OUTPATIENT
Start: 2018-01-01 | End: 2018-01-01

## 2018-01-01 RX ORDER — ONDANSETRON 2 MG/ML
4 INJECTION INTRAMUSCULAR; INTRAVENOUS EVERY 6 HOURS PRN
Status: DISCONTINUED | OUTPATIENT
Start: 2018-01-01 | End: 2018-01-01 | Stop reason: HOSPADM

## 2018-01-01 RX ORDER — ATROPINE SULFATE 10 MG/ML
1-2 SOLUTION/ DROPS OPHTHALMIC
Qty: 1 BOTTLE | Refills: 0 | Status: SHIPPED | OUTPATIENT
Start: 2018-01-01

## 2018-01-01 RX ORDER — ATROPINE SULFATE 10 MG/ML
1-2 SOLUTION/ DROPS OPHTHALMIC
Status: DISCONTINUED | OUTPATIENT
Start: 2018-01-01 | End: 2018-01-01 | Stop reason: CLARIF

## 2018-01-01 RX ORDER — PYRIDOSTIGMINE BROMIDE 180 MG/1
180 TABLET, EXTENDED RELEASE ORAL 2 TIMES DAILY
Status: DISCONTINUED | OUTPATIENT
Start: 2018-01-01 | End: 2018-01-01

## 2018-01-01 RX ORDER — HYDROMORPHONE HYDROCHLORIDE 10 MG/ML
1-2 INJECTION INTRAMUSCULAR; INTRAVENOUS; SUBCUTANEOUS
Status: DISCONTINUED | OUTPATIENT
Start: 2018-01-01 | End: 2018-01-01

## 2018-01-01 RX ORDER — PYRIDOSTIGMINE BROMIDE 60 MG/1
60 TABLET ORAL
Status: DISCONTINUED | OUTPATIENT
Start: 2018-01-01 | End: 2018-01-01

## 2018-01-01 RX ORDER — NALOXONE HYDROCHLORIDE 0.4 MG/ML
.1-.4 INJECTION, SOLUTION INTRAMUSCULAR; INTRAVENOUS; SUBCUTANEOUS
Status: DISCONTINUED | OUTPATIENT
Start: 2018-01-01 | End: 2018-01-01 | Stop reason: HOSPADM

## 2018-01-01 RX ORDER — PREDNISONE 10 MG/1
10 TABLET ORAL DAILY
Status: DISCONTINUED | OUTPATIENT
Start: 2018-01-01 | End: 2018-01-01 | Stop reason: HOSPADM

## 2018-01-01 RX ORDER — HYDROMORPHONE HYDROCHLORIDE 1 MG/ML
.3-.5 INJECTION, SOLUTION INTRAMUSCULAR; INTRAVENOUS; SUBCUTANEOUS EVERY 30 MIN PRN
Status: DISCONTINUED | OUTPATIENT
Start: 2018-01-01 | End: 2018-01-01

## 2018-01-01 RX ORDER — CLOPIDOGREL BISULFATE 75 MG/1
75 TABLET ORAL DAILY
Status: DISCONTINUED | OUTPATIENT
Start: 2018-01-01 | End: 2018-01-01

## 2018-01-01 RX ORDER — LORAZEPAM 2 MG/ML
1 CONCENTRATE ORAL EVERY 4 HOURS
Qty: 15 ML | Refills: 0 | Status: SHIPPED | OUTPATIENT
Start: 2018-01-01

## 2018-01-01 RX ORDER — ACETAMINOPHEN 650 MG/1
650 SUPPOSITORY RECTAL ONCE
Status: COMPLETED | OUTPATIENT
Start: 2018-01-01 | End: 2018-01-01

## 2018-01-01 RX ORDER — HALOPERIDOL 2 MG/ML
.5-1 SOLUTION ORAL EVERY 6 HOURS PRN
Qty: 30 ML | Refills: 0 | Status: SHIPPED | OUTPATIENT
Start: 2018-01-01

## 2018-01-01 RX ORDER — SODIUM CHLORIDE, SODIUM LACTATE, POTASSIUM CHLORIDE, CALCIUM CHLORIDE 600; 310; 30; 20 MG/100ML; MG/100ML; MG/100ML; MG/100ML
INJECTION, SOLUTION INTRAVENOUS CONTINUOUS
Status: DISCONTINUED | OUTPATIENT
Start: 2018-01-01 | End: 2018-01-01

## 2018-01-01 RX ORDER — ONDANSETRON 4 MG/1
4 TABLET, ORALLY DISINTEGRATING ORAL EVERY 6 HOURS PRN
Status: DISCONTINUED | OUTPATIENT
Start: 2018-01-01 | End: 2018-01-01

## 2018-01-01 RX ORDER — METHYLPREDNISOLONE SODIUM SUCCINATE 40 MG/ML
10 INJECTION, POWDER, LYOPHILIZED, FOR SOLUTION INTRAMUSCULAR; INTRAVENOUS EVERY 24 HOURS
Status: DISCONTINUED | OUTPATIENT
Start: 2018-01-01 | End: 2018-01-01

## 2018-01-01 RX ORDER — MEROPENEM 1 G/1
1 INJECTION, POWDER, FOR SOLUTION INTRAVENOUS EVERY 12 HOURS
Status: DISCONTINUED | OUTPATIENT
Start: 2018-01-01 | End: 2018-01-01

## 2018-01-01 RX ORDER — HYDROMORPHONE HYDROCHLORIDE 1 MG/ML
2 SOLUTION ORAL EVERY 4 HOURS
Qty: 30 ML | Refills: 0 | Status: SHIPPED | OUTPATIENT
Start: 2018-01-01

## 2018-01-01 RX ORDER — ACETAMINOPHEN 650 MG/1
650 SUPPOSITORY RECTAL EVERY 6 HOURS
Qty: 12 SUPPOSITORY | Refills: 0 | Status: SHIPPED | OUTPATIENT
Start: 2018-01-01

## 2018-01-01 RX ORDER — DEXTROSE MONOHYDRATE 25 G/50ML
25-50 INJECTION, SOLUTION INTRAVENOUS
Status: DISCONTINUED | OUTPATIENT
Start: 2018-01-01 | End: 2018-01-01 | Stop reason: HOSPADM

## 2018-01-01 RX ORDER — LORAZEPAM 2 MG/ML
0.5 INJECTION INTRAMUSCULAR EVERY 4 HOURS PRN
Status: DISCONTINUED | OUTPATIENT
Start: 2018-01-01 | End: 2018-01-01

## 2018-01-01 RX ORDER — HYDROMORPHONE HYDROCHLORIDE 10 MG/ML
2 INJECTION INTRAMUSCULAR; INTRAVENOUS; SUBCUTANEOUS
Status: DISCONTINUED | OUTPATIENT
Start: 2018-01-01 | End: 2018-01-01 | Stop reason: HOSPADM

## 2018-01-01 RX ORDER — OSELTAMIVIR PHOSPHATE 6 MG/ML
30 FOR SUSPENSION ORAL ONCE
Status: COMPLETED | OUTPATIENT
Start: 2018-01-01 | End: 2018-01-01

## 2018-01-01 RX ORDER — MEROPENEM 1 G/1
1 INJECTION, POWDER, FOR SOLUTION INTRAVENOUS ONCE
Status: COMPLETED | OUTPATIENT
Start: 2018-01-01 | End: 2018-01-01

## 2018-01-01 RX ORDER — PROCHLORPERAZINE 25 MG
12.5 SUPPOSITORY, RECTAL RECTAL EVERY 12 HOURS PRN
Status: DISCONTINUED | OUTPATIENT
Start: 2018-01-01 | End: 2018-01-01 | Stop reason: HOSPADM

## 2018-01-01 RX ORDER — GLYCOPYRROLATE 0.2 MG/ML
.2-.4 INJECTION, SOLUTION INTRAMUSCULAR; INTRAVENOUS EVERY 4 HOURS
Status: DISCONTINUED | OUTPATIENT
Start: 2018-01-01 | End: 2018-01-01 | Stop reason: HOSPADM

## 2018-01-01 RX ORDER — MEPERIDINE HYDROCHLORIDE 25 MG/ML
25-50 INJECTION INTRAMUSCULAR; INTRAVENOUS; SUBCUTANEOUS
Status: DISCONTINUED | OUTPATIENT
Start: 2018-01-01 | End: 2018-01-01

## 2018-01-01 RX ORDER — HYDROMORPHONE HYDROCHLORIDE 10 MG/ML
2 INJECTION INTRAMUSCULAR; INTRAVENOUS; SUBCUTANEOUS EVERY 4 HOURS
Status: DISCONTINUED | OUTPATIENT
Start: 2018-01-01 | End: 2018-01-01 | Stop reason: HOSPADM

## 2018-01-01 RX ORDER — ACETAMINOPHEN 650 MG/1
650 SUPPOSITORY RECTAL EVERY 4 HOURS PRN
Status: DISCONTINUED | OUTPATIENT
Start: 2018-01-01 | End: 2018-01-01

## 2018-01-01 RX ORDER — ACETAMINOPHEN 325 MG/1
650 TABLET ORAL EVERY 4 HOURS PRN
Status: DISCONTINUED | OUTPATIENT
Start: 2018-01-01 | End: 2018-01-01

## 2018-01-01 RX ORDER — HEPARIN SODIUM 5000 [USP'U]/.5ML
5000 INJECTION, SOLUTION INTRAVENOUS; SUBCUTANEOUS EVERY 12 HOURS
Status: DISCONTINUED | OUTPATIENT
Start: 2018-01-01 | End: 2018-01-01

## 2018-01-01 RX ADMIN — CIPROFLOXACIN HYDROCHLORIDE 250 MG: 250 TABLET, FILM COATED ORAL at 08:29

## 2018-01-01 RX ADMIN — SODIUM CHLORIDE 1.5 UNITS/HR: 9 INJECTION, SOLUTION INTRAVENOUS at 08:53

## 2018-01-01 RX ADMIN — GLYCOPYRROLATE 0.4 MG: 0.4 INJECTION INTRAMUSCULAR; INTRAVENOUS at 20:26

## 2018-01-01 RX ADMIN — HYDROMORPHONE HYDROCHLORIDE 2 MG: 10 INJECTION, SOLUTION INTRAMUSCULAR; INTRAVENOUS; SUBCUTANEOUS at 02:45

## 2018-01-01 RX ADMIN — MEROPENEM 1 G: 1 INJECTION, POWDER, FOR SOLUTION INTRAVENOUS at 19:10

## 2018-01-01 RX ADMIN — OSELTAMIVIR PHOSPHATE 30 MG: 30 CAPSULE ORAL at 09:47

## 2018-01-01 RX ADMIN — SODIUM CHLORIDE, POTASSIUM CHLORIDE, SODIUM LACTATE AND CALCIUM CHLORIDE 1000 ML: 600; 310; 30; 20 INJECTION, SOLUTION INTRAVENOUS at 21:18

## 2018-01-01 RX ADMIN — Medication 0.5 MG: at 11:15

## 2018-01-01 RX ADMIN — OSELTAMIVIR PHOSPHATE 75 MG: 75 CAPSULE ORAL at 20:52

## 2018-01-01 RX ADMIN — OSELTAMIVIR PHOSPHATE 75 MG: 75 CAPSULE ORAL at 09:34

## 2018-01-01 RX ADMIN — GLYCOPYRROLATE 0.2 MG: 0.4 INJECTION INTRAMUSCULAR; INTRAVENOUS at 01:14

## 2018-01-01 RX ADMIN — INSULIN GLARGINE 8 UNITS: 100 INJECTION, SOLUTION SUBCUTANEOUS at 08:29

## 2018-01-01 RX ADMIN — INSULIN ASPART 1 UNITS: 100 INJECTION, SOLUTION INTRAVENOUS; SUBCUTANEOUS at 12:18

## 2018-01-01 RX ADMIN — Medication 1 MG: at 12:11

## 2018-01-01 RX ADMIN — SODIUM CHLORIDE 2000 ML: 9 INJECTION, SOLUTION INTRAVENOUS at 18:15

## 2018-01-01 RX ADMIN — INSULIN ASPART 3 UNITS: 100 INJECTION, SOLUTION INTRAVENOUS; SUBCUTANEOUS at 17:39

## 2018-01-01 RX ADMIN — MIDAZOLAM HYDROCHLORIDE 2 MG: 1 INJECTION, SOLUTION INTRAMUSCULAR; INTRAVENOUS at 11:46

## 2018-01-01 RX ADMIN — CIPROFLOXACIN HYDROCHLORIDE 250 MG: 250 TABLET, FILM COATED ORAL at 09:42

## 2018-01-01 RX ADMIN — PYRIDOSTIGMINE BROMIDE 180 MG: 180 TABLET, EXTENDED RELEASE ORAL at 09:42

## 2018-01-01 RX ADMIN — ACETAMINOPHEN 650 MG: 650 SUPPOSITORY RECTAL at 13:29

## 2018-01-01 RX ADMIN — INSULIN ASPART 3 UNITS: 100 INJECTION, SOLUTION INTRAVENOUS; SUBCUTANEOUS at 00:03

## 2018-01-01 RX ADMIN — ACETAMINOPHEN 650 MG: 650 SUPPOSITORY RECTAL at 13:05

## 2018-01-01 RX ADMIN — GLYCOPYRROLATE 0.2 MG: 0.2 INJECTION INTRAMUSCULAR; INTRAVENOUS at 11:25

## 2018-01-01 RX ADMIN — GLYCOPYRROLATE 0.4 MG: 0.4 INJECTION INTRAMUSCULAR; INTRAVENOUS at 12:58

## 2018-01-01 RX ADMIN — HYDROMORPHONE HYDROCHLORIDE 2 MG: 10 INJECTION, SOLUTION INTRAMUSCULAR; INTRAVENOUS; SUBCUTANEOUS at 18:29

## 2018-01-01 RX ADMIN — INSULIN ASPART 1 UNITS: 100 INJECTION, SOLUTION INTRAVENOUS; SUBCUTANEOUS at 12:36

## 2018-01-01 RX ADMIN — Medication 1 MG: at 14:22

## 2018-01-01 RX ADMIN — SODIUM CHLORIDE: 9 INJECTION, SOLUTION INTRAVENOUS at 18:29

## 2018-01-01 RX ADMIN — MIDAZOLAM HYDROCHLORIDE 2 MG: 1 INJECTION, SOLUTION INTRAMUSCULAR; INTRAVENOUS at 20:29

## 2018-01-01 RX ADMIN — ACETAMINOPHEN 650 MG: 650 SUPPOSITORY RECTAL at 21:01

## 2018-01-01 RX ADMIN — MIDAZOLAM HYDROCHLORIDE 1 MG: 1 INJECTION, SOLUTION INTRAMUSCULAR; INTRAVENOUS at 11:20

## 2018-01-01 RX ADMIN — PREDNISONE 15 MG: 10 TABLET ORAL at 09:41

## 2018-01-01 RX ADMIN — MIDAZOLAM HYDROCHLORIDE 2 MG: 1 INJECTION, SOLUTION INTRAMUSCULAR; INTRAVENOUS at 15:26

## 2018-01-01 RX ADMIN — PREDNISONE 10 MG: 10 TABLET ORAL at 08:29

## 2018-01-01 RX ADMIN — HYDROMORPHONE HYDROCHLORIDE 2 MG: 10 INJECTION, SOLUTION INTRAMUSCULAR; INTRAVENOUS; SUBCUTANEOUS at 10:27

## 2018-01-01 RX ADMIN — Medication 0.5 MG: at 11:35

## 2018-01-01 RX ADMIN — INSULIN ASPART 4 UNITS: 100 INJECTION, SOLUTION INTRAVENOUS; SUBCUTANEOUS at 04:45

## 2018-01-01 RX ADMIN — INSULIN ASPART 2 UNITS: 100 INJECTION, SOLUTION INTRAVENOUS; SUBCUTANEOUS at 17:16

## 2018-01-01 RX ADMIN — SODIUM CHLORIDE, POTASSIUM CHLORIDE, SODIUM LACTATE AND CALCIUM CHLORIDE: 600; 310; 30; 20 INJECTION, SOLUTION INTRAVENOUS at 21:01

## 2018-01-01 RX ADMIN — HYDROMORPHONE HYDROCHLORIDE 1 MG: 10 INJECTION, SOLUTION INTRAMUSCULAR; INTRAVENOUS; SUBCUTANEOUS at 16:12

## 2018-01-01 RX ADMIN — INSULIN GLARGINE 8 UNITS: 100 INJECTION, SOLUTION SUBCUTANEOUS at 09:50

## 2018-01-01 RX ADMIN — Medication 0.03 MCG/KG/MIN: at 04:46

## 2018-01-01 RX ADMIN — LIDOCAINE HYDROCHLORIDE: 20 JELLY TOPICAL at 19:05

## 2018-01-01 RX ADMIN — GLYCOPYRROLATE 0.4 MG: 0.4 INJECTION INTRAMUSCULAR; INTRAVENOUS at 16:12

## 2018-01-01 RX ADMIN — PYRIDOSTIGMINE BROMIDE 180 MG: 180 TABLET, EXTENDED RELEASE ORAL at 08:29

## 2018-01-01 RX ADMIN — HYDROMORPHONE HYDROCHLORIDE 2 MG: 10 INJECTION, SOLUTION INTRAMUSCULAR; INTRAVENOUS; SUBCUTANEOUS at 13:38

## 2018-01-01 RX ADMIN — MAGNESIUM SULFATE IN DEXTROSE 1 G: 10 INJECTION, SOLUTION INTRAVENOUS at 07:50

## 2018-01-01 RX ADMIN — SODIUM CHLORIDE: 9 INJECTION, SOLUTION INTRAVENOUS at 08:55

## 2018-01-01 RX ADMIN — HYDROMORPHONE HYDROCHLORIDE 2 MG: 10 INJECTION, SOLUTION INTRAMUSCULAR; INTRAVENOUS; SUBCUTANEOUS at 05:23

## 2018-01-01 RX ADMIN — METHYLPREDNISOLONE SODIUM SUCCINATE 10 MG: 40 INJECTION, POWDER, FOR SOLUTION INTRAMUSCULAR; INTRAVENOUS at 00:26

## 2018-01-01 RX ADMIN — HYDROMORPHONE HYDROCHLORIDE 2 MG: 10 INJECTION, SOLUTION INTRAMUSCULAR; INTRAVENOUS; SUBCUTANEOUS at 08:14

## 2018-01-01 RX ADMIN — INSULIN GLARGINE 8 UNITS: 100 INJECTION, SOLUTION SUBCUTANEOUS at 09:42

## 2018-01-01 RX ADMIN — GLYCOPYRROLATE 0.2 MG: 0.4 INJECTION INTRAMUSCULAR; INTRAVENOUS at 05:24

## 2018-01-01 RX ADMIN — PYRIDOSTIGMINE BROMIDE 180 MG: 180 TABLET, EXTENDED RELEASE ORAL at 01:33

## 2018-01-01 RX ADMIN — METHYLPREDNISOLONE SODIUM SUCCINATE 10 MG: 40 INJECTION, POWDER, FOR SOLUTION INTRAMUSCULAR; INTRAVENOUS at 01:13

## 2018-01-01 RX ADMIN — ATROPINE SULFATE 2 DROP: 10 SOLUTION/ DROPS OPHTHALMIC at 08:31

## 2018-01-01 RX ADMIN — GLYCOPYRROLATE 0.4 MG: 0.4 INJECTION INTRAMUSCULAR; INTRAVENOUS at 12:24

## 2018-01-01 RX ADMIN — CIPROFLOXACIN HYDROCHLORIDE 250 MG: 250 TABLET, FILM COATED ORAL at 22:43

## 2018-01-01 RX ADMIN — GLYCOPYRROLATE 0.2 MG: 0.4 INJECTION INTRAMUSCULAR; INTRAVENOUS at 09:12

## 2018-01-01 RX ADMIN — MEROPENEM 1 G: 1 INJECTION, POWDER, FOR SOLUTION INTRAVENOUS at 08:25

## 2018-01-01 RX ADMIN — OSELTAMIVIR PHOSPHATE 30 MG: 6 POWDER, FOR SUSPENSION ORAL at 00:06

## 2018-01-01 RX ADMIN — Medication 0.5 MG: at 15:26

## 2018-01-01 RX ADMIN — ACETAMINOPHEN 650 MG: 650 SUPPOSITORY RECTAL at 01:14

## 2018-01-01 RX ADMIN — INSULIN ASPART 5 UNITS: 100 INJECTION, SOLUTION INTRAVENOUS; SUBCUTANEOUS at 01:48

## 2018-01-01 RX ADMIN — SODIUM CHLORIDE, POTASSIUM CHLORIDE, SODIUM LACTATE AND CALCIUM CHLORIDE: 600; 310; 30; 20 INJECTION, SOLUTION INTRAVENOUS at 06:08

## 2018-01-01 RX ADMIN — ENOXAPARIN SODIUM 40 MG: 40 INJECTION SUBCUTANEOUS at 09:36

## 2018-01-01 RX ADMIN — INSULIN ASPART 1 UNITS: 100 INJECTION, SOLUTION INTRAVENOUS; SUBCUTANEOUS at 12:51

## 2018-01-01 RX ADMIN — PYRIDOSTIGMINE BROMIDE 180 MG: 180 TABLET, EXTENDED RELEASE ORAL at 22:43

## 2018-01-01 RX ADMIN — ENOXAPARIN SODIUM 40 MG: 40 INJECTION SUBCUTANEOUS at 09:42

## 2018-01-01 RX ADMIN — SODIUM CHLORIDE, POTASSIUM CHLORIDE, SODIUM LACTATE AND CALCIUM CHLORIDE 2000 ML: 600; 310; 30; 20 INJECTION, SOLUTION INTRAVENOUS at 19:00

## 2018-01-01 RX ADMIN — PYRIDOSTIGMINE BROMIDE 180 MG: 180 TABLET, EXTENDED RELEASE ORAL at 20:52

## 2018-01-01 RX ADMIN — MIDAZOLAM HYDROCHLORIDE 2 MG: 1 INJECTION, SOLUTION INTRAMUSCULAR; INTRAVENOUS at 15:38

## 2018-01-01 RX ADMIN — ACETAMINOPHEN 650 MG: 650 SUPPOSITORY RECTAL at 08:14

## 2018-01-01 RX ADMIN — Medication 0.5 MG: at 20:29

## 2018-01-01 RX ADMIN — LIDOCAINE HYDROCHLORIDE 1 ML: 10 INJECTION, SOLUTION INFILTRATION; PERINEURAL at 23:35

## 2018-01-01 RX ADMIN — OSELTAMIVIR PHOSPHATE 75 MG: 75 CAPSULE ORAL at 10:22

## 2018-01-01 RX ADMIN — PYRIDOSTIGMINE BROMIDE 180 MG: 180 TABLET, EXTENDED RELEASE ORAL at 09:49

## 2018-01-01 RX ADMIN — ACETAMINOPHEN 650 MG: 650 SUPPOSITORY RECTAL at 18:29

## 2018-01-01 RX ADMIN — Medication 1 MG: at 03:35

## 2018-01-01 RX ADMIN — ENOXAPARIN SODIUM 40 MG: 40 INJECTION SUBCUTANEOUS at 09:49

## 2018-01-01 RX ADMIN — ACETAMINOPHEN 1000 MG: 10 INJECTION, SOLUTION INTRAVENOUS at 08:34

## 2018-01-01 RX ADMIN — HYDROMORPHONE HYDROCHLORIDE 1 MG: 10 INJECTION, SOLUTION INTRAMUSCULAR; INTRAVENOUS; SUBCUTANEOUS at 12:58

## 2018-01-01 RX ADMIN — Medication 1 MG: at 07:46

## 2018-01-01 RX ADMIN — PREDNISONE 10 MG: 10 TABLET ORAL at 09:49

## 2018-01-01 ASSESSMENT — ACTIVITIES OF DAILY LIVING (ADL)
ADLS_ACUITY_SCORE: 23
ADLS_ACUITY_SCORE: 21
ADLS_ACUITY_SCORE: 22
ADLS_ACUITY_SCORE: 17
ADLS_ACUITY_SCORE: 22
ADLS_ACUITY_SCORE: 22
ADLS_ACUITY_SCORE: 17
ADLS_ACUITY_SCORE: 22
ADLS_ACUITY_SCORE: 23
ADLS_ACUITY_SCORE: 22
ADLS_ACUITY_SCORE: 23
ADLS_ACUITY_SCORE: 17
ADLS_ACUITY_SCORE: 22
ADLS_ACUITY_SCORE: 23
ADLS_ACUITY_SCORE: 25
ADLS_ACUITY_SCORE: 22
ADLS_ACUITY_SCORE: 22
ADLS_ACUITY_SCORE: 21
ADLS_ACUITY_SCORE: 23
ADLS_ACUITY_SCORE: 17
ADLS_ACUITY_SCORE: 22
ADLS_ACUITY_SCORE: 21
ADLS_ACUITY_SCORE: 21
ADLS_ACUITY_SCORE: 22
ADLS_ACUITY_SCORE: 23
ADLS_ACUITY_SCORE: 22

## 2018-01-01 NOTE — PROGRESS NOTES
Doing well with prednisone decreased to 10 mg po and transfer to floor.  No fatigable weakness, speech is at baseline with dysarthria or midline weakness.  Continue prednisone 10 mg/d for 5 days and d/c  Follow up with neurology as outpatient.  Nothing further    35 minutes on floor

## 2018-01-01 NOTE — PROGRESS NOTES
[facility]    Hospitalist Progress Note  Meenakshi Spain MD  Date of Service (when I saw the patient): 01/01/2018    Assessment & Plan   Mr. Clemente is a challenging 86-year-old gentleman with a history of myasthenia gravis, who came to attention tonight with apparent cough and fever,  positive for influenza A.           DIAGNOSES:   1.  Influenza A with acuted respiratory failure:  Off BIPAP > 48 hours and doing much better.  A little confused at times, unclear baseline.     Continue oxygen and wean as tolerated.     Nebs    Already on prednisone 10mg po daily for MG    Tamiflu 75 mg p.o. B.i.d.    2.  Myasthenia gravis, with history of significant weakness.  does not appear to be in crisis.    Transition prednisone to 10mg daily per neuro recs    Appreciate neurology input    May d/c NIF checks as pt unable to participate effectively    Continue with pyridostigmine twice daily.       3.  DM 2    Medium ISS    Cont lantus 8 units q am    4.  Urinary tract infection, E. coli    Cont oral Cipro (avoid PCN and Ceph with allergy)      5.  Acute encephalopathy:  Suspect related to influenza and urinary tract infection; appears to be improving    Monitor, may have some mild underlying confusion      DVT Prophylaxis: Enoxaparin (Lovenox) SQ  Code Status: Full Code    Disposition:  1-2 days depending on progress with PT/OT.      Time spent >35 minutes    Meenakshi Spain MD, MD      Interval History   No c/o. Denies dyspnea. Occasional non-productive cough    -Data reviewed today: I reviewed all new labs and imaging results over the last 24 hours. I personally reviewed no images or EKG's today.    Physical Exam   Temp: 97  F (36.1  C) Temp src: Oral BP: 166/75 Pulse: 64 Heart Rate: 74 Resp: 18 SpO2: 96 % O2 Device: None (Room air)    Vitals:    12/30/17 0600 12/31/17 0400 01/01/18 0443   Weight: 83.6 kg (184 lb 4.9 oz) 82.9 kg (182 lb 12.2 oz) 84.1 kg (185 lb 4.8 oz)     Vital Signs with Ranges  Temp:  [97  F (36.1  C)-98.6   F (37  C)] 97  F (36.1  C)  Pulse:  [64] 64  Heart Rate:  [62-88] 74  Resp:  [18-20] 18  BP: (142-171)/(61-75) 166/75  SpO2:  [92 %-96 %] 96 %  I/O last 3 completed shifts:  In: 3 [I.V.:3]  Out: -     Constitutional: Awake, alert, cooperative, no apparent distress, sitting in chair   Respiratory: Clear to auscultation bilaterally but very diminished breath sounds, no crackles or wheezing   Cardiovascular: Regular rate and rhythm, normal S1 and S2, and no murmur noted       Skin: No rashes, no cyanosis, dry to touch   Neuro: Alert and oriented x2, no weakness, numbness   Extremities: No edema, normal range of motion   Other(s): More alert and talkative.  Walking in hallway with walker this am.         All other systems: Negative       Medications        insulin aspart  1-7 Units Subcutaneous TID AC     insulin aspart  1-5 Units Subcutaneous At Bedtime     [START ON 1/2/2018] predniSONE  10 mg Oral Daily     sodium chloride (PF)  3 mL Intracatheter Q8H     insulin glargine  8 Units Subcutaneous Daily     ciprofloxacin  250 mg Oral BID     oseltamivir  30 mg Oral BID     enoxaparin  40 mg Subcutaneous Q24H     pyridostigmine  180 mg Oral BID       Data     Recent Labs  Lab 01/01/18  0724 12/29/17  0528 12/29/17  0446   WBC 9.1  --  13.7*   HGB 11.8*  --  12.8*   MCV 96  --  96     --  382   INR  --  1.06  --      --  137   POTASSIUM 4.1  --  4.5   CHLORIDE 108  --  104   CO2 28  --  29   BUN 19  --  35*   CR 0.95  --  1.17   ANIONGAP 4  --  4   DALTON 8.4*  --  8.5   GLC 93  --  217*   ALBUMIN  --   --  2.9*   PROTTOTAL  --   --  6.9   BILITOTAL  --   --  0.4   ALKPHOS  --   --  78   ALT  --   --  14   AST  --   --  14   TROPI  --   --  0.037       Imaging:  No results found for this or any previous visit (from the past 24 hour(s)).

## 2018-01-01 NOTE — PLAN OF CARE
Pt. Alert w/ intermittent confusion. VSS, afebrile. , 166, both w/ insulin coverage. Transfers with SBA and walker. Incontinent. LS dim, productive, congested cough. Receiving Tamiflu and scheduled prednisone. Plan for possible D/C 1-2 days.

## 2018-01-01 NOTE — PLAN OF CARE
Problem: Urinary Tract Infection (Adult)  Goal: Signs and Symptoms of Listed Potential Problems Will be Absent, Minimized or Managed (Urinary Tract Infection)  Signs and symptoms of listed potential problems will be absent, minimized or managed by discharge/transition of care (reference Urinary Tract Infection (Adult) CPG).   Outcome: Improving  VS /62 (BP Location: Right arm)  Pulse 64  Temp 98.6  F (37  C) (Oral)  Resp 18  Ht 1.829 m (6')  Wt 84.1 kg (185 lb 4.8 oz)  SpO2 95%  BMI 25.13 kg/m2  Lung sounds diminshed  O2 room air  Bowel sounds active, soft BM this shift  Tolerating diabetic diet, blood sugars monitored and covered appropriately  IVF saline locked  CMS VPM discontinued this shift, pt forgetful   Activity up with stand by assist with walker  Pain denies need for pain medication  Patient/family centered care attempted to call patient's wife without any answer  [facility]

## 2018-01-01 NOTE — PROGRESS NOTES
"Formerly [facility] RCAT     Date: 12/31/17    Admission Dx: Influenza A+    Pulmonary History: former smoker    Home Nebulizer/MDI Use: none on file    Home Oxygen: none on file    Acuity Level (RCAT flow sheet): Level 4    Aerosol Therapy initiated: Duoneb Q6prn and Albuterol Q2prn.    Volume Expansion initiated: IS for diminished breath sound.    Current Oxygen Requirements: Room Air    Current SpO2: 95%    Re-evaluation date: 1/3/17    Patient Education: Discuss with patient the indication, benefit and side effect of nebulizer.    See \"RT Assessments\" flow sheet for patient assessment scoring and Acuity Level Details.             "

## 2018-01-02 NOTE — PROGRESS NOTES
01/02/18 1300   Quick Adds   Type of Visit Initial PT Evaluation   Living Environment   Lives With facility resident   Living Arrangements assisted living  ([facility])   Living Environment Comment Spoke with facility Александр. They report pt uses a wheelchair for mobility, but is able to transfer into it on his own with use of a FWW by himself. Per [facility], pt is able to manage on his own in the bathroom and is typically continent.    Self-Care   Usual Activity Tolerance fair   Current Activity Tolerance fair   Equipment Currently Used at Home (Facility reports wheelchair and FWW)   Functional Level Prior   Ambulation 1-->assistive equipment   Transferring 1-->assistive equipment   Fall history within last six months yes   General Information   Onset of Illness/Injury or Date of Surgery - Date 01/02/18   Referring Physician Her, MD   Patient/Family Goals Statement Return home   Pertinent History of Current Problem (include personal factors and/or comorbidities that impact the POC) 86 year old male admitted with weakness, tested positive for influenza A. Per H&P, patient is mildly compromised cognitively at baseline.    Precautions/Limitations fall precautions   Cognitive Status Examination   Orientation person;place   Level of Consciousness alert;confused   Follows Commands and Answers Questions able to follow single-step instructions   Personal Safety and Judgment impaired   Pain Assessment   Patient Currently in Pain No   Range of Motion (ROM)   ROM Comment UE and LE AROM WFLs   Strength   Strength Comments Decreased generally. Needs multiple attempts to stand from chair   Bed Mobility   Bed Mobility Comments Sit to supine with min A   Transfer Skills   Transfer Comments Sit to/from stand with CGA/min A   Gait   Gait Comments Ambulates 5' with FWW and CGA   Balance   Balance Comments Requires bilateral UE support on FWW   General Therapy Interventions   Planned Therapy Interventions bed mobility  "training;gait training;strengthening;transfer training;progressive activity/exercise   Clinical Impression   Criteria for Skilled Therapeutic Intervention yes, treatment indicated   PT Diagnosis Generalized weakness   Influenced by the following impairments LE weakness, impaired balance, impaired cognition   Functional limitations due to impairments Decreased IND with mobility   Clinical Presentation Stable/Uncomplicated   Clinical Presentation Rationale Medically stable   Clinical Decision Making (Complexity) Low complexity   Therapy Frequency` daily   Predicted Duration of Therapy Intervention (days/wks) One week   Anticipated Discharge Disposition Long Term Care Facility  (Possible HHPT)   Risk & Benefits of therapy have been explained Yes   Patient, Family & other staff in agreement with plan of care Yes   [facility] TM \"6 Clicks\"   2016, Trustees of [facility], under license to [employer].  All rights reserved.   6 Clicks Short Forms Basic Mobility Inpatient Short Form   [facility]  \"6 Clicks\" V.2 Basic Mobility Inpatient Short Form   1. Turning from your back to your side while in a flat bed without using bedrails? 4 - None   2. Moving from lying on your back to sitting on the side of a flat bed without using bedrails? 3 - A Little   3. Moving to and from a bed to a chair (including a wheelchair)? 3 - A Little   4. Standing up from a chair using your arms (e.g., wheelchair, or bedside chair)? 3 - A Little   5. To walk in hospital room? 3 - A Little   6. Climbing 3-5 steps with a railing? 2 - A Lot   Basic Mobility Raw Score (Score out of 24.Lower scores equate to lower levels of function) 18   Total Evaluation Time   Total Evaluation Time (Minutes) 5     "

## 2018-01-02 NOTE — PROGRESS NOTES
[facility]  Hospitalist Progress Note  Berny Vázquez MD 01/02/2018    Reason for Stay (Diagnosis): influenza A         Assessment and Plan:      Summary of Stay: Mariano Clemente is a 86-year-old gentleman with a history of myasthenia gravis, who came to attention tonight with apparent cough and fever,  positive for influenza A.  he has also been treated for a UTI.            DIAGNOSES:   1.  Influenza A with acuted respiratory failure:  On RA and now doing much better (initiially required BIPAP).  A little confused at times, likely near baseline.     Nebs    Already on prednisone  daily for MG    Tamiflu 75 mg p.o. B.i.d. (will complete last dose this evening)     2.  Myasthenia gravis, with history of significant weakness.  does not appear to be in crisis.    Transition prednisone to 10mg daily per neuro recs - appears to be on 5 mg daily chronically    Appreciate neurology input    May d/c NIF checks as pt unable to participate effectively    Continue with pyridostigmine twice daily.        3.  DM 2    Medium ISS    Cont lantus 8 units q am     4.  Urinary tract infection, E. Coli - stop cipro, completed therapy        5.  Acute encephalopathy:  Suspect related to influenza and urinary tract infection; appears to be improving    Monitor, may have some mild underlying chronic confusion        DVT Prophylaxis: Enoxaparin (Lovenox) SQ  Code Status: Full Code     Disposition:  back to [facility]        Interval History (Subjective):      No complaints                  Physical Exam:      Last Vital Signs:  /74  Pulse 64  Temp 97.8  F (36.6  C) (Oral)  Resp 16  Ht 1.829 m (6')  Wt 82.1 kg (181 lb 1.6 oz)  SpO2 95%  BMI 24.56 kg/m2      Intake/Output Summary (Last 24 hours) at 01/02/18 1425  Last data filed at 01/02/18 1400   Gross per 24 hour   Intake              543 ml   Output                0 ml   Net              543 ml       Constitutional: Awake, alert, cooperative, no apparent  distress   Respiratory: Clear to auscultation bilaterally, no crackles or wheezing   Cardiovascular: Regular rate and rhythm, normal S1 and S2, and no murmur noted   Abdomen: Normal bowel sounds, soft, non-distended, non-tender   Skin: No rashes, no cyanosis, dry to touch   Neuro: Alert and oriented x3, no weakness, numbness, memory loss   Extremities: No edema, normal range of motion   Other(s):        All other systems: Negative          Medications:      All current medications were reviewed with changes reflected in problem list.         Data:      All new lab and imaging data was reviewed.   Labs:    Recent Labs  Lab 01/01/18  0724   WBC 9.1   HGB 11.8*   HCT 36.5*   MCV 96         Imaging:   No results found for this or any previous visit (from the past 24 hour(s)).

## 2018-01-02 NOTE — PLAN OF CARE
Problem: Patient Care Overview  Goal: Plan of Care/Patient Progress Review  Outcome: Therapy, progress toward functional goals is gradual  OT: Pt is an 86 year old male admitted with weakness, cough and fever.  Tested positive for influenza A.  PMH includes myasthenia gravis and baseline weakness, DM, Possible UTI, acute encephalopathy.  Per chart pt lives in nursing home.  He is unable to state where he lives at this time.    Discharge Planner OT   Patient plan for discharge: Discharge back to nursing home  Current status: Initial evaluation complete, treatment started.  Pt up in chair, partially eaten breakfast tray in front of him.  Answering questions, usually in one or two word responses.  Oriented only to self.  Demonstrates significant UE weakness.  Did have enough strengtht to use a fork with his left hand to eat.  Not able to attempt LE dressing.  Mod A to stand, pt took some small shuffling steps with walker.  Min A and verbal cues to sit.  Appears below baseline.  Per chart pt lives in a nursing home, appears to have had some improvement since admission.  OT will work with pt to increase strength for pt to be able to help more with caregiving.  Barriers to return to prior living situation: None to return to nursing home with 24 hour care.  Pt appears below baseline, will likely need additional help in nursing home.  Recommendations for discharge: Return to nursing home with 24 hour care and therapy   Rationale for recommendations: Per chart pt's normal residence is in a nursing home.  Appears to be improving slightly, will be able to return to nursing home with assist.       Entered by: Mariano Sanz 01/02/2018 9:39 AM

## 2018-01-02 NOTE — PROGRESS NOTES
01/02/18 0919   Quick Adds   Type of Visit Initial Occupational Therapy Evaluation   Living Environment   Lives With facility resident   Living Arrangements residential facility   Home Accessibility no concerns   Living Environment Comment pt states he lives at home with his wife, chart indicates pt is a nursing home resident   Self-Care   Dominant Hand right   Usual Activity Tolerance moderate   Current Activity Tolerance poor   Regular Exercise no   Equipment Currently Used at Home (pt not able to state. Walker in room, pt used during session)   Functional Level Prior   Ambulation 1-->assistive equipment   Transferring 1-->assistive equipment   Toileting 1-->assistive equipment   Bathing 2-->assistive person   Dressing 2-->assistive person   Eating 0-->independent   Communication 0-->understands/communicates without difficulty   Swallowing 0-->swallows foods/liquids without difficulty   Cognition 0 - no cognition issues reported   Fall history within last six months yes   Which of the above functional risks had a recent onset or change? ambulation;transferring;bathing;dressing   General Information   Onset of Illness/Injury or Date of Surgery - Date 01/01/18   Referring Physician Doua Her   Patient/Family Goals Statement not stated   Additional Occupational Profile Info/Pertinent History of Current Problem Pt admitted with weakness, cough and fever.  Tested positive for influenza A.  PMH includes myasthenia gravis and baseline weakness, DM, Possible UTI, acute encephalopathy   Precautions/Limitations fall precautions   General Observations pt sitting up in chair eating breakfast   Cognitive Status Examination   Orientation person   Level of Consciousness alert   Able to Follow Commands success, 1-step commands   Personal Safety (Cognitive) moderate impairment   Memory impaired   Attention Sustained attention impaired   Cognitive Comment answering mostly with one or two words   Visual Perception   Visual  Perception No deficits were identified   Pain Assessment   Patient Currently in Pain No   Range of Motion (ROM)   ROM Quick Adds No deficits were identified   ROM Comment B UEs   Strength   Manual Muscle Testing Quick Adds Other   Strength Comments Significant weakness in both arms, may be partially due to confusion   Hand Strength   Hand Strength Comments gross grasp fair and equal   Transfer Skill: Sit to Stand   Level of Worth: Sit/Stand moderate assist (50% patients effort)   Physical Assist/Nonphysical Assist: Sit/Stand verbal cues;1 person assist   Transfer Skill: Sit to Stand full weight-bearing   Assistive Device for Transfer: Sit/Stand rolling walker   Lower Body Dressing   Level of Worth: Dress Lower Body unable to perform   Eating/Self Feeding   Level of Worth: Eating stand-by assist   Physical Assist/Nonphysical Assist: Eating set-up required   Assistive Device (pt eating with left hand although he said he was right brandee)   Activities of Daily Living Analysis   Impairments Contributing to Impaired Activities of Daily Living balance impaired;cognition impaired;strength decreased   General Therapy Interventions   Planned Therapy Interventions ADL retraining;strengthening;transfer training   Clinical Impression   Criteria for Skilled Therapeutic Interventions Met yes, treatment indicated   OT Diagnosis decreased independence in ADLS   Influenced by the following impairments weakness, confusion, imbalance   Assessment of Occupational Performance 3-5 Performance Deficits   Identified Performance Deficits decreased independence and safety for dressing, grooming, bathing, functional transfers   Clinical Decision Making (Complexity) Low complexity   Therapy Frequency daily   Predicted Duration of Therapy Intervention (days/wks) 4 days   Anticipated Discharge Disposition Long Term Care Facility   Risks and Benefits of Treatment have been explained. Yes   Patient, Family & other staff in  "agreement with plan of care Yes   [facility] TM \"6 Clicks\"   2016, Trustees of [facility], under license to [employer].  All rights reserved.   6 Clicks Short Forms Daily Activity Inpatient Short Form   [facility]  \"6 Clicks\" Daily Activity Inpatient Short Form   1. Putting on and taking off regular lower body clothing? 1 - Total   2. Bathing (including washing, rinsing, drying)? 1 - Total   3. Toileting, which includes using toilet, bedpan or urinal? 2 - A Lot   4. Putting on and taking off regular upper body clothing? 2 - A Lot   5. Taking care of personal grooming such as brushing teeth? 2 - A Lot   6. Eating meals? 3 - A Little   Daily Activity Raw Score (Score out of 24.Lower scores equate to lower levels of function) 11   Total Evaluation Time   Total Evaluation Time (Minutes) 15     "

## 2018-01-02 NOTE — PLAN OF CARE
Problem: Patient Care Overview  Goal: Plan of Care/Patient Progress Review  Outcome: No Change  VSS except SBP in 160's. Alert and Oriented but very withdrawn this shift. Up with SBA/ Walker. Denies pain.  and 148. Ate approximally 50% of meal.

## 2018-01-02 NOTE — PLAN OF CARE
Problem: Patient Care Overview  Goal: Plan of Care/Patient Progress Review  PT: Orders received, evaluation completed and treatment initiated. 86 year old male admitted with weakness, tested positive for influenza A. Per H&P, patient is mildly compromised cognitively at baseline. Patient resides at [facility]. Spoke with facility [facility]. They report pt uses a wheelchair for mobility, but is able to transfer into it on his own with use of a FWW by himself. Per [facility], pt is able to manage on his own in the bathroom and is typically continent.     Discharge Planner PT   Patient plan for discharge: Not stated.   Current status: Sit to/from stand with CGA/min A. Needs several attempts and mod UE support to obtain standing. Ambulates 20' and 40' with FWW and CGA. Sit to supine and repositioning in bed with min A. Tolerates LE exercises.   Barriers to return to prior living situation: Current level of A needed, fall risk  Recommendations for discharge: Anticipate safe return to [facility] if they are able to provide Ax1 with all mobility  Rationale for recommendations: Patient would benefit from increased assist upon return to [facility]; appears to need more A compared to reported baseline. Will benefit from continued acute care PT to address strength deficits.        Entered by: Francia De La Cruz 01/02/2018 2:31 PM

## 2018-01-02 NOTE — PLAN OF CARE
Pt. Alert w/ intermittent confusion. Very withdrawn and flat affect. Needed a lot of encouragment to get out of bed or to do any other task. VSS, afebrile. . Transfers with SBA and walker. Incontinent. LS dim, productive, congested cough. Receiving Tamiflu and scheduled prednisone. Abx- Cipro for UTI. Plan for possible D/C 1-2 days.

## 2018-01-02 NOTE — PLAN OF CARE
Problem: Patient Care Overview  Goal: Plan of Care/Patient Progress Review    VSS ex HR koki. Denies pain. Lungs coarse, diminished. On cipro, tamiflu, prednisone. IS encouraged. Tolerating mod cho diet, incontinent of urine. Will continue to monitor.

## 2018-01-03 NOTE — DISCHARGE SUMMARY
DATE OF ADMISSION:   12/29/2017   DATE OF DISCHARGE:  01/03/2018      PRINCIPAL FINAL DIAGNOSES:   1.  Influenza A.   2.  Acute hypoxic respiratory failure secondary to #1, resolved.   3.  Urinary tract infection, completed therapy with antibiotics this admission.   4.  Infectious encephalopathy secondary to influenza A.      PAST MEDICAL HISTORY:   1.  Myasthenia gravis on chronic prednisone therapy.   2.  Diabetes mellitus type 2.   3.  Hypertension.      PRINCIPAL PROCEDURES:   1.  Urine culture grew Escherichia coli.   2.  Blood cultures without growth.   3.  Influenza titer positive for influenza A.   4.  Negative Clostridium difficile toxin.   5.  Chest x-ray with no acute findings.      REASON FOR ADMISSION:  Please see dictated History and Physical Examination by Dr. Owen.  The patient Mariano Clemente is an 86-year-old male with the above medical history who presented to the hospital with cough and fever.  He has a history of myasthenia gravis and takes chronic prednisone.  He was found to have influenza A.      HOSPITAL COURSE:     1.  Acute hypoxic respiratory failure secondary to influenza A.  The patient was admitted to the Intensive Care Unit with BiPAP.  He was treated with Tamiflu and gradually improved.  He did not require intubation.     2.  The patient did not appear to have a myasthenia gravis exacerbation this admission.  Neurology saw the patient in regard to his myasthenia history, and they recommended prednisone 10 mg daily while in hospital.  The patient will transition back to his usual dose of 5 mg daily on discharge from the hospital.        DISCHARGE MEDICATIONS:   1.  Metformin 500 mg twice a day, NEW MEDICATION for diabetes mellitus.   2.  Albuterol p.r.n.   3.  Gabapentin 600 mg daily and 300 mg at bedtime.   4.  Plavix 75 mg daily.   5.  Prednisone 5 mg daily.   6.  Mestinon 180 mg twice a day.   7.  Tylenol 1,000 mg q.8h. and 1,000 mg daily p.r.n. pain or fever.      DISPOSITION:  The  patient is discharged back to [facility] today.      CODE STATUS:  Full.      TOTAL TIME SPENT ON DISCHARGE PLANNING:  The patient was seen and examined on the day of discharge.  I spent greater than 30 minutes discharging the patient from the hospital today.         NINO TAYLOR MD             D: 2018 16:01   T: 2018 17:17   MT: [id number]      Name:     KARLI BUTTERFIELD   MRN:      [id number]        Account:        [id number]   :      10/06/1931           Admit Date:                                       Discharge Date: 2018      Document: [id number]       cc: [facility]

## 2018-01-03 NOTE — PROGRESS NOTES
Feels well.  No complaints.  Back to [facility] today      Last 24 hours Vitals signs,imaging,microbiology;laboratory results were reviewed by me in EPIC  GENERAL:  Comfortable.  PSYCH: pleasant, oriented, No acute distress.  HEART:  Normal S1, S2 with no edema.  LUNGS:  Clear to auscultation, normal Respiratory effort.  ABDOMEN:  Soft, no hepatosplenomegaly, normal bowel sounds.  SKIN:  Dry to touch, No rash.     Last Basic Metabolic Panel:  Lab Results   Component Value Date     01/01/2018      Lab Results   Component Value Date    POTASSIUM 4.1 01/01/2018     Lab Results   Component Value Date    CHLORIDE 108 01/01/2018     Lab Results   Component Value Date    DALTON 8.4 01/01/2018     Lab Results   Component Value Date    CO2 28 01/01/2018     Lab Results   Component Value Date    BUN 19 01/01/2018     Lab Results   Component Value Date    CR 0.95 01/01/2018     Lab Results   Component Value Date    GLC 93 01/01/2018          A/p  Influenza - completed therapy  DM - stop Lantus; start metformin on discharge

## 2018-01-03 NOTE — PLAN OF CARE
Problem: Patient Care Overview  Goal: Plan of Care/Patient Progress Review  Outcome: Improving  Pt alert and oriented, cooperative. Flat affect and confused at times. VSS afebrile, LS crackles, on Lovenox, completed last dose of Tamiflu this shift.  and 159. No c/o chest pain or difficulty breathing.

## 2018-01-03 NOTE — PLAN OF CARE
Problem: Urinary Tract Infection (Adult)  Goal: Signs and Symptoms of Listed Potential Problems Will be Absent, Minimized or Managed (Urinary Tract Infection)  Signs and symptoms of listed potential problems will be absent, minimized or managed by discharge/transition of care (reference Urinary Tract Infection (Adult) CPG).   Outcome: No Change  Mr. Clemente has been quite somnolent all night.  Able to respond appropriately but needed to repeat oneself. Mariano reports he is unable to know when he need to go to the bathroom. Patient found soiled of B&B. Patient changed and educated to call nurses when he feels soiled. He acknowledged. Patient is an A1 with walker. Not up this shift. BG running a bit high but stable. VSS with exception to serial HTN. Patient scheduled to back to [facility] today. Will continue to monitor.

## 2018-01-03 NOTE — PROGRESS NOTES
SWS:  SW notified that pt is able to return to [facility] today. SW spoke with wife who is requesting wc transport.  SW contacted [facility] and transport will be here at 2:15pm. Facility and RN updated.  Orders faxed.

## 2018-01-03 NOTE — PLAN OF CARE
Problem: Patient Care Overview  Goal: Plan of Care/Patient Progress Review    Discharge Planner OT   Patient plan for discharge: Home to [facility]  Current status: Pt completed sit > stand from chair to FWW with min A to boost, ambulated to/from BR FWW level with CGA, assist required for safe management of FWW. Pt required min A for toilet transfer on/off with use of grab bar. Pt required max A for toileting tasks including changing of soiled brief, hygiene and management of brief up over hips - pt with no initiation to assist with task despite vcs. Pt completed grooming tasks of denture care and face washing in stance at sink, vcs required to turn off water.   Barriers to return to prior living situation: Current level of assist, fall risk  Recommendations for discharge: Anticipate safe return to [facility] if they are able to provide Ax1 with all mobility as well as increased support for toileting  Rationale for recommendations: Pt would benefit from increased assist upon return to [facility], will require assist for toileting tasks (was previously managing himself) and mobility.        Entered by: Darline Vargas 01/03/2018 10:40 AM     Occupational Therapy Discharge Summary    Reason for therapy discharge:    Discharged to [facility]    Progress towards therapy goal(s). See goals on Care Plan in [facility] electronic health record for goal details.  Goals not met.  Barriers to achieving goals:   discharge from facility.    Therapy recommendation(s):    Recommend increased assist from [facility] staff for mobility and toileting.

## 2018-02-03 PROBLEM — A41.9 SEPTIC SHOCK (H): Status: ACTIVE | Noted: 2018-01-01

## 2018-02-03 PROBLEM — R65.21 SEPTIC SHOCK (H): Status: ACTIVE | Noted: 2018-01-01

## 2018-02-03 NOTE — IP AVS SNAPSHOT
[place] [id number] [facility]    [address]    [facility] [address]    Phone:  [phone]    Fax:  [phone]                                       After Visit Summary   2/3/2018    Mariano Clemente    MRN: [id number]           After Visit Summary Signature Page     I have received my discharge instructions, and my questions have been answered. I have discussed any challenges I see with this plan with the nurse or doctor.    ..........................................................................................................................................  Patient/Patient Representative Signature      ..........................................................................................................................................  Patient Representative Print Name and Relationship to Patient    ..................................................               ................................................  Date                                            Time    ..........................................................................................................................................  Reviewed by Signature/Title    ...................................................              ..............................................  Date                                                            Time

## 2018-02-03 NOTE — IP AVS SNAPSHOT
[place] [address] MEDICAL SURGICAL: [phone]                                              INTERAGENCY TRANSFER FORM - LAB / IMAGING / EKG / EMG RESULTS   2/3/2018                    Hospital Admission Date: 2/3/2018  KARLI BUTTERFIELD   : 10/6/1931  Sex: Male        Attending Provider: Adri Ventura MD     Allergies:  Asa [Aspirin], Cephalosporins, Diphenhydramine, Metformin, Penicillins, Strawberry, Pravastatin    Infection:  None   Service:  X    Ht:  1.829 m (6')   Wt:  80.7 kg (177 lb 14.6 oz)   Admission Wt:  81.1 kg (178 lb 12.7 oz)    BMI:  24.13 kg/m 2   BSA:  2.02 m 2            Patient PCP Information     Provider PCP Type    [facility]         Lab Results - 3 Days      Blood culture ONE site [[id number]] (Abnormal)  Resulted: 18 0735, Result status: Final result    Ordering provider: Rufina Holland MD  18 Resulting lab: INFECT[facility] DISEASE DIAGNOSTIC LABORATORY    Specimen Information    Type Source Collected On   Blood Arm, Right 18   Comment:  Right Arm          Components       Value Reference Range Flag Lab   Specimen Description Blood Right Arm      Special Requests Aerobic and anaerobic bottles received   75   Culture Micro --  A 225   Result:         Cultured on the 1st day of incubation:  Escherichia coli  Susceptibility testing done on previous specimen     Culture Micro --   225   Result:         Critical Value/Significant Value, preliminary result only, called to and read back by  Jessie PADILLAU @ 1005.cg 18              Blood culture ONE site [[id number]] (Abnormal)  Resulted: 18 0733, Result status: Final result    Ordering provider: Rufina Holland MD  18 Resulting lab: INFECTIOUS DISEASE DIAGNOSTIC LABORATORY    Specimen Information    Type Source Collected On   Blood Hand, Left 18   Comment:  Left Hand          Components       Value Reference Range Flag Lab   Specimen  Description Blood Left Hand      Special Requests Aerobic and anaerobic bottles received   75   Culture Micro --  A 225   Result:         Cultured on the 1st day of incubation:  Escherichia coli     Culture Micro --   225   Result:         Critical Value/Significant Value, preliminary result only, called to and read back by  Jessie LAWSON @ 0928.cg 02/04/18     Culture Micro --   225   Result:         (Note)  POSITIVE for E.COLI by Verigene multiplex nucleic acid test. Final  identification and antimicrobial susceptibility testing will be  verified by standard methods. Verigene test will not distinguish  E.coli from Shigella species including S.dysenteriae, S.flexneri,  S.boydii, and S.sonnei. Specimens containing Shigella species or  E.coli will be reported as Positive for E.coli.    Specimen tested with Verigene multiplex, gram-negative blood culture  nucleic acid test for the following targets: Acinetobacter sp.,  Citrobacter sp., Enterobacter sp., Proteus sp., E. coli, K.  pneumoniae/oxytoca, P. aeruginosa, and the following resistance  markers: CTXM, KPC, NDM, VIM, IMP and OXA.    Critical Value/Significant Value called to and read back by Jessie PADILLAU @ 1156.cg 02/04/18              Urine Culture Aerobic Bacterial [[id number]] (Abnormal)  Resulted: 02/06/18 0732, Result status: Preliminary result    Ordering provider: Rufina Holland MD  02/03/18 1920 Resulting lab: INFECTIOUS [facility]EASE DI[facility]NOSTIC LAB[facility]AT[facility]    Specimen Information    Type Source Collected On   Catheterized Urine  02/03/18 1920          Components       Value Reference Range Flag Lab   Specimen Description Catheterized Urine      Special Requests Specimen received in preservative   75   Culture Micro --  A 225   Result:         >100,000 colonies/mL  Escherichia coli     Culture Micro --  A 225   Result:         >100,000 colonies/mL  Strain 2  Escherichia coli  Piperacillin/Tazobactam susceptibilities in  progress  2.6.2018              Procalcitonin [[id number]] (Abnormal)  Resulted: 02/04/18 1147, Result status: Final result    Ordering provider: Adri Ventura MD  02/04/18 0207 Resulting lab: [facility]    Specimen Information    Type Source Collected On   Blood  02/04/18 0500          Components       Value Reference Range Flag Lab   Procalcitonin >200.00 ng/ml [facility]   Comment:         >/= 10.00 ng/ml   Very high likelihood of severe sepsis or septic shock.  Recommendation: Strongly recommend initiating or continuing antibiotics.   Evaluate culture results and clinical features to target antibacterial   therapy. Obtain blood cultures and other relevant cultures if not done.Repeat   PCT in 2 days to guide antibiotic de-escalation. Consider de-escalating   antibiotics when PCT concentration is <80% of peak or abs PCT <1.  Critical Value called to and read back by  GARRETT GAGNON AT [facility] ICU AT 1147 BAR              Glucose by meter [[id number]] (Abnormal)  Resulted: 02/04/18 1000, Result status: Final result    Ordering provider: Adri Ventura MD  02/04/18 0956 Resulting lab: POINT OF CARE TEST, GLUCOSE    Specimen Information    Type Source Collected On     02/04/18 0956          Components       Value Reference Range Flag Lab   Glucose 196 70 - 99 mg/dL H 170            D dimer quantitative [[id number]] (Abnormal)  Resulted: 02/04/18 0921, Result status: Final result    Ordering provider: Manny Joy MD  02/04/18 0738 Resulting lab: [facility]    Specimen Information    Type Source Collected On   Blood  02/04/18 0745          Components       Value Reference Range Flag Lab   D Dimer 4.2 0.0 - 0.50 ug/ml FEU [facility]   Comment:         This D-dimer assay is intended for use in conjunction with a clinical pretest   probability assessment model to exclude pulmonary embolism (PE) and deep   venous thrombosis (DVT) in outpatients suspected of PE or DVT. The cut-off    value is 0.5 ug/mL FEU.              Partial thromboplastin time [[id number]] (Abnormal)  Resulted: 02/04/18 0851, Result status: Final result    Ordering provider: Manny Joy MD  02/04/18 0745 Resulting lab: [facility]    Specimen Information    Type Source Collected On     02/04/18 0745          Components       Value Reference Range Flag Lab   PTT 45 22 - 37 sec H FrRdHs            Glucose by meter [[id number]] (Abnormal)  Resulted: 02/04/18 0815, Result status: Final result    Ordering provider: Adri Ventura MD  02/04/18 0758 Resulting lab: POINT OF CARE TEST, GLUCOSE    Specimen Information    Type Source Collected On     02/04/18 0758          Components       Value Reference Range Flag Lab   Glucose 195 70 - 99 mg/dL H 170            Methicillin Resist/Sens S. aureus PCR [[id number]]  Resulted: 02/04/18 0537, Result status: Final result    Ordering provider: Adri Ventura MD  02/04/18 0009 Resulting lab: [facility]    Specimen Information    Type Source Collected On   Nares  02/04/18 0002          Components       Value Reference Range Flag Lab   Specimen Description Nares   FrRdHs   Methicillin Resist/Sens S. aureus PCR Negative NEG^Negative  75   Comment:         MRSA Negative: SA Negative  MRSA and Staphylococcus aureus target DNA not   detected, presumed negative for MRSA and SA colonization or the number of   bacteria present may be below the limit of detection for the assay. FDA   approved assay performed using [employer] GeneXpert(R) real-time PCR.              Magnesium [[id number]] (Abnormal)  Resulted: 02/04/18 0527, Result status: Final result    Ordering provider: Adri Ventura MD  02/04/18 0207 Resulting lab: [facility]    Specimen Information    Type Source Collected On   Blood  02/04/18 0500          Components       Value Reference Range Flag Lab   Magnesium 1.4 1.6 - 2.3 mg/dL L FrRdHs            Phosphorus  [[id number]]  Resulted: 02/04/18 0527, Result status: Final result    Ordering provider: Adri Ventura MD  02/04/18 0207 Resulting lab: [facility]    Specimen Information    Type Source Collected On   Blood  02/04/18 0500          Components       Value Reference Range Flag Lab   Phosphorus 3.6 2.5 - 4.5 mg/dL  FrRdHs            Basic metabolic panel [[id number]] (Abnormal)  Resulted: 02/04/18 0527, Result status: Final result    Ordering provider: Manny Joy MD  02/04/18 0342 Resulting lab: [facility]    Specimen Information    Type Source Collected On   Blood  02/04/18 0500          Components       Value Reference Range Flag Lab   Sodium 141 133 - 144 mmol/L  FrRdHs   Potassium 3.8 3.4 - 5.3 mmol/L  FrRdHs   Chloride 110 94 - 109 mmol/L H FrRdHs   Carbon Dioxide 22 20 - 32 mmol/L  FrRdHs   Anion Gap 9 3 - 14 mmol/L  FrRdHs   Glucose 220 70 - 99 mg/dL H FrRdHs   Urea Nitrogen 48 7 - 30 mg/dL H FrRdHs   Creatinine 2.03 0.66 - 1.25 mg/dL H FrRdHs   GFR Estimate 31 >60 mL/min/1.7m2 L FrRdHs   Comment:  Non  GFR Calc   GFR Estimate If Black 38 >60 mL/min/1.7m2 L FrRdHs   Comment:  African American GFR Calc   Calcium 7.9 8.5 - 10.1 mg/dL L FrRdHs            CBC with platelets [[id number]] (Abnormal)  Resulted: 02/04/18 0519, Result status: Edited Result - FINAL    Ordering provider: Adri Ventura MD  02/04/18 0207 Resulting lab: [facility]    Specimen Information    Type Source Collected On   Blood  02/04/18 0500          Components       Value Reference Range Flag Lab   WBC 35.7 4.0 - 11.0 10e9/L H FrRdHs   RBC Count 3.30 4.4 - 5.9 10e12/L L FrRdHs   Hemoglobin 10.1 13.3 - 17.7 g/dL L FrRdHs   Hematocrit 31.9 40.0 - 53.0 % L FrRdHs   MCV 97 78 - 100 fl  FrRdHs   MCH 30.6 26.5 - 33.0 pg  FrRdHs   MCHC 31.7 31.5 - 36.5 g/dL  FrRd   RDW 13.9 10.0 - 15.0 %  FrRd   Platelet Count 251 150 - 450 10e9/L  Rd            Lactic acid [[id number]]  (Abnormal)  Resulted: 02/04/18 0510, Result status: Final result    Ordering provider: Manny Joy MD  02/04/18 0342 Resulting lab: [facility]    Specimen Information    Type Source Collected On   Blood  02/04/18 0500          Components       Value Reference Range Flag Lab   Lactic Acid 3.9 0.4 - 2.0 mmol/L H FrRdHs            Glucose by meter [[id number]] (Abnormal)  Resulted: 02/04/18 0441, Result status: Final result    Ordering provider: Adri Ventura MD  02/04/18 0435 Resulting lab: POINT OF CARE TEST, GLUCOSE    Specimen Information    Type Source Collected On     02/04/18 0435          Components       Value Reference Range Flag Lab   Glucose 222 70 - 99 mg/dL H 170   Comment:  /RN Notified            Basic metabolic panel [[id number]] (Abnormal)  Resulted: 02/04/18 0239, Result status: Final result    Ordering provider: Adri Ventura MD  02/04/18 0207 Resulting lab: [facility]    Specimen Information    Type Source Collected On   Blood  02/04/18 0200          Components       Value Reference Range Flag Lab   Sodium 141 133 - 144 mmol/L  FrRdHs   Potassium 3.9 3.4 - 5.3 mmol/L  FrRdHs   Chloride 110 94 - 109 mmol/L H FrRdHs   Carbon Dioxide 20 20 - 32 mmol/L  FrRdHs   Anion Gap 11 3 - 14 mmol/L  FrRdHs   Glucose 253 70 - 99 mg/dL H FrRdHs   Urea Nitrogen 47 7 - 30 mg/dL H FrRdHs   Creatinine 1.97 0.66 - 1.25 mg/dL H FrRdHs   GFR Estimate 32 >60 mL/min/1.7m2 L FrRdHs   Comment:  Non  GFR Calc   GFR Estimate If Black 39 >60 mL/min/1.7m2 L FrRdHs   Comment:  African American GFR Calc   Calcium 7.9 8.5 - 10.1 mg/dL L FrRdHs            Lactic acid [[id number]] (Abnormal)  Resulted: 02/04/18 0223, Result status: Final result    Ordering provider: Adri Ventura MD  02/04/18 0207 Resulting lab: [facility]    Specimen Information    Type Source Collected On   Blood  02/04/18 0200          Components       Value Reference Range Flag Lab    Lactic Acid 4.6 0.4 - 2.0 mmol/L [facility]   Comment:         Critical Value called to and read back by  Erma Alejo (ICU) on 02.04.18 at 0219 by OH              Methicillin resistant staph aureus cult [[id number]]  Resulted: 02/04/18 0025, Result status: Final result    Ordering provider: Adri Ventura MD  02/04/18 0005 Resulting lab: [facility]    Specimen Information    Type Source Collected On   Nares  02/04/18 0005          Components       Value Reference Range Flag Lab   Specimen Description Nares      Culture Micro --   FrRd   Result:         Canceled, Test credited  Incorrectly ordered by PCU/Clinic     Culture Micro Charge credited   FrRd   Result:              Glucose by meter [[id number]] (Abnormal)  Resulted: 02/04/18 0021, Result status: Final result    Ordering provider: Adri Ventura MD  02/04/18 0013 Resulting lab: POINT OF CARE TEST, GLUCOSE    Specimen Information    Type Source Collected On     02/04/18 0013          Components       Value Reference Range Flag Lab   Glucose 258 70 - 99 mg/dL H 170   Comment:  /RN Notified            Clostridium difficile toxin B PCR [[id number]]  Resulted: 02/03/18 2239, Result status: Final result    Ordering provider: Rufina Holland MD  02/03/18 1927 Resulting lab: [facility]    Specimen Information    Type Source Collected On   Feces  02/03/18 1845          Components       Value Reference Range Flag Lab   Specimen Description Feces   FrRdHs   C Diff Toxin B PCR Negative NEG^Negative  51   Comment:         Negative: Clostridium difficile target DNA sequences NOT detected, presumed   negative for Clostridium difficile toxin B or the number of bacteria present   may be below the limit of detection for the test.  FDA approved assay performed using [employer] GeneXpert real-time PCR.  A negative result does not exclude actual disease due to Clostridium difficile   and may be due to improper  collection, handling and storage of the specimen   or the number of organisms in the specimen is below the detection limit of the   assay.              Procalcitonin [[id number]] (Abnormal)  Resulted: 02/03/18 2105, Result status: Final result    Ordering provider: Rufina Holland MD  02/03/18 1810 Resulting lab: [facility]    Specimen Information    Type Source Collected On     02/03/18 1810          Components       Value Reference Range Flag Lab   Procalcitonin 26.65 ng/ml [facility]   Comment:         >/= 10.00 ng/ml   Very high likelihood of severe sepsis or septic shock.  Recommendation: Strongly recommend initiating or continuing antibiotics.   Evaluate culture results and clinical features to target antibacterial   therapy. Obtain blood cultures and other relevant cultures if not done.Repeat   PCT in 2 days to guide antibiotic de-escalation. Consider de-escalating   antibiotics when PCT concentration is <80% of peak or abs PCT <1.  Critical Value called to and read back by  BUSTER RALPH [facility] 2104 DP              Lactic acid whole blood [[id number]] (Abnormal)  Resulted: 02/03/18 2051, Result status: Final result    Ordering provider: Rufina Holland MD  02/03/18 2041 Resulting lab: [facility]    Specimen Information    Type Source Collected On     02/03/18 2041          Components       Value Reference Range Flag Lab   Lactic Acid 8.2 0.7 - 2.0 mmol/L [facility]   Comment:         Critical Value called to and read back by  VANDANA HAWKINS ([facility]) ON 02.03.2018 AT 2051 BY               Lactic acid whole blood [[id number]] (Abnormal)  Resulted: 02/03/18 1949, Result status: Final result    Ordering provider: Rufina Holland MD  02/03/18 1929 Resulting lab: [facility]    Specimen Information    Type Source Collected On     02/03/18 1929          Components       Value Reference Range Flag Lab   Lactic Acid 9.7 0.7 - 2.0 mmol/L [facility]    Comment:         Critical Value called to and read back by  BILLY GLEASON ([facility]) ON 02.03.2018 AT 1949 SP              UA with Microscopic reflex to Culture [[id number]] (Abnormal)  Resulted: 02/03/18 1943, Result status: Final result    Ordering provider: Rufina Holland MD  02/03/18 1815 Resulting lab: [facility]    Specimen Information    Type Source Collected On   Catheterized Urine Urine catheter 02/03/18 1920          Components       Value Reference Range Flag Lab   Color Urine Shante   FrRdHs   Appearance Urine Cloudy   FrRdHs   Glucose Urine Negative NEG^Negative mg/dL  FrRdHs   Bilirubin Urine Negative NEG^Negative  FrRdHs   Ketones Urine Negative NEG^Negative mg/dL  FrRdHs   Specific Gravity Urine 1.026 1.003 - 1.035  FrRdHs   Blood Urine Large NEG^Negative A FrRdHs   pH Urine 5.0 5.0 - 7.0 pH  FrRdHs   Protein Albumin Urine 100 NEG^Negative mg/dL A FrRdHs   Urobilinogen mg/dL 2.0 0.0 - 2.0 mg/dL  FrRdHs   Nitrite Urine Negative NEG^Negative  FrRdHs   Leukocyte Esterase Urine Large NEG^Negative A FrRdHs   Source Catheterized Urine   FrRdHs   WBC Urine >182 0 - 2 /HPF H FrRdHs   RBC Urine >182 0 - 2 /HPF H FrRdHs   WBC Clumps Present NEG^Negative /HPF A FrRdHs   Bacteria Urine Many NEG^Negative /HPF A FrRdHs   Squamous Epithelial /HPF Urine 1 0 - 1 /HPF  FrRdHs   Mucous Urine Present NEG^Negative /LPF A FrRdHs   sperm Present NEG^Negative /HPF A FrRdHs            Lactic acid [[id number]]  Resulted: 02/03/18 1937, Result status: In process    Ordering provider: Rufina Holland MD  02/03/18 1843 Resulting lab: [facility]    Specimen Information    Type Source Collected On   Blood  02/03/18 1929            CBC with platelets differential [[id number]] (Abnormal)  Resulted: 02/03/18 1853, Result status: Final result    Ordering provider: Rufina Holland MD  02/03/18 1815 Resulting lab: [facility]    Specimen Information    Type Source Collected On   Blood  02/03/18 1810           Components       Value Reference Range Flag Lab   WBC 8.5 4.0 - 11.0 10e9/L  Rd   RBC Count 4.56 4.4 - 5.9 10e12/L  [facility]   Hemoglobin 13.5 13.3 - 17.7 g/dL  Rd   Hematocrit 43.6 40.0 - 53.0 %  [facility]   MCV 96 78 - 100 fl  FrRd   MCH 29.6 26.5 - 33.0 pg  Rd   MCHC 31.0 31.5 - 36.5 g/dL L FrRd   RDW 13.5 10.0 - 15.0 %  Rd   Platelet Count 322 150 - 450 10e9/L  [facility]   Diff Method Manual Differential   [facility]   % Neutrophils 82.0 %  Rd   % Lymphocytes 11.0 %  FrRd   % Monocytes 2.0 %  RdHs   % Eosinophils 0.0 %  Rd   % Basophils 1.0 %  Rd   % Metamyelocytes 3.0 %  Rd   % Myelocytes 1.0 %  [facility]   Absolute Neutrophil 7.0 1.6 - 8.3 10e9/L  Rd   Absolute Lymphocytes 0.9 0.8 - 5.3 10e9/L  FrRdHs   Absolute Monocytes 0.2 0.0 - 1.3 10e9/L  FrRdHs   Absolute Eosinophils 0.0 0.0 - 0.7 10e9/L  FrRdHs   Absolute Basophils 0.1 0.0 - 0.2 10e9/L  FrRdHs   Absolute Metamyelocytes 0.3 0 10e9/L H FrRdHs   Absolute Myelocytes 0.1 0 10e9/L H Rd   RBC Morphology Consistent with reported results   [facility]   Platelet Estimate Automated count confirmed.  Platelet morphology is normal.   [facility]            Basic metabolic panel (BMP) [[id number]] (Abnormal)  Resulted: 02/03/18 1845, Result status: Final result    Ordering provider: Rufina Holland MD  02/03/18 1815 Resulting lab: [facility]    Specimen Information    Type Source Collected On   Blood  02/03/18 1810          Components       Value Reference Range Flag Lab   Sodium 139 133 - 144 mmol/L  FrRd   Potassium 4.3 3.4 - 5.3 mmol/L  FrRdHs   Chloride 108 94 - 109 mmol/L  FrRdHs   Carbon Dioxide 15 20 - 32 mmol/L L FrRd   Anion Gap 16 3 - 14 mmol/L H Rd   Glucose 254 70 - 99 mg/dL H FrRd   Urea Nitrogen 48 7 - 30 mg/dL H Rd   Creatinine 1.70 0.66 - 1.25 mg/dL H [facility]   GFR Estimate 38 >60 mL/min/1.7m2 L [facility]   Comment:  Non  GFR Calc   GFR Estimate If Black 46 >60 mL/min/1.7m2 L  [facility]   Comment:  African American GFR Calc   Calcium 9.2 8.5 - 10.1 mg/dL  FrRdHs            Hepatic panel [[id number]] (Abnormal)  Resulted: 02/03/18 1845, Result status: Final result    Ordering provider: Rufina Holland MD  02/03/18 1810 Resulting lab: [facility]    Specimen Information    Type Source Collected On     02/03/18 1810          Components       Value Reference Range Flag Lab   Bilirubin Direct 0.3 0.0 - 0.2 mg/dL H FrRdHs   Bilirubin Total 0.8 0.2 - 1.3 mg/dL  FrRdHs   Albumin 2.5 3.4 - 5.0 g/dL L FrRdHs   Protein Total 7.6 6.8 - 8.8 g/dL  FrRdHs   Alkaline Phosphatase 340 40 - 150 U/L H FrRdHs   ALT 11 0 - 70 U/L  FrRdHs   AST 10 0 - 45 U/L  FrRdHs            Partial thromboplastin time [[id number]]  Resulted: 02/03/18 1839, Result status: Final result    Ordering provider: Rufina Holland MD  02/03/18 1815 Resulting lab: [facility]    Specimen Information    Type Source Collected On   Blood  02/03/18 1810          Components       Value Reference Range Flag Lab   PTT 34 22 - 37 sec  FrRdHs            INR [[id number]] (Abnormal)  Resulted: 02/03/18 1839, Result status: Final result    Ordering provider: Rufina Holland MD  02/03/18 1810 Resulting lab: [facility]    Specimen Information    Type Source Collected On     02/03/18 1810          Components       Value Reference Range Flag Lab   INR 1.19 0.86 - 1.14 H FrRdHs            Glucose by meter [[id number]] (Abnormal)  Resulted: 02/03/18 1823, Result status: Final result    Ordering provider: Unknown, Provider  02/03/18 1814 Resulting lab: POINT OF CARE TEST, GLUCOSE    Specimen Information    Type Source Collected On     02/03/18 1814          Components       Value Reference Range Flag Lab   Glucose 265 70 - 99 mg/dL H 170   Comment:  /RN Notified            Lactic acid whole blood [[id number]]  Resulted: 02/03/18 1822, Result status: In process    Ordering provider: Unknown, Provider   02/03/18 1813 Resulting lab: [facility]    Specimen Information    Type Source Collected On     02/03/18 1813            Testing Performed By     Lab - Abbreviation Name Director Address Valid Date Range    12 - [facility] Unknown [address] [place] [address] 05/08/15 1057 - Present    14 - [facility] Unknown [address] [place] [address] 05/08/15 1057 - Present    45 - NZ[facility] Unknown Unknown 01/28/02 0000 - Present    51 - Unknown [facility] Unknown [address] [facility] [address] 12/31/14 1010 - Present    75 - Unknown [facility] Unknown [address] [facility] [address] 01/15/15 1019 - Present    170 - Unknown POINT OF CARE TEST, GLUCOSE Unknown Unknown 10/31/11 1114 - Present    225 - Unknown [facility] Unknown [address] [facility] [address] 12/19/14 0954 - Present            Unresulted Labs     None         Imaging Results - 3 Days      XR Chest Port 1 View [[id number]]  Resulted: 02/04/18 0628, Result status: Final result    Ordering provider: Adri Ventura MD  02/03/18 2353 Resulted by: Kwadwo Aguilar MD    Performed: 02/04/18 0001 - 02/04/18 0030 Resulting lab: RADIOLOGY RESULTS    Narrative:       XR CHEST PORT 1 VIEW   2/4/2018 12:30 AM     HISTORY: RN placed PICC - verify tip placement.     COMPARISON: 2/3/2018.    FINDINGS: Upright portable chest. A right PICC has been placed and the  tip is in the distal SVC in good position. No pneumothorax. The heart  size is normal. There is left basilar infiltrate. The lungs are  otherwise clear.      Impression:       IMPRESSION: Right PICC to distal SVC.    KWADWO AGUILAR MD      Chest  XR, 1 view PORTABLE [[id number]]  Resulted: 02/03/18 2049, Result status: Final result    Ordering provider: Rufina Holland MD  02/03/18 1809 Resulted by: Michael Mccollum MD    Performed:  02/03/18 1819 - 02/03/18 1828 Resulting lab: RADIOLOGY RESULTS    Narrative:       CHEST PORTABLE ONE VIEW    2/3/2018 6:28 PM     HISTORY: Septic/respiratory distress.      COMPARISON: Chest x-ray 12/29/2017.      Impression:       IMPRESSION: New patchy opacities medial left lung base that could be  atelectasis or airspace disease. Right lung is clear. Normal cardiac  silhouette.     ODV LINK MD      Testing Performed By     Lab - Abbreviation Name Director Address Valid Date Range    104 - [facility] RADIOLOGY RESULTS Unknown Unknown 02/16/05 1553 - Present            Encounter-Level Documents:     There are no encounter-level documents.      Order-Level Documents:     There are no order-level documents.

## 2018-02-03 NOTE — IP AVS SNAPSHOT
` ` Patient Information     Patient Name Sex     Mariano Clemente ([id number]) Male 10/6/1931       Room Bed    0500 [id number]      Patient Demographics     Address Phone    [facility]  [address] [facility] [address] [phone] (Home)  none (Work)  none (Mobile)      Patient Ethnicity & Race     Ethnic Group Patient Race     White      Emergency Contact(s)     Name Relation Home Work Mobile    Yu Clemente Spouse [phone] none [phone]    Danae Clemente Grandchild [phone]      Jhon Orozco Son [phone]      Demetrius Orozco Son   [phone]    Brant Clemente Son   [phone]      Documents on File        Status Date Received Description       Documents for the Patient    Insurance Card Received 12     External Medication Information Consent       Patient ID Received 12     Consent for Services - Hospital/Clinic Received () 12     Privacy Notice - [place] Received 17     Privacy Notice - [place] Received 12     Consent for EHR Access  13 Copied from existing Consent for services - C/HOD collected on 2012    [facility] Specified Other       Consent for Services/Privacy Notice - Hospital/Clinic Received () 16     HIM CHELI Authorization - File Only  08/10/16 [facility]    Power of  Received 16 POWER OF   12    Care Everywhere Prospective Auth Received 17     Advance Directives and Living Will Received 18 POLST 2017    Consent for Services/Privacy Notice - Hospital/Clinic  18 CONSENT FOR SERVICE    Privacy Notice - [place] Received (Deleted) 12        Documents for the Encounter    CMS IM for Patient Signature Received 18     CE Point of Care Auth Received        Admission Information     Attending Provider Admitting Provider Admission Type Admission Date/Time    Adri Ventura MD Rehman, Tauseef Ur, MD Emergency 18  4824    Discharge Date  Hospital Service Auth/Cert Status Service Area     x Incomplete [facility]    Unit Room/Bed Admission Status        5 MEDICAL SURGICAL 0500/0500-01 Admission (Confirmed)       Admission     Complaint    Septic shock (H)      Hospital Account     Name Acct ID Class Status Primary Coverage    Mariano Clemente [id number] Inpatient Open MEDICA - [employer] [facility]            Guarantor Account (for Hospital Account #[id number])     Name Relation to Pt Service Area Active? Acct Type    Mariano Clemente  FCS Yes Personal/Family    Address Phone          [facility]  [address] [place] [address] [phone](H)  none(O)              Coverage Information (for Hospital Account #[id number])     F/O Payor/Plan Precert #    MEDICA/[employer] [facility]     Subscriber Subscriber #    Mariano Clemente [id number]    Address Phone    [address] [address]  [place] [address] [phone]

## 2018-02-03 NOTE — IP AVS SNAPSHOT
"` `     [place] [address] MEDICAL SURGICAL: [phone]                 INTERAGENCY TRANSFER FORM - NOTES (H&P, Discharge Summary, Consults, Procedures, Therapies)   2/3/2018                    Hospital Admission Date: 2/3/2018  KARLI CLEMENTE   : 10/6/1931  Sex: Male        Patient PCP Information     Provider PCP Type    [facility]      History & Physicals     No notes of this type exist for this encounter.         Discharge Summaries      Discharge Summaries by Sailaja Johnson MD at 2018 10:36 AM     Author:  Sailaja oJhnson MD Service:  Hospitalist Author Type:  Physician    Filed:  2018 10:44 AM Date of Service:  2018 10:36 AM Creation Time:  2018 10:35 AM    Status:  Signed :  Sailaja Johnson MD (Physician)         [facility]    Discharge Summary  Hospitalist    Date of Admission:  2/3/2018  Date of Discharge:[PK1.1]  2018[PK1.2]  Discharging Provider:[PK1.1] Sailaja Johnson MD    Discharge Diagnoses[PK1.2]    1. Severe sepsis with acute hypoxic respiratory failure due to possible gram-negative pneumonia.   2. Acute renal failure due, in part, to sepsis.  3. Severe lactic acidosis.  4. Hx Myasthenia gravis.    5. Type 2 diabetes.    6. Comfort care.      Chief complaint: Altered Mental Status & Hypoxia[PK1.1]    History of Present Illness[PK1.2]   Karli Clemente is an anticoagulated 86 year old male, DNI status, on Plavix who presents via EMS with altered mental status and hypoxia. Staff at his care facility contacted EMS due to patient having altered mental status and profound weakness. On EMS arrival patient had fever at 104, 77% oxygen saturation on room air, and heart rate 160. Lungs sounded like \"crud\" per EMS. Blood pressures remained around 150/80 en route to the hospital. 18 gauge IV established en route. Blood sugar 289. Per staff patient is normally quiet but alert.    [PK1.1]    Hospital " Course[PK1.2]   Mariano Clemente was admitted on 2/3/2018.  The following problems were addressed during his hospitalization:[PK1.1]    Active Problems:    Septic shock (H)[PK1.2]    Summary of Stay: Mariano Clemente is an 86 year old male with history of myasthenia gravis and type 2 diabetes.  He presented to the ED on 2/3/2018 with fever, increased tremor, hypoxia and change in mental status.  He was found to have fever with temp of 101.8, tachycardia with HR of 124, hypoxia with SaO2 of 70%, renal failure with BUN of 41 and creatinine of 2, and lactic acid of 8.1.  Left sided pneumonia was noted on chest x-ray.  Procalcitonin was checked and later came back elevated at 26.65.  He was admitted to the hospital, treated with meropenem for possible gram-negative pneumonia sepsis, hydrated, and given supplemental oxygen.  In accordance with his previously stated wishes he was made DNR/DNI.  After admission he declined.  Lactic acidosis did not improve.  To admission, white blood cell count rosana from 8.5-35.7.  Renal function did not improve.  Pro-calcitonin rosana to greater than 200.  Mental status did not improve.  My colleague met with patient's wife and spoke with several family members.  Based on several factors, including Mariano's quality of life, critical illness, and his previously expressed wishes for limited care the decision was made to pursue comfort care but not treat underlying pneumonia and sepsis.  Antibiotics were stopped.  Nonessential medications were stopped.    - Palliative care was consulted.  - Hospice to consult after d/c to continue comfort care measures.  - PTA prednisone & Mestinon were discontinued at discharge  -informed pt's wife of d/c    Sailaja Johnson MD.[PK1.1]    Significant Results and Procedures[PK1.2]   No operations/procedures[PK1.1]    Pending Results[PK1.2]   Pt on comfort care measures.[PK1.1]   Unresulted Labs Ordered in the Past 30 Days of this Admission     Date and Time Order  Name Status Description    2/3/2018 1920 Urine Culture Aerobic Bacterial Preliminary           Code Status[PK1.2]   Comfort Care[PK1.1]       Primary Care Physician   [facility]    Physical Exam         Pulse: 117 Heart Rate: 126 Resp: 26 SpO2: 97 % O2 Device: None (Room air)    Vitals:    02/03/18 1915 02/03/18 2315   Weight: 81.1 kg (178 lb 12.7 oz) 80.7 kg (177 lb 14.6 oz)[PK1.2]     Vital Signs with Ranges[PK1.1]  Pulse:  [106-131] 117  Heart Rate:  [125-126] 126  Resp:  [20-32] 26  SpO2:  [93 %-97 %] 97 %  I/O last 3 completed shifts:  In: 250.2 [I.V.:250.2]  Out: 1200 [Urine:1200][PK1.2]    Examination:  Well-built well-nourished. In NAD  Heart: Regular rhythm. S1S2, no murmurs, rubs,gallops  Lungs: Clear to auscultation. And no rhonchi rales or wheezing heard.  Abdomen: Soft and nontender. Bowel sounds present.  Extremities: No swelling.  Neuro:A,A,Ox3, motor sensory grossly intact[PK1.1]    Discharge Disposition[PK1.2]   Discharged to long-term care facility  Condition at discharge: Terminal[PK1.1]    Consultations This Hospital Stay   PHARMACY TO DOSE VANCO  VASCULAR ACCESS ADULT IP CONSULT  PALLIATIVE CARE ADULT IP CONSULT  SPIRITUAL HEALTH SERVICES IP CONSULT  PALLIATIVE CARE ADULT IP CONSULT  SOCIAL WORK IP CONSULT    Time Spent on this Encounter[PK1.2]   ISailaja MD, personally saw the patient today and spent less than or equal to 30 minutes discharging this patient.[PK1.1]    Discharge Orders     General info for SNF   Length of Stay Estimate: Short Term Care: Estimated # of Days <30  Condition at Discharge: Terminal  Level of care:board and care  Rehabilitation Potential: Poor  Admission H&P remains valid and up-to-date: Yes  Recent Chemotherapy: N/A  Use Nursing Home Standing Orders: Yes     Mantoux instructions   Give two-step Mantoux (PPD) Per Facility Policy Yes if needed.     Follow Up and recommended labs and tests   Hospice consult.     Activity - Up with  nursing assistance     Special Code (specify)   Comfort care / Hospice to consult.     Fall precautions     Advance Diet as Tolerated   Follow this diet upon discharge: Has been NPO in hospital.       Discharge Medications   Current Discharge Medication List      CONTINUE these medications which have NOT CHANGED    Details   albuterol (2.5 MG/3ML) 0.083% neb solution Take 2.5 mg by nebulization every 4 hours as needed for shortness of breath / dyspnea or wheezing         STOP taking these medications       metFORMIN (GLUCOPHAGE) 500 MG tablet Comments:   Reason for Stopping:         albuterol (PROAIR HFA/PROVENTIL HFA/VENTOLIN HFA) 108 (90 BASE) MCG/ACT Inhaler Comments:   Reason for Stopping:         Acetaminophen (TYLENOL PO) Comments:   Reason for Stopping:         Acetaminophen (TYLENOL PO) Comments:   Reason for Stopping:         Clopidogrel Bisulfate (PLAVIX PO) Comments:   Reason for Stopping:         pyridostigmine (MESTINON) 180 MG CR tablet Comments:   Reason for Stopping:         GABAPENTIN PO Comments:   Reason for Stopping:         PREDNISONE PO Comments:   Reason for Stopping:         GABAPENTIN PO Comments:   Reason for Stopping:             Allergies   Allergies   Allergen Reactions     Asa [Aspirin]      Cephalosporins      Diphenhydramine      Metformin      Penicillins      Strawberry      Pravastatin Itching and Rash     Data[PK1.2]   Most Recent 3 CBC's:[PK1.1]  Recent Labs   Lab Test  02/04/18   0500  02/03/18   1812  02/03/18   1810  01/01/18   0724   WBC  35.7*   --   8.5  9.1   HGB  10.1*  14.3  13.5  11.8*   MCV  97   --   96  96   PLT  251   --   322  317[PK1.2]      Most Recent 3 BMP's:[PK1.1]  Recent Labs   Lab Test  02/04/18   0500  02/04/18   0200  02/03/18   1812 02/03/18   1810   NA  141  141  140  139   POTASSIUM  3.8  3.9  4.3  4.3   CHLORIDE  110*  110*  110*  108   CO2  22  20   --   15*   BUN  48*  47*  41*  48*   CR  2.03*  1.97*   --   1.70*   ANIONGAP  9  11  14  16*   DALTON   7.9*  7.9*   --   9.2   GLC  220*  253*  253*  254*[PK1.2]     Most Recent 2 LFT's:[PK1.1]  Recent Labs   Lab Test  02/03/18   1810  12/29/17   0446   AST  10  14   ALT  11  14   ALKPHOS  340*  78   BILITOTAL  0.8  0.4[PK1.2]     Most Recent INR's and Anticoagulation Dosing History:  Anticoagulation Dose History     Recent Dosing and Labs Latest Ref Rng & Units 12/29/2017 2/3/2018    INR 0.86 - 1.14 1.06 1.19(H)        Most Recent 3 Troponin's:[PK1.1]  Recent Labs   Lab Test  12/29/17   0446  08/06/16   0546  08/06/16   0230   TROPI  0.037  0.033  0.028[PK1.2]     Most Recent Cholesterol Panel:[PK1.1]  Recent Labs   Lab Test  08/08/16   0713   CHOL  231*   LDL  148*   HDL  38*   TRIG  227*[PK1.2]     Most Recent 6 Bacteria Isolates From Any Culture (See EPIC Reports for Culture Details):[PK1.1]  Recent Labs   Lab Test  02/04/18   0005  02/03/18   1920  02/03/18   1815  02/03/18   1810  12/29/17   1030  12/29/17   0528   CULT  Canceled, Test credited  Incorrectly ordered by PCU/Clinic    Charge credited  >100,000 colonies/mL  Escherichia coli  *  >100,000 colonies/mL  Strain 2  Escherichia coli  Piperacillin/Tazobactam susceptibilities in progress  2.6.2018  *  Cultured on the 1st day of incubation:  Escherichia coli  *  Critical Value/Significant Value, preliminary result only, called to and read back by  Jessie LAWSON @ 0928.cg 02/04/18    (Note)  POSITIVE for E.COLI by [facility] Comigene multiplex nucleic acid test. Final  identification and antimicrobial susceptibility testing will be  verified by standard methods. Verigene test will not distinguish  E.coli from Shigella species including S.dysenteriae, S.flexneri,  S.boydii, and S.sonnei. Specimens containing Shigella species or  E.coli will be reported as Positive for E.coli.    Specimen tested with Verigene multiplex, gram-negative blood culture  nucleic acid test for the following targets: Acinetobacter sp.,  Citrobacter sp., Enterobacter sp., Proteus  sp., E. coli, K.  pneumoniae/oxytoca, P. aeruginosa, and the following resistance  markers: CTXM, KPC, NDM, VIM, IMP and OXA.    Critical Value/Significant Value called to and read back by Jessie Block RN RHREGLAU @ 1156.cg 02/04/18    Cultured on the 1st day of incubation:  Escherichia coli  Susceptibility testing done on previous specimen  *  Critical Value/Significant Value, preliminary result only, called to and read back by  Jessie Block RN REGLAU @ 1005. 02/04/18    50,000 to 100,000 colonies/mL  Escherichia coli  *  No growth[PK1.2]     Most Recent TSH, T4 and A1c Labs:[PK1.1]  Recent Labs   Lab Test  12/29/17   0446   08/05/16 2016   TSH   --    --   2.67   A1C  8.4*   < >   --     < > = values in this interval not displayed.     Results for orders placed or performed during the hospital encounter of 02/03/18   Chest  XR, 1 view PORTABLE    Narrative    CHEST PORTABLE ONE VIEW    2/3/2018 6:28 PM     HISTORY: Septic/respiratory distress.      COMPARISON: Chest x-ray 12/29/2017.      Impression    IMPRESSION: New patchy opacities medial left lung base that could be  atelectasis or airspace disease. Right lung is clear. Normal cardiac  silhouette.     DOV LINK MD   XR Chest Port 1 View    Narrative    XR CHEST PORT 1 VIEW   2/4/2018 12:30 AM     HISTORY: RN placed PICC - verify tip placement.     COMPARISON: 2/3/2018.    FINDINGS: Upright portable chest. A right PICC has been placed and the  tip is in the distal SVC in good position. No pneumothorax. The heart  size is normal. There is left basilar infiltrate. The lungs are  otherwise clear.      Impression    IMPRESSION: Right PICC to distal SVC.    TOMY HERNANDEZ MD[PK1.2]          Revision History        User Key Date/Time User Provider Type Action    > PK1.2 2/6/2018 10:44 AM Sailaja Johnson MD Physician Sign     PK1.1 2/6/2018 10:35 AM Sailaja Johnson MD Physician                      Consult Notes      Consults by  "Anabelle Irby LICSW at 2/5/2018  4:09 PM     Author:  Anabelle Irby LICSW Service:  (none) Author Type:      Filed:  2/5/2018  4:09 PM Date of Service:  2/5/2018  4:09 PM Creation Time:  2/5/2018  4:03 PM    Status:  Signed :  Anabelle Irby LICSW ()     Consult Orders:    1. Social Work IP Consult [[id number]] ordered by Maria Elena Mai APRN CNS at 02/05/18 1254                D:  SW responding to MD consult.  I/A: Spoke with pt's wife Yu via phone.  She is interested in hospice services for \"Brant\" and would like the meeting to take place at [facility] when pt is discharged.  Hospice will contact Yu to schedule a meeting.  P: SW continues to be available.[DK1.1]       Revision History        User Key Date/Time User Provider Type Action    > DK1.1 2/5/2018  4:09 PM Anabelle Irby LICSW  Sign            Consults by Maria Elena Mai APRN CNS at 2/5/2018 11:07 AM     Author:  Maria Elena Mai APRN CNS Service:  Palliative Author Type:  Clinical Nurse Specialist    Filed:  2/5/2018  1:32 PM Date of Service:  2/5/2018 11:07 AM Creation Time:  2/5/2018 10:24 AM    Status:  Addendum :  Maria Elena Mai APRN CNS (Clinical Nurse Specialist)         [facility]    Palliative Care Consultation   Text Page    Date of Admission:  2/3/2018[JW1.1]    Assessment & Plan[JW1.2]   Mariano Clemente is a 86 year old male who was admitted on 2/3/2018. I was asked to see the patient for symptom management and support.    Recommendations:  1.[JW1.1] Dyspnea - rubinol 0.2-0.4mg IV q 4 hours. Atropine 1-2 gtts q 1 hour prn secretions. Hydromorphone 1-2mg SL or 0.3-0.5 mg IV q 2 hours prn dyspnea or respirations greater than 20 per minute or pain.[JW1.3] Lorazepam 0.5-1mg SL or 0.5 mg IV q 4 hours prn anxiety or agitation.[JW1.4]  Fan at bedside. Position for comfort.[JW1.3]    2.[JW1.1] Pain - Tylenol 650mg IL q 6 hours. Hydromorphone 1-2 mg SL or " "0.3-0.5 mg IV q 2 hours prn dyspnea or respirations greater than 20 per minute or pain. Position for comfort. Appreciate to offer essential oils, TV for old movies.[JW1.3]    Goal of Care: DNR DNI, Comfort care.[JW1.1] Pt does not demonstrate decision-making capacity. Wife Porsha is next of kin. No HCD. Jhon and Brant are POA's but not for health care. Wife hopes pt will die here. If he survives she would like him to go back to [facility] with Hospice support.[JW1.3]    Disease Process/es & Symptoms:  Mariano Clemente is a 86 year old patient admitted with symptoms of altered mental status and fever. He has been treated for sepsis, acute hypoxic respiratory failure with L PNA, history of myesthenia gravis on chronic prednisone, acute renal failure, lactic acidosis, DM2..      This is in the setting of myasthenia gravis, increasing needs for assistance with ADLS, incontinence, hx of CVA, HTN, hyperlipidemia.  He has been hospitalized 2 times in the past 2 months for recurrent altered mental status and acute hypoxic respiratory failure due to infection. Patient has moved to inpatient care from LTC for higher level of care and needs help with at least one ADL. There is a documented unitentional weight loss of 91 pounds over the past 6 months.      Psychosocial/Spiritual Needs:[JW1.1]  Pt was raised Orthodoxy. He has been attending [employer] with his wife.  Oriented to Spiritual Health and Social Work Services as part of Palliative Care team.  is following. Consultation placed for  to follow.[JW1.3]    Decision-Making & Goals of Care:  Discussion/counseling today about goals of care/decisions:[JW1.1]   2/5/18 called wife who is home ill today. Introduced myself and explained palliative care.  Porsha tells me she spoke for a long time with the doctor yesterday and understands how sick her  is.  \"I don't want to pump him full of fluids and do a whole bunch of things to him, only for " "him to live for a month\" \"If he doesn't know where he is . . .want him to be kept comfortable\". She explains that she and Brant have been  for 45 years. He had 3 kids and she has 4. She tells me that his kids have been angry with her and blame her for putting him in the NH at [facility] last year. \"I can't put him in a NH, the doctor has to and did\". \"They won't talk to me\". \"they are all in [place] and live there, except for Brant who will come back to [place] after the winter\". Her kids are involved and supportive.  She hopes to keep him comfortable. If he survives, she would want him to return to [facility] with hospice care. She told [facility] she wanted them to hold his semiprivate room there \"and the nurses love him\". She notes that pt loves to read, but has noticed that he has had the same book open to the same place for several weeks, and the nursing staff told her he has not been eating for a few days prior to pt.s admission. She was surprised when I told her his weight is down approximately 90# from last summer when he saw his primary care provider.[JW1.3]     Patient has decision-making capacity[JW1.1] Unreliable[JW1.3]  Patient has[JW1.1] no known legal document designating a decision maker. Per policy next of kin is the designated decision maker. See System Informed Consent policy for guidance.  Wife Porsha says there is a POA, but not a HCD or HCPOA.[JW1.3]  Name:[JW1.1] Porsha Clemente[JW1.3], Relationship:[JW1.1] spouse    See ACP tab in pt's medical record for contact information.[JW1.3]    Patient has a completed health care directive available in the chart (Y/N):[JW1.1] N  Physician orders for life-sustaining treatment (POLST) form is on file but needs to be updated if pt discharges.[JW1.3]  Code Status:[JW1.1] Do not resuscitate / Do not intubate[JW1.3]     Findings & plan of care discussed with:[JW1.1] Dr. Rdz, Bedside nurse, Charge Nurse, [facility]/CC.[JW1.3]  Follow-up plan from palliative " team:[JW1.1] Will continue to support this pt for symptom management and pt and family for goals of care.[JW1.3]  Thank you for involving us in the patient's care.[JW1.1]     Maria Elena Mai[JW1.2] APRN, CNS[JW1.3]  Pain Management and Palliative Care  [facility]  Pgr: [phone][JW1.1]    Time Spent on this Encounter[JW1.2]   I spent[JW1.1] 25[JW1.3] minutes in assessment of the patient. Another[JW1.1] 45[JW1.3] minutes in review of chart, documentation and discussion with the health care team.[JW1.1]    Phone call to wife Porsha for consult and support 11:30-12:15PM.[JW1.3]    Reason for Consult[JW1.2]   Reason for consult: I was asked by[JW1.1] Dr. Joy[JW1.3] to evaluate this patient for[JW1.1] Symptom management  Goals of care  Decisional support.[JW1.3]    Primary Care Physician   [facility]    Chief Complaint[JW1.2]   Altered mental status and fever.    History is obtained from the electronic health record[JW1.1]    History of Present Illness[JW1.2]   Mariano Clemente is a 86 year old male who presents with low oxygen and fever from his nursing care facility. History of myasthenia gravis. Recent influenza A and respiratory failure 1 month ago. In ED he was found to be hypoxic with chest xray with L lung infiltrate concerning for PNA. He also had a lactic acid of 8.1 so was started on the sepsis protocol. He was placed on Bipap and transferred to ICU.[JW1.1]     When I see pt, he has been made comfort care and taken off bipap in ICU. He stabilized and was transferred to 5th floor under comfort care. No family is present when I see him.[JW1.3]    Past Medical History[JW1.2]   I have reviewed this patient's medical history and updated it with pertinent information if needed.[JW1.1]   Past Medical History:   Diagnosis Date     Diabetes (H)      Myasthenia gravis (H)[JW1.5]    CVA  HTN[JW1.1]    Past Surgical History[JW1.2]   I have reviewed this patient's surgical history and  updated it with pertinent information if needed.[JW1.1]  History reviewed. No pertinent surgical history.[JW1.6]   Jaw surgery, Knee surgery, Laproscopic hernia repair[JW1.1]    Prior to Admission Medications   Prior to Admission Medications   Prescriptions Last Dose Informant Patient Reported? Taking?   Acetaminophen (TYLENOL PO) 2/3/2018 at 0800  Yes Yes   Sig: Take 1,000 mg by mouth every 8 hours   Acetaminophen (TYLENOL PO) 2/3/2018 at Unknown time  Yes Yes   Sig: Take 1,000 mg by mouth daily as needed for mild pain or fever   Clopidogrel Bisulfate (PLAVIX PO) 2/3/2018 at 0800 Daughter Yes Yes   Sig: Take 75 mg by mouth daily    GABAPENTIN PO 2/3/2018 at 0800 Daughter Yes Yes   Sig: Take 600 mg by mouth daily    GABAPENTIN PO 2/2/2018 at 2000 Daughter Yes Yes   Sig: Take 300 mg by mouth At Bedtime   PREDNISONE PO 2/3/2018 at 0800 Daughter Yes Yes   Sig: Take 5 mg by mouth daily    albuterol (2.5 MG/3ML) 0.083% neb solution   Yes Yes   Sig: Take 2.5 mg by nebulization every 4 hours as needed for shortness of breath / dyspnea or wheezing   albuterol (PROAIR HFA/PROVENTIL HFA/VENTOLIN HFA) 108 (90 BASE) MCG/ACT Inhaler   Yes Yes   Sig: Inhale 2 puffs into the lungs every 4 hours as needed for shortness of breath / dyspnea or wheezing   metFORMIN (GLUCOPHAGE) 500 MG tablet 2/3/2018 at 1700  No Yes   Sig: Take 1 tablet (500 mg) by mouth 2 times daily (with meals)   pyridostigmine (MESTINON) 180 MG CR tablet 2/3/2018 at 0800 Daughter Yes Yes   Sig: Take 180 mg by mouth 2 times daily       Facility-Administered Medications: None     Allergies   Allergies   Allergen Reactions     Asa [Aspirin]      Cephalosporins      Diphenhydramine      Metformin      Penicillins      Strawberry      Pravastatin Itching and Rash       Social History[JW1.2]   I have updated and reviewed the following Social History Narrative:[JW1.1]   Social History     Social History Narrative[JW1.2]      Living situation:[JW1.1] Has lived at  [facility] since last summer due to i[JW1.7]ncreased care needs.[JW1.3]  Family system:[JW1.1] Wife Porsha. Pt has 3 adult sons and she has 4 adult children.[JW1.3]  Functional status (needs help with ADLs or IADLs):[JW1.1] Pt has been living at [facility] in semiprivate room since last summer.  Empl[JW1.3]oyment/education:[JW1.1] / for Insurance.[JW1.3]  Use of community resources:[JW1.1] AA meetings.[JW1.3]  Activities/interests:[JW1.1] Loves to read, especially westerns lately.[JW1.3]  History of substance use/abuse:[JW1.1] Former smoker. Attends AA meetings.[JW1.3]  Episcopalian affiliation:[JW1.1] See above.[JW1.3]  Involvement in dominic community:[JW1.1] none.[JW1.3]  Impact of illness on patient:[JW1.1] Pt is gravely ill and not expected to survive. He has lost significant weight since last summer, and is requiring more help with ADLS.[JW1.3]    Family History[JW1.2]   I have reviewed this patient's family history and updated it with pertinent information if needed.[JW1.1]   No family history on file.[JW1.6]    Review of Systems[JW1.2]   Review of systems not obtained due to patient factors - mental status[JW1.7]    Palliative Symptom Review (0=no symptom/no concern, 1=mild, 2=moderate, 3=severe):[JW1.1]    Unable to obtain palliative symptom review due to pt mental status[JW1.7]    Physical Exam   Temp:  [100.6  F (38.1  C)-103.1  F (39.5  C)] 102  F (38.9  C)  Heart Rate:  [] 82  Resp:  [17-30] 24  BP: ()/() 97/60  FiO2 (%):  [35 %] 35 %  SpO2:  [81 %-97 %] 97 %  177 lbs 14.58 oz[JW1.2]  GEN:[JW1.1]  Drowsy, opens eyes to voice[JW1.7],[JW1.1] SHOBHA orientation[JW1.7], appears comfortable, NAD.  HEENT:  Normocephalic/atraumatic, no scleral icterus, no nasal discharge, mouth[JW1.1] dry[JW1.7].  CV:  RRR[JW1.1] 90 bpm[JW1.7], S1, S2; no murmurs or other irregularities noted.  +3 DP/PT pulses bilatererally; no edema BLE.  RESP:[JW1.1]  RR 24/min, irregular. RH anteriorly  to[JW1.7] auscultation bilaterally.  Symmetric chest rise on inhalation noted.[JW1.1]  Sl labored[JW1.7] respiratory effort.[JW1.1] Loose, weak cough.[JW1.7]  ABD:  Rounded, soft, non-tender/non-distended.  +BS  EXT:  Edema & pulses as noted above.  CMS intact x 4.     M/S:[JW1.1]   Non-[JW1.7]Tender to palpation    SKIN:  Dry to touch,[JW1.1] bruising on arms[JW1.7].    NEURO: Symmetric strength +5/5.[JW1.1] Squeezes hands and moves toes to command.[JW1.7] Sensation to touch intact all extremities.   There is no area of allodynia or hyperesthesia.  Psych:[JW1.1]  Somulent[JW1.7].  Calm, cooperative,[JW1.1] non-verbal, tries to whisper[JW1.7].     Delirium Screen/CAM:[JW1.1]  SHOBHA.[JW1.7]    Data   No results found for this or any previous visit (from the past 24 hour(s)).[JW1.2]  Order    XR Chest Port 1 View [[id number]] (Order [id number])         Exam Information      Exam Date Exam Time Accession # Performing Department Results      2/4/18 12:30 AM [id number] [facility] Radiology        [employer] Interactive Report and InfoRx      View the interactive report       PACS Images      Show images for XR Chest Port 1 View       Study Result      XR CHEST PORT 1 VIEW   2/4/2018 12:30 AM      HISTORY: RN placed PICC - verify tip placement.      COMPARISON: 2/3/2018.     FINDINGS: Upright portable chest. A right PICC has been placed and the  tip is in the distal SVC in good position. No pneumothorax. The heart  size is normal. There is left basilar infiltrate. The lungs are  otherwise clear.         IMPRESSION: Right PICC to distal SVC.     TOMY HERNANDEZ MD       [JW1.1]       Revision History        User Key Date/Time User Provider Type Action    > JW1.4 2/5/2018  1:32 PM Maria Elena Mai APRN CNS Clinical Nurse Specialist Addend     JW1.3 2/5/2018  1:22 PM Maria Elena Mai APRN CNS Clinical Nurse Specialist Sign     JW1.7 2/5/2018 11:33 AM Maria Elena Mai APRN CNS Clinical Nurse Specialist       JW1.6 2/5/2018 10:31 AM Maria Elena Mai APRN CNS Clinical Nurse Specialist      JW1.5 2/5/2018 10:28 AM Maria Elena Mai APRN CNS Clinical Nurse Specialist      JW1.2 2/5/2018 10:25 AM Maria Elena Mai APRN CNS Clinical Nurse Specialist      JW1.1 2/5/2018 10:24 AM Maria Elena Mai APRN CNS Clinical Nurse Specialist                      Progress Notes - Physician (Notes from 02/03/18 through 02/06/18)      Progress Notes by Ivette Perez RN at 2/6/2018 10:05 AM     Author:  Ivette Perez RN Service:  (none) Author Type:      Filed:  2/6/2018 12:37 PM Date of Service:  2/6/2018 10:05 AM Creation Time:  2/6/2018 10:05 AM    Status:  Addendum :  Ivette Perez RN ()         I talked with Paul wife about dc planning[RB1.1].  She was surprised he was ready to dc today.  She is agreeable to have him dc back to [facility] L[facility] with  Hospice.  Wife prefers to meet hospice at [facility].  I called Erica from Hospice intake and she will connect with wife to sign him on.  The wife said she is very busy on wed, but Thursday may work to sign him on.  I encouraged her to do Wednesday and I also updated Erica from hospice.  Palliative will fill medications bubble wrap and no ranges.  [facility] stretcher set up for today at 1400. Wife aware of private cost.   I notified [facility] of pt's return for today with hospice support.  Vanessa AVENDAÑO CTS 2170[RB1.2]    Dc orders sent to [facility] ltc. Wife updated on transport time.[RB1.3]      Erica from hospice will meet at [address] [facility].[RB1.4]      Revision History        User Key Date/Time User Provider Type Action    > RB1.4 2/6/2018 12:37 PM Ivette Perez RN Case Manager Addend     [N/A] 2/6/2018 11:05 AM Ivette Perez RN Case Manager Addend     RB1.3 2/6/2018 11:02 AM Ivette Perez RN Case Manager Addend     RB1.2 2/6/2018 10:16 AM Ivette Perez RN Case Manager Sign     RB1.1 2/6/2018 10:05 AM Ivette Perez RN               Progress Notes by Maria Elena Mai APRN CNS at 2/6/2018  8:54 AM     Author:  Maria Elena Mai APRN CNS Service:  Palliative Author Type:  Clinical Nurse Specialist    Filed:  2/6/2018 11:19 AM Date of Service:  2/6/2018  8:54 AM Creation Time:  2/6/2018  8:54 AM    Status:  Addendum :  Maria Elena Mai APRN CNS (Clinical Nurse Specialist)         [facility]  Palliative Care Progress Note  Text Page     Assessment & Plan   Mariano Clemente is a 86 year old male who was admitted on 2/3/2018. I was asked to see the patient for symptom management and support.     Recommendations:  1. Dyspnea - rubinol 0.2-0.4mg IV q 4 hours. Atropine 1-2 gtts q 1 hour prn secretions. Hydromorphone 2 mg SL q 4 hours and 2 hours prn dyspnea or respirations greater than 20 per minute or pain. Lorazepam 0.5-1 mg SL  q 4 hours.  Fan at bedside. Position for comfort.    2. Pain - Tylenol 650 mg NM q 6 hours. Hydromorphone 2 mg SL q 4 hours and q 2 hours prn dyspnea or respirations greater than 20 per minute or pain. Position for comfort. Appreciate to offer essential oils, TV for old movies.     Goal of Care: DNR DNI, Comfort care. Pt does not demonstrate decision-making capacity. Wife Porsha is next of kin. No HCD. Jhon and Brant are POA's but not for health care.  Pt will go back to [facility] with Hospice support between 2 and 3pm today.  Will complete POLST and order comfort meds today.     Disease Process/es & Symptoms:  Mariano Clemente is a 86 year old patient admitted with symptoms of altered mental status and fever. He has been treated for sepsis, acute hypoxic respiratory failure with L PNA, history of myesthenia gravis on chronic prednisone, acute renal failure, lactic acidosis, DM2..       This is in the setting of myasthenia gravis, increasing needs for assistance with ADLS, incontinence, hx of CVA, HTN, hyperlipidemia.  He has been hospitalized 2 times in the past 2 months for  "recurrent altered mental status and acute hypoxic respiratory failure due to infection. Patient has moved to inpatient care from LTC for higher level of care and needs help with at least one ADL. There is a documented unitentional weight loss of 91 pounds over the past 6 months.        Psychosocial/Spiritual Needs:  Pt was raised Sikh. He has been attending [employer] with his wife.  Oriented to Spiritual Health and Social Work Services as part of Palliative Care team.  is following. Consultation placed for  to follow.     Decision-Making & Goals of Care:  Discussion/counseling today about goals of care/decisions:[JW1.1]   2/6/2018 called to wife. She is aware that pt is moving back to [facility] and is waiting to confirm time of hospice meeting with her family tomorrow. She is agreeable to current plan of comfort care. Explained medications and interventions in place to help with his symptoms and she is agreeable to this plan. She will be coming to the hospital with her granddaughter before pt discharges. She is aware that I will review POLST with her prior to his d/c. Appreciate SAY Mendez and [facility] assistance to coordinate discharge today.   2/5[JW1.2]/18 called wife who is home ill today. Introduced myself and explained palliative care.  Porsha tells me she spoke for a long time with the doctor yesterday and understands how sick her  is.  \"I don't want to pump him full of fluids and do a whole bunch of things to him, only for him to live for a month\" \"If he doesn't know where he is . . .want him to be kept comfortable\". She explains that she and Brant have been  for 45 years. He had 3 kids and she has 4. She tells me that his kids have been angry with her and blame her for putting him in the NH at [facility] last year. \"I can't put him in a NH, the doctor has to and did\". \"They won't talk to me\". \"they are all in [place] and live there, except for Brant who will come " "back to [place] after the winter\". Her kids are involved and supportive.  She hopes to keep him comfortable. If he survives, she would want him to return to [facility] with hospice care. She told [facility] she wanted them to hold his semiprivate room there \"and the nurses love him\". She notes that pt loves to read, but has noticed that he has had the same book open to the same place for several weeks, and the nursing staff told her he has not been eating for a few days prior to pt.s admission. She was surprised when I told her his weight is down approximately 90# from last summer when he saw his primary care provider.      Patient has decision-making capacity Unreliable  Patient has no known legal document designating a decision maker. Per policy next of kin is the designated decision maker. See System Informed Consent policy for guidance.  Wife Porsha says there is a POA, but not a HCD or HCPOA.  Name: Porsha Clemente, Relationship: spouse     See ACP tab in pt's medical record for contact information.     Patient has a completed health care directive available in the chart (Y/N): N  Physician orders for life-sustaining treatment (POLST) form is on file but needs to be updated if pt discharges.  Code Status:                     Do not resuscitate / Do not intubate      Findings & plan of care discussed with: [JW1.1] Elizabeth[JW1.2], Bedside nurse, Charge Nurse, SWS/CC.  Follow-up plan from palliative team: Will continue to support this pt for symptom management and pt and family for goals of care.  Thank you for involving us in the patient's care.       Maria Elena VIEYRA, CNS  Pain Management and Palliative Care  [facility]  Pgr: [phone]    Time Spent on this Encounter   I spent  30 minutes in assessment of the patient and discussion with the patient and family. Another 40 minutes in review of chart, documentation and discussion with the health care team.    Interval History   Reviewed chart. " Opens eyes spontaneously and to voice. Does not follow commands. Still making urine. Tachycardia. Tachypnea. Pt is responding to interventions including medications for symptoms. Will schedule them where appropriate to help maximize pt comfort as he is not able to ask and family is not always present.    Review of Systems    Review of systems not obtained due to patient factors - mental status     Palliative Symptom Review (0=no symptom/no concern, 1=mild, 2=moderate, 3=severe):    Unable to obtain palliative symptom review due to pt mental status    Physical Exam   Pulse:  [106-131] 117  Heart Rate:  [] 125  Resp:  [20-32] 27  SpO2:  [93 %-97 %] 96 %  177 lbs 14.58 oz    GEN:  Drowsy, opens eyes to voice, SHOBHA orientation, intermittant moments of restlessness, NAD.  HEENT:  Normocephalic/atraumatic, no scleral icterus, no nasal discharge, mouth dry.  CV:  Tachy, regular, S1, S2; no murmurs or other irregularities noted.  +3 DP/PT pulses bilatererally; +1 edema BLE.  RESP:  RR 30/min, irregular. Occasional anteriorly to auscultation bilaterally.  Symmetric chest rise on inhalation noted.  Sl labored respiratory effort. Less cough noted.  ABD:  Rounded, soft, non-tender/non-distended.  +BS  EXT:  Edema & pulses as noted above.  CMS intact x 4.     M/S:   Non-Tender to palpation    SKIN:  Dry to touch, bruising on arms.    NEURO: Symmetric strength +5/5. ACOSTA but not to command.   Psych:  Somulent.  Moments of restlessness, otherwise calm, non-verbal.      Delirium Screen/CAM:  SHOBHA.    Medications     NaCl 10 mL/hr at 02/05/18 1829       glycopyrrolate  0.2-0.4 mg Intravenous Q4H     acetaminophen  650 mg Rectal Q6H     sodium chloride (PF)  10 mL Intracatheter Q7 Days     pyridostigmine  60 mg Per Feeding Tube Q4H ASCENCION     methylPREDNISolone  10 mg Intravenous Q24H       Data   Results for orders placed or performed during the hospital encounter of 02/03/18 (from the past 24 hour(s))   Social Work IP Consult     "Narrative    Anabelle Irby, LICSW     2/5/2018  4:09 PM  D:  RAY responding to MD consult.  I/A: Spoke with pt's wife Yu via phone.  She is interested   in hospice services for \"Brant\" and would like the meeting to take   place at [facility] when pt is discharged.  Hospice will contact   Yu to schedule a meeting.  P: SW continues to be available.[JW1.1]          Revision History        User Key Date/Time User Provider Type Action    > JW1.2 2/6/2018 11:19 AM Maria Elena Mai APRN CNS Clinical Nurse Specialist Addend     JW1.1 2/6/2018 10:13 AM Maria Elena Mai APRN CNS Clinical Nurse Specialist Sign            Progress Notes by Santy Rdz MD at 2/5/2018  2:39 PM     Author:  Santy Rdz MD Service:  Hospitalist Author Type:  Physician    Filed:  2/5/2018  2:53 PM Date of Service:  2/5/2018  2:39 PM Creation Time:  2/5/2018  2:39 PM    Status:  Signed :  Santy Rdz MD (Physician)         [facility]  Hospitalist Progress Note  Patient Name: Mariano Clemente    MRN: [id number]  Provider: Santy Rdz MD  02/05/18    Initial presenting complaint/issue to hospital (Diagnosis): fever, change in mental status, and hypoxia         Assessment and Plan:      Summary of Stay: Mariano Clemente is an 86 year old male with history of myasthenia gravis and type 2 diabetes.  He presented to the ED on 2/3/2018 with fever, increased tremor, hypoxia and change in mental status.  He was found to have fever with temp of 101.8, tachycardia with HR of 124, hypoxia with SaO2 of 70%, renal failure with BUN of 41 and creatinine of 2, and lactic acid of 8.1.  Left sided pneumonia was noted on chest x-ray.  Procalcitonin was checked and later came back elevated at 26.65.  He was admitted to the hospital, treated with meropenem for possible gram-negative pneumonia sepsis, hydrated, and given supplemental oxygen.  In accordance with his previously stated wishes he was made DNR/DNI.  After " admission he declined.  Lactic acidosis did not improve.  To admission, white blood cell count rosana from 8.5-35.7.  Renal function did not improve.  Pro-calcitonin rosana to greater than 200.  Mental status did not improve.  My colleague met with patient's wife and spoke with several family members.  Based on several factors, including Mariano's quality of life, critical illness, and his previously expressed wishes for limited care the decision was made to pursue comfort care but not treat underlying pneumonia and sepsis.  Antibiotics were stopped.  Nonessential medications were stopped.  Positive care was consulted.     Problem List:   1. Severe sepsis with acute hypoxic respiratory failure due to possible gram-negative pneumonia.   2. Acute renal failure due, in part, to sepsis.  3. Severe lactic acidosis.  4. Myasthenia gravis.    5. Type 2 diabetes.    6. Comfort care.       Code Status: DNR / DNI  Disposition: Depends on clinical course.  He is currently comfort care.  Positive care is following.  Possible discharge to hospice Mariano does not pass relatively quickly.        Interval History:      Mariano is not able to divide any history this morning.                  Physical Exam:      Last Vital Signs:  BP 97/60  Pulse 124  Temp 102  F (38.9  C)  Resp 25  Ht 1.829 m (6')  Wt 80.7 kg (177 lb 14.6 oz)  SpO2 97%  BMI 24.13 kg/m2[DS1.1]    Intake/Output Summary (Last 24 hours) at 02/05/18 1453  Last data filed at 02/05/18 1321   Gross per 24 hour   Intake            200.2 ml   Output              650 ml   Net           -449.8 ml[DS1.2]       GENERAL:  Comfortable appearing.  Nonverbal.  Minimally responsive.  PSYCH:  No acute distress.  EYES: PERRLA, Normal conjunctiva.  HEART:  Regular rate and rhythm. No JVD. Pulses normal. No edema.  LUNGS: Coarse breath sounds bilaterally to auscultation, likely labored respiratory effort.  ABDOMEN:  Soft, no hepatosplenomegaly, normal bowel sounds.  EXTREMETIES: No clubbing,  cyanosis or ischemia  SKIN:  Dry to touch, No rash.           Medications:      All current medications were reviewed.         Data:      All new lab and imaging data was reviewed.   Labs:[DS1.1]    Recent Labs  Lab 02/04/18  0005 02/03/18  1920 02/03/18  1815 02/03/18  1810   CULT Canceled, Test creditedIncorrectly ordered by PCU/Clinic  Charge credited >100,000 colonies/mLEscherichia coliSusceptibility testing in progress*  >100,000 colonies/mLStrain 2Escherichia coliSusceptibility testing in progress* Cultured on the 1st day of incubation:Escherichia coli*  Critical Value/Significant Value, preliminary result only, called to and read back byJessie LAWSON @ 0928. 02/04/18  (Note)POSITIVE for E.COLI by Verigene multiplex nucleic acid test. Finalidentification and antimicrobial susceptibility testing will beverified by standard methods. Verigene test will not distinguishE.coli from Shigella species including S.dysenteriae, S.flexneri,S.boydii, and S.sonnei. Specimens containing Shigella species orE.coli will be reported as Positive for E.coli.Specimen tested with Verigene multiplex, gram-negative blood culturenucleic acid test for the following targets: Acinetobacter sp.,Citrobacter sp., Enterobacter sp., Proteus sp., E. coli, K.pneumoniae/oxytoca, P. aeruginosa, and the following resistancemarkers: CTXM, KPC, NDM, VIM, IMP and OXA.Critical Value/Significant Value called to and read back by Gaby AVENDAÑO ISAIAH @ 1156. 02/04/18 Cultured on the 1st day of incubation:Escherichia coli*  Critical Value/Significant Value, preliminary result only, called to and read back byJessie Block RN ISAIAH @ 1005. 02/04/18  Susceptibility testing in progress[DS1.2]          Lab Results   Component Value Date     02/04/2018     02/04/2018     02/03/2018    Lab Results   Component Value Date    CHLORIDE 110 02/04/2018    CHLORIDE 110 02/04/2018    CHLORIDE 110 02/03/2018    Lab Results    Component Value Date    BUN 48 02/04/2018    BUN 47 02/04/2018    BUN 41 02/03/2018      Lab Results   Component Value Date    POTASSIUM 3.8 02/04/2018    POTASSIUM 3.9 02/04/2018    POTASSIUM 4.3 02/03/2018    Lab Results   Component Value Date    CO2 22 02/04/2018    CO2 20 02/04/2018    CO2 15 02/03/2018    Lab Results   Component Value Date    CR 2.03 02/04/2018    CR 1.97 02/04/2018    CR 1.70 02/03/2018[DS1.1]          Recent Labs  Lab 02/04/18  0500 02/03/18  1812 02/03/18  1810   WBC 35.7*  --  8.5   HGB 10.1* 14.3 13.5   HCT 31.9*  --  43.6   MCV 97  --  96     --  322[DS1.2]                 Revision History        User Key Date/Time User Provider Type Action    > DS1.2 2/5/2018  2:53 PM Santy Rdz MD Physician Sign     DS1.1 2/5/2018  2:39 PM Santy Rdz MD Physician             Progress Notes by Manny Joy MD at 2/4/2018  7:09 AM     Author:  Manny Joy MD Service:  Hospitalist Author Type:  Physician    Filed:  2/4/2018  4:36 PM Date of Service:  2/4/2018  7:09 AM Creation Time:  2/4/2018  7:09 AM    Status:  Signed :  Manny Joy MD (Physician)         [facility]  Hospitalist Progress Note  Manny Joy MD 02/04/2018    Reason for Stay (Diagnosis): fever, change in mental status and hypoxia.         Assessment and Plan:      Summary of Stay: Mariano Clemente is a 86 year old male who returned to the ED on 2/3/2018 due to fever, increased tremor, hypoxia and change in mental status.  In the emergency department patient was diagnosed with pneumonia based on the presence of the chest x-ray, hypoxia.  He also was noted to have markedly elevated lactic acid for which he was vigorously fluid resuscitated.  He was initiated on meropenem.  With his history of myasthenia gravis he was increased from 5 to 10 mg daily of prednisone-equivalent steroid.    Mr. Dennen currently resides at [facility].  Prior medical history is significant for  myasthenia gravis as well as stroke, ataxia, type 2 diabetes, hypertension.  He most recently was hospitalized from December 29 to January 3, 2018 also with severe weakness, fever, cough and hypoxia.  Notably he had a urinary tract infection in addition to influenza A diagnosis.  He was also started on metformin at that time.  He was felt to not have had significant exacerbation of his myasthenia gravis.    Problem List:   1. Severe sepsis with acute hypoxic respiratory failure.  Infiltrate on chest x-ray is quite  unimpressive; urinalysis is strongly suggestive of acute infection.[KP1.1]  Newly identified GNR bactermia is thought due to complicated UTI.[KP1.2] Patient[KP1.1] has been[KP1.2] hemodynamically stable[KP1.1] overnight[KP1.2] and being managed[KP1.1] empirically with[KP1.2] meropenem.  2. Myasthenia gravis.  This is in addition to generalized weakness.  3. Acute renal failure probably multifactorial.   4. Anion gap metabolic acidosis with markedly elevated lactic acidosis.  This is probably due to recent initiation of metformin.  He has been aggressively fluid resuscitated having received 5 L of crystalloid in the 6 hours from presenting in the emergency department to midnight.  5. Type 2 diabetes.  Last hemoglobin A1c 12/29/17 was 8.4.      PLAN:[KP1.1]  After reviewing Dr. Ventura's notes I called the patient's wife and further clarified her understanding of the patient's wishes.  After about a 15-20 minute discussion, it became clear that the patient has been suffering significantly.  I indicated to the patient's wife that if she thought it was her 's wish, we could stop further supportive care and do our best to keep him comfortable though this would result in a high likelihood of him dying during this hospitalization.  She indicated that she thought that was most appropriate path.      I did call her son, Demetrius after Jhon could not be reached and confirmed with them that her wishes reflected  what they understood of his wishes.  I then called his son Brant Clemente and left a message to call me back.  To his mother's I was able to reach Demetrius Orozco, the patient's stepson.  After he confirmed that his understanding was similar, that the patient indeed was suffering and did not want to go through more hospitalizations, he indicated that he wanted to come in to be at the bedside prior to withdrawal of support.    Approximately 2 PM I returned to the room and found both Demetrius Orozco and a grandchild of Mr. Clemente, who is here with his wife who is a CRNA.  They asked appropriate questions and fortunately, I believe, all seem to agree that comfort care was the most appropriate approach for this patient.  With this consent I entered orders for comfort care and instructed the nurse to remove the patient's BiPAP.  At the time of my evaluation he appeared quite comfortable.    Antibiotics and nonessential medications including IV fluids have been discontinued.    I have asked for palliative care consultation.  This would be useful in the event that the patient survives through more than a day or two.[KP1.3]        Interval History (Subjective):[KP1.1]      Chart reviewed, pt interviewed.      Initially this morning the patient was unresponsive on BiPAP.  He had not been given any sedative medications at that time.    Note that the laboratory service has called repeatedly now with reports of gram-negative rods in the blood subsequently identified as Escherichia coli.[KP1.3]                  Physical Exam:      Last Vital Signs:  /51  Pulse 124  Temp 99.5  F (37.5  C)  Resp 20  Ht 1.829 m (6')  Wt 80.7 kg (177 lb 14.6 oz)  SpO2 98%  BMI 24.13 kg/m2[KP1.1]    I/O last 3 completed shifts:  In: 6597.25 [I.V.:2597.25; IV Piggyback:4000]  Out: 160 [Urine:160][KP1.4]    Constitutional:[KP1.1]  Unresponsive.  Grimace is evident.  Patient withdraws to noxious stimulus.[KP1.3]   Respiratory: Clear to  auscultation bilaterally, no crackles or wheezing   Cardiovascular: Regular rate and rhythm, normal S1 and S2, and no murmur noted   Abdomen: Normal bowel sounds, soft, non-distended, non-tender   Skin: No rashes, no cyanosis, dry to touch   Neuro:[KP1.1]  Unable.[KP1.3]   Extremities: No edema[KP1.1].  Perfusion is intact.[KP1.3]   Other(s):               Medications:      All current medications were reviewed with changes reflected in problem list.         Data:      All new lab and imaging data was reviewed.   Labs[KP1.1]/Imaging:[KP1.3]  Results for orders placed or performed during the hospital encounter of 02/03/18 (from the past 24 hour(s))   CBC with platelets differential   Result Value Ref Range    WBC 8.5 4.0 - 11.0 10e9/L    RBC Count 4.56 4.4 - 5.9 10e12/L    Hemoglobin 13.5 13.3 - 17.7 g/dL    Hematocrit 43.6 40.0 - 53.0 %    MCV 96 78 - 100 fl    MCH 29.6 26.5 - 33.0 pg    MCHC 31.0 (L) 31.5 - 36.5 g/dL    RDW 13.5 10.0 - 15.0 %    Platelet Count 322 150 - 450 10e9/L    Diff Method Manual Differential     % Neutrophils 82.0 %    % Lymphocytes 11.0 %    % Monocytes 2.0 %    % Eosinophils 0.0 %    % Basophils 1.0 %    % Metamyelocytes 3.0 %    % Myelocytes 1.0 %    Absolute Neutrophil 7.0 1.6 - 8.3 10e9/L    Absolute Lymphocytes 0.9 0.8 - 5.3 10e9/L    Absolute Monocytes 0.2 0.0 - 1.3 10e9/L    Absolute Eosinophils 0.0 0.0 - 0.7 10e9/L    Absolute Basophils 0.1 0.0 - 0.2 10e9/L    Absolute Metamyelocytes 0.3 (H) 0 10e9/L    Absolute Myelocytes 0.1 (H) 0 10e9/L    RBC Morphology Consistent with reported results     Platelet Estimate       Automated count confirmed.  Platelet morphology is normal.   Basic metabolic panel (BMP)   Result Value Ref Range    Sodium 139 133 - 144 mmol/L    Potassium 4.3 3.4 - 5.3 mmol/L    Chloride 108 94 - 109 mmol/L    Carbon Dioxide 15 (L) 20 - 32 mmol/L    Anion Gap 16 (H) 3 - 14 mmol/L    Glucose 254 (H) 70 - 99 mg/dL    Urea Nitrogen 48 (H) 7 - 30 mg/dL    Creatinine  1.70 (H) 0.66 - 1.25 mg/dL    GFR Estimate 38 (L) >60 mL/min/1.7m2    GFR Estimate If Black 46 (L) >60 mL/min/1.7m2    Calcium 9.2 8.5 - 10.1 mg/dL   Blood culture ONE site   Result Value Ref Range    Specimen Description Blood Right Arm     Special Requests Aerobic and anaerobic bottles received     Culture Micro (A)      Cultured on the 1st day of incubation:  Gram negative rods      Culture Micro       Critical Value/Significant Value, preliminary result only, called to and read back by  Jessie Block RN [facility] @ 1005.cg 02/04/18     Partial thromboplastin time   Result Value Ref Range    PTT 34 22 - 37 sec   Hepatic panel   Result Value Ref Range    Bilirubin Direct 0.3 (H) 0.0 - 0.2 mg/dL    Bilirubin Total 0.8 0.2 - 1.3 mg/dL    Albumin 2.5 (L) 3.4 - 5.0 g/dL    Protein Total 7.6 6.8 - 8.8 g/dL    Alkaline Phosphatase 340 (H) 40 - 150 U/L    ALT 11 0 - 70 U/L    AST 10 0 - 45 U/L   INR   Result Value Ref Range    INR 1.19 (H) 0.86 - 1.14   Procalcitonin   Result Value Ref Range    Procalcitonin 26.65 (HH) ng/ml   EKG 12 lead   Result Value Ref Range    Interpretation ECG Click View Image link to view waveform and result    ISTAT Basic Met ICa HCT POCT   Result Value Ref Range    Sodium 140 133 - 144 mmol/L    Potassium 4.3 3.4 - 5.3 mmol/L    Chloride 110 (H) 94 - 109 mmol/L    Total CO2 16 (L) 20 - 32 mmol/L    Anion Gap 14 6 - 17 mmol/L    Glucose 253 (H) 70 - 99 mg/dL    Urea Nitrogen 41 (H) 7 - 30 mg/dL    Creatinine 2.0 (H) 0.66 - 1.25 mg/dL    GFR Estimate 32 (L) >60 mL/min/1.7m2    GFR Estimate If Black 39 (L) >60 mL/min/1.7m2    Calcium Ionized 4.1 (L) 4.4 - 5.2 mg/dL    Hemoglobin 14.3 13.3 - 17.7 g/dL    Hematocrit - POCT 42 40.0 - 53.0 %PCV   ISTAT gases lactate hector POCT   Result Value Ref Range    Ph Venous 7.43 7.32 - 7.43 pH    PCO2 Venous 23 (L) 40 - 50 mm Hg    PO2 Venous 40 25 - 47 mm Hg    Bicarbonate Venous 15 (L) 21 - 28 mmol/L    O2 Sat Venous 78 %    Lactic Acid 8.1 (HH) 0.7 - 2.1  mmol/L   Glucose by meter   Result Value Ref Range    Glucose 265 (H) 70 - 99 mg/dL   Blood culture ONE site   Result Value Ref Range    Specimen Description Blood Left Hand     Special Requests Aerobic and anaerobic bottles received     Culture Micro (A)      Cultured on the 1st day of incubation:  Gram negative rods      Culture Micro       Critical Value/Significant Value, preliminary result only, called to and read back by  Jessie PADILLAU @ 0928. 02/04/18      Culture Micro       (Note)  POSITIVE for E.COLI by Verigene multiplex nucleic acid test. Final  identification and antimicrobial susceptibility testing will be  verified by standard methods. Verigene test will not distinguish  E.coli from Shigella species including S.dysenteriae, S.flexneri,  S.boydii, and S.sonnei. Specimens containing Shigella species or  E.coli will be reported as Positive for E.coli.    Specimen tested with Verigene multiplex, gram-negative blood culture  nucleic acid test for the following targets: Acinetobacter sp.,  Citrobacter sp., Enterobacter sp., Proteus sp., E. coli, K.  pneumoniae/oxytoca, P. aeruginosa, and the following resistance  markers: CTXM, KPC, NDM, VIM, IMP and OXA.    Critical Value/Significant Value called to and read back by Jessie Block RN [facility] @ 1156. 02/04/18     Chest  XR, 1 view PORTABLE    Narrative    CHEST PORTABLE ONE VIEW    2/3/2018 6:28 PM     HISTORY: Septic/respiratory distress.      COMPARISON: Chest x-ray 12/29/2017.      Impression    IMPRESSION: New patchy opacities medial left lung base that could be  atelectasis or airspace disease. Right lung is clear. Normal cardiac  silhouette.     DOV LINK MD   Clostridium difficile toxin B PCR   Result Value Ref Range    Specimen Description Feces     C Diff Toxin B PCR Negative NEG^Negative   UA with Microscopic reflex to Culture   Result Value Ref Range    Color Urine Shante     Appearance Urine Cloudy     Glucose Urine Negative  NEG^Negative mg/dL    Bilirubin Urine Negative NEG^Negative    Ketones Urine Negative NEG^Negative mg/dL    Specific Gravity Urine 1.026 1.003 - 1.035    Blood Urine Large (A) NEG^Negative    pH Urine 5.0 5.0 - 7.0 pH    Protein Albumin Urine 100 (A) NEG^Negative mg/dL    Urobilinogen mg/dL 2.0 0.0 - 2.0 mg/dL    Nitrite Urine Negative NEG^Negative    Leukocyte Esterase Urine Large (A) NEG^Negative    Source Catheterized Urine     WBC Urine >182 (H) 0 - 2 /HPF    RBC Urine >182 (H) 0 - 2 /HPF    WBC Clumps Present (A) NEG^Negative /HPF    Bacteria Urine Many (A) NEG^Negative /HPF    Squamous Epithelial /HPF Urine 1 0 - 1 /HPF    Mucous Urine Present (A) NEG^Negative /LPF    sperm Present (A) NEG^Negative /HPF   Urine Culture Aerobic Bacterial   Result Value Ref Range    Specimen Description Catheterized Urine     Special Requests Specimen received in preservative     Culture Micro Culture in progress    Lactic acid whole blood   Result Value Ref Range    Lactic Acid 9.7 (HH) 0.7 - 2.0 mmol/L   Lactic acid whole blood   Result Value Ref Range    Lactic Acid 8.2 (HH) 0.7 - 2.0 mmol/L   Methicillin Resist/Sens S. aureus PCR   Result Value Ref Range    Specimen Description Nares     Methicillin Resist/Sens S. aureus PCR Negative NEG^Negative   Methicillin resistant staph aureus cult   Result Value Ref Range    Specimen Description Nares     Culture Micro       Canceled, Test credited  Incorrectly ordered by PCU/Clinic      Culture Micro Charge credited    Glucose by meter   Result Value Ref Range    Glucose 258 (H) 70 - 99 mg/dL   XR Chest Port 1 View    Narrative    XR CHEST PORT 1 VIEW   2/4/2018 12:30 AM     HISTORY: RN placed PICC - verify tip placement.     COMPARISON: 2/3/2018.    FINDINGS: Upright portable chest. A right PICC has been placed and the  tip is in the distal SVC in good position. No pneumothorax. The heart  size is normal. There is left basilar infiltrate. The lungs are  otherwise clear.       Impression    IMPRESSION: Right PICC to distal SVC.    TOMY HERNANDEZ MD   Lactic acid   Result Value Ref Range    Lactic Acid 4.6 (HH) 0.4 - 2.0 mmol/L   Basic metabolic panel   Result Value Ref Range    Sodium 141 133 - 144 mmol/L    Potassium 3.9 3.4 - 5.3 mmol/L    Chloride 110 (H) 94 - 109 mmol/L    Carbon Dioxide 20 20 - 32 mmol/L    Anion Gap 11 3 - 14 mmol/L    Glucose 253 (H) 70 - 99 mg/dL    Urea Nitrogen 47 (H) 7 - 30 mg/dL    Creatinine 1.97 (H) 0.66 - 1.25 mg/dL    GFR Estimate 32 (L) >60 mL/min/1.7m2    GFR Estimate If Black 39 (L) >60 mL/min/1.7m2    Calcium 7.9 (L) 8.5 - 10.1 mg/dL   Glucose by meter   Result Value Ref Range    Glucose 222 (H) 70 - 99 mg/dL   CBC with platelets   Result Value Ref Range    WBC 35.7 (H) 4.0 - 11.0 10e9/L    RBC Count 3.30 (L) 4.4 - 5.9 10e12/L    Hemoglobin 10.1 (L) 13.3 - 17.7 g/dL    Hematocrit 31.9 (L) 40.0 - 53.0 %    MCV 97 78 - 100 fl    MCH 30.6 26.5 - 33.0 pg    MCHC 31.7 31.5 - 36.5 g/dL    RDW 13.9 10.0 - 15.0 %    Platelet Count 251 150 - 450 10e9/L   Magnesium   Result Value Ref Range    Magnesium 1.4 (L) 1.6 - 2.3 mg/dL   Phosphorus   Result Value Ref Range    Phosphorus 3.6 2.5 - 4.5 mg/dL   Procalcitonin   Result Value Ref Range    Procalcitonin >200.00 (HH) ng/ml   Lactic acid   Result Value Ref Range    Lactic Acid 3.9 (H) 0.4 - 2.0 mmol/L   Basic metabolic panel   Result Value Ref Range    Sodium 141 133 - 144 mmol/L    Potassium 3.8 3.4 - 5.3 mmol/L    Chloride 110 (H) 94 - 109 mmol/L    Carbon Dioxide 22 20 - 32 mmol/L    Anion Gap 9 3 - 14 mmol/L    Glucose 220 (H) 70 - 99 mg/dL    Urea Nitrogen 48 (H) 7 - 30 mg/dL    Creatinine 2.03 (H) 0.66 - 1.25 mg/dL    GFR Estimate 31 (L) >60 mL/min/1.7m2    GFR Estimate If Black 38 (L) >60 mL/min/1.7m2    Calcium 7.9 (L) 8.5 - 10.1 mg/dL   D dimer quantitative   Result Value Ref Range    D Dimer 4.2 (H) 0.0 - 0.50 ug/ml FEU   Partial thromboplastin time   Result Value Ref Range    PTT 45 (H) 22 - 37 sec    Glucose by meter   Result Value Ref Range    Glucose 195 (H) 70 - 99 mg/dL   Glucose by meter   Result Value Ref Range    Glucose 196 (H) 70 - 99 mg/dL[KP1.4]          Revision History        User Key Date/Time User Provider Type Action    > KP1.4 2/4/2018  4:36 PM Manny Joy MD Physician Sign     KP1.3 2/4/2018  4:21 PM Manny Joy MD Physician      KP1.2 2/4/2018 10:10 AM Manny Joy MD Physician      KP1.1 2/4/2018  7:09 AM Manny Joy MD Physician             Progress Notes by Kelley Soliz at 2/4/2018  2:40 PM     Author:  Kelley Soliz Service:  (none) Author Type:      Filed:  2/4/2018  2:43 PM Date of Service:  2/4/2018  2:40 PM Creation Time:  2/4/2018  2:40 PM    Status:  Signed :  Kelley Soliz ()         [facility] Progress Note  Former[facility] 361 ICU   Patient is DNR/DNI and is going to  Be taken off oxygen that he is presently on.  Staff waiting for all family to be present.  Ichecked in with them periodically today and offered a prayer of comfort, and told the family that I will be available if they would like me to come back later today. Visit was appreciated.        Kelley Soliz  Chaplain Resident  [phone][SK1.1]       Revision History        User Key Date/Time User Provider Type Action    > SK1.1 2/4/2018  2:43 PM Kelley Soliz  Sign            Progress Notes by Skylar Block RN at 2/4/2018 11:30 AM     Author:  Skylar Block RN Service:  (none) Author Type:  Registered Nurse    Filed:  2/4/2018 12:04 PM Date of Service:  2/4/2018 11:30 AM Creation Time:  2/4/2018 12:03 PM    Status:  Signed :  Skylar Block RN (Registered Nurse)         Procalcitonin resulted > 200, results given to Dr. Joy. Pt is to be transitioned to comfort cares in setting of acute sepsis. see flowsheet for additional assessment information.[KK1.1]     Revision History        User Key Date/Time User Provider Type Action    >  KK1.1 2/4/2018 12:04 PM Skylar Block RN Registered Nurse Sign            Progress Notes by Skylar Block RN at 2/4/2018  8:00 AM     Author:  Skylar Block RN Service:  (none) Author Type:  Registered Nurse    Filed:  2/4/2018 12:02 PM Date of Service:  2/4/2018  8:00 AM Creation Time:  2/4/2018 12:01 PM    Status:  Signed :  Skylar Block RN (Registered Nurse)         Critical result of Blood cultures from left hand on 2/3/18 at 1815 resulted gram negative rods.  Critical results of blood cultures from 2/3/18 on right hands resulted gram negative rods.  Results read back to  and given to Dr. Joy. No additional orders obtained at this time. See flowsheet for additional assessment information.[KK1.1]     Revision History        User Key Date/Time User Provider Type Action    > KK1.1 2/4/2018 12:02 PM Skylar Block RN Registered Nurse Sign            Progress Notes by West Chang MD at 2/4/2018  9:39 AM     Author:  West Chang MD Service:  ICU Author Type:  Physician    Filed:  2/4/2018 10:11 AM Date of Service:  2/4/2018  9:39 AM Creation Time:  2/4/2018  9:39 AM    Status:  Addendum :  West Chang MD (Physician)         ICU Attending Note    I have seen and examined Mariano Clemente, reviewed the patient's history, pertinent labs, vital signs, medications, physical exam, and radiographs.  The patient is critically ill by my examination and requires continued ICU monitoring and cares    Mariano Clemente is admitted to ICU with fever, hypoxia and respiratory distress after presenting by EMS from nursing home.  He was hospitalized last month with similar symptoms and diagnosed with influenza A.  He received BIPAP, which stabilized his situation.    His past history is significant for myasthenia gravis, diabetes, history of stroke, and hypertension..    Recent Events:  SpO2 are good on BIPAP.  Interaction is minimal, which by previous notes is consistent.   Lactate elevated, responded to fluid.  Glucose high.  Yesterday's blood cultures reported positive for GNR today.    Exam:  Temp:  [99.1  F (37.3  C)-102.4  F (39.1  C)] 101.1  F (38.4  C)  Pulse:  [124-155] 124  Heart Rate:  [] 100  Resp:  [17-48] 25  BP: ()/() 84/49  FiO2 (%):  [40 %-70 %] 40 %  SpO2:  [86 %-100 %] 97 %    Intake/Output Summary (Last 24 hours) at 02/04/18 0939  Last data filed at 02/04/18 0900   Gross per 24 hour   Intake          6034.51 ml   Output               95 ml   Net          5939.51 ml     FiO2 (%): 40 % (weaned to 35%)  Resp: 25    Lungs clear  Heart reg  abd soft, nontender, nondistended  Ext warm, no edema    cxr yesterday times 2.  Subtle retrocardiac opacity.  Rest is clear.    Results:  ABG No lab results found in last 7 days.  CBC  Recent Labs  Lab 02/04/18  0500 02/03/18  1812 02/03/18  1810   WBC 35.7*  --  8.5   HGB 10.1* 14.3 13.5   HCT 31.9*  --  43.6     --  322     BMP  Recent Labs  Lab 02/04/18  0500 02/04/18  0200 02/03/18  1812 02/03/18  1810    141 140 139   POTASSIUM 3.8 3.9 4.3 4.3   CHLORIDE 110* 110* 110* 108   CO2 22 20  --  15*   BUN 48* 47* 41* 48*   CR 2.03* 1.97*  --  1.70*   * 253* 253* 254*     LFT  Recent Labs  Lab 02/03/18  1810   AST 10   ALT 11   ALKPHOS 340*   BILITOTAL 0.8   ALBUMIN 2.5*   INR 1.19*     PancreasNo lab results found in last 7 days.  INR  Lab Results   Component Value Date    INR 1.19 02/03/2018    INR 1.06 12/29/2017       Current Issues:  1.  Severe sepsis from bacteremia: source is unclear.  CXRs are not impressive.  Urine is nitrite negative, LE positive with many bacteria.  Could be urine.  On meropenem.    2.  Hypoxic respiratory failure: responding to bipap, continue for now.  Needs goals of care clarified. Pt is DNR/DNI, which I think is reasonable considering age and concurrent disease.    3. Protein calorie malnutrition: need to consider feeding if process continues    4.  UTI: as above,  continue antibiotics    5.  Acute renal failure: Received significant IVF yesterday.  Probably had some ATN.  Cr up to 2.03  Follow.[JC1.1]    6.  Myasthenia gravis: needs pyrostigmine and may need feeding tube to deliver    7.  Significant leukocytosis: from GNR bacteremia?  follow[JC1.2]    Pt is DNR/DNI.  See Dr. Ventura's note for discussion with wife.    Evaluation and management time exclusive of procedures was 30 minutes critical care time including:  examination with the ICU team, discussion of the patient's condition with other physicians and members of the care team, reviewing all data related to the patient, and time utilizing the EMR for documentation of this patient's care.    West Chang MD  Acute Care Surgery/Critical Care[JC1.1]       Revision History        User Key Date/Time User Provider Type Action    > JC1.2 2/4/2018 10:11 AM West Chang MD Physician Addend     JC1.1 2/4/2018  9:55 AM West Chang MD Physician Sign            Progress Notes by Celena Pabon MD at 2/3/2018  9:02 PM     Author:  Celena Pabon MD Service:  TELE-ICU Author Type:  Physician    Filed:  2/4/2018  6:39 AM Date of Service:  2/3/2018  9:02 PM Creation Time:  2/3/2018  9:02 PM    Status:  Addendum :  Celena Pabon MD (Physician)         I received signout from the hospitalist.  This patient with myasthenia gravis is admitted after being confused and having a fever at his living facility.  New infiltrate on the chest x-ray, pyuria seen.  He is being treated with meropenem for its relative benefit related to myasthenia exacerbation.  We will monitor NIF 3 times daily.  He has a lactic acid of 9 despite appearing to be hemodynamically stable.  However he does take metformin.  He received 2 L of fluid prior to the recheck and is being given another 2 L of fluid.  If he remains with good urine output and adequate blood pressure, will continue to monitor the lactic acid only.  He  has a polst with DNR/DNI.[AB1.1]    Addendum: poor urine output with improving lactic acid, rising creatinine.  Initiate norepinephrine as needed to keep MAP > 65. Probable septic shock given procalcitonin, UTI, white blood cell count.[AB1.2]      Revision History        User Key Date/Time User Provider Type Action    > AB1.2 2/4/2018  6:39 AM Celena Pabon MD Physician Addend     AB1.1 2/3/2018  9:04 PM Celena Pabon MD Physician Sign            Progress Notes by Luzma Hein RT at 2/4/2018  5:17 AM     Author:  Luzma Hein RT Service:  (none) Author Type:  Respiratory Therapist    Filed:  2/4/2018  5:18 AM Date of Service:  2/4/2018  5:17 AM Creation Time:  2/4/2018  5:17 AM    Status:  Signed :  Luzma Hein RT (Respiratory Therapist)         RT Note:    Patient remained on BIPAP 12/6 45% throughout night. BS diminished, SPO2 %. RT will continue to follow.    RT Casie 2/4/2018, 5:18 AM[EL1.1]         Revision History        User Key Date/Time User Provider Type Action    > EL1.1 2/4/2018  5:18 AM Luzma Hein RT Respiratory Therapist Sign            ED Provider Notes by Rufina Holland MD at 2/3/2018  6:07 PM     Author:  Rufina Holland MD Service:  Emergency Medicine Author Type:  Physician    Filed:  2/4/2018 12:49 AM Date of Service:  2/3/2018  6:07 PM Creation Time:  2/3/2018  6:07 PM    Status:  Signed :  Rufina Holland MD (Physician)           History     Chief Complaint:  Altered Mental Status[PS1.1] & Hypoxia[PS1.2]    The history is provided by[PS1.1] the EMS personnel[PS1.2]. The history is limited by[PS1.1] the condition of the patient[PS1.2].      Mariano Clemente is an anticoagulated 86 year old male, DNI status, on Plavix who presents via EMS with[PS1.1] altered mental status and hypoxia[PS1.2].[PS1.1] Staff at his care facility contacted EMS due to patient having altered mental status and profound weakness.[PS1.3] On EMS[PS1.1] arrival[PS1.4]  "patient had fever at 104, 77% oxygen saturation on room air, and heart rate 160.[PS1.1] Lungs sounded like \"crud\" per EMS.[PS1.3] Blood pressures[PS1.1] remained around 150/80 en route to the hospital. 18 gauge IV established en route. Blood sugar 289. Per staff patient is normally quiet but alert.[PS1.3]     Hospital Course 12/29/17-1/3/18:  HOSPITAL COURSE:     1.  Acute hypoxic respiratory failure secondary to influenza A.  The patient was admitted to the Intensive Care Unit with BiPAP.  He was treated with Tamiflu and gradually improved.  He did not require intubation.     2.  The patient did not appear to have a myasthenia gravis exacerbation this admission.  Neurology saw the patient in regard to his myasthenia history, and they recommended prednisone 10 mg daily while in hospital.  The patient will transition back to his usual dose of 5 mg daily on discharge from the hospital.       Allergies:[PS1.1]  Asa [Aspirin]  Cephalosporins  Diphenhydramine  Metformin  Penicillins  Strawberry  Pravastatin[PS1.5]     Medications:    Metformin  Albuterol neb/inhaler  Plavix  Mestinon  Gabapentin  Prednisone    Past Medical History:    Diabetes  Myasthenia gravis  CVA  DNI    Past Surgical History:    History reviewed. No pertinent surgical history.     Family History:    History reviewed. No pertinent family history.      Social History:  Presents[PS1.1] via EMS[PS1.2]    Tobacco use: Former smoker  Alcohol use: Negative   PCP:[PS1.1] [facility][PS1.5]    Marital Status:      Review of Systems[PS1.1]   Unable to perform ROS[PS1.2]:[PS1.1] Patient nonverbal[PS1.2]     Physical Exam[PS1.1]     Patient Vitals for the past 24 hrs:   BP Temp Temp src Pulse Heart Rate Resp SpO2 Height Weight   02/03/18 2315 96/64 100.6  F (38.1  C) Bladder - 103 - 98 % - -   02/03/18 2310 - - - - - - 98 % - -   02/03/18 2245 103/58 100.8  F (38.2  C) - - 114 26 99 % - -   02/03/18 2230 93/51 100.8  F (38.2  C) - - 110 22 99 " % - -   02/03/18 2215 107/53 - - - 110 26 99 % - -   02/03/18 2200 103/60 100.6  F (38.1  C) - - 105 21 100 % - -   02/03/18 2145 110/53 100.8  F (38.2  C) - - 109 22 100 % - -   02/03/18 2130 105/64 101.3  F (38.5  C) - - 109 19 100 % - -   02/03/18 2115 99/59 101.5  F (38.6  C) - - 114 21 99 % - -   02/03/18 2100 96/61 101.3  F (38.5  C) - - 117 26 98 % - -   02/03/18 2056 - 101.3  F (38.5  C) - - 115 24 99 % - -   02/03/18 2045 103/60 - - - 112 24 99 % - -   02/03/18 2000 102/61 - - - 122 17 99 % - -   02/03/18 1945 - 102.2  F (39  C) - - 121 26 100 % - -   02/03/18 1930 (!) 138/111 102.4  F (39.1  C) - - 128 26 99 % - -   02/03/18 1915 122/78 - - - - - 99 % 1.829 m (6') 81.1 kg (178 lb 12.7 oz)   02/03/18 1900 106/67 - - - 130 (!) 32 99 % - -   02/03/18 1845 - - - - - 30 100 % - -   02/03/18 1830 130/70 - - - 131 (!) 34 99 % - -   02/03/18 1815 - - - - - - 91 % - -   02/03/18 1814 - - - 124 - (!) 36 96 % - -   02/03/18 1809 (!) 163/93 101.8  F (38.8  C) Oral 155 - (!) 48 (!) 86 % - -[PS1.5]      Physical Exam[PS1.1]  Physical Exam   General: Resting on the bed.[PS1.6]   Appears in acute distress[JB1.1]  Ears, Nose, Throat:  External ears normal.  Nose normal.[PS1.6] Dry MMM[JB1.1]  Eyes:  Conjunctivae clear.  Pupils are equal, round, and reactive.   Neck: Normal range of motion.  Neck supple.   CV: Regular rate and rhythm.  No murmurs.      Respiratory:[PS1.6]tachypnea with coarse diffuse breath sounds[JB1.1]  Gastrointestinal: Soft.  No distension. There is no tenderness.   Neuro: Alert. Moving all extremities appropriately.[PS1.6]  Short yes/no answers[JB1.1]  Skin: Skin is warm and dry.  No rash noted.[PS1.6]     Emergency Department Course[PS1.1]   ECG (18:10:32):  Rate 150 bpm. ME interval *. QRS duration 80. QT/QTc 298/470. P-R-T axes * -51 80. Sinus tachycardia. Le[PS1.7]f[JB1.1]t axis deviation. Low voltage QRS. ST depression, consider subendocardial injury. Changes noted above. Interpreted at 1849 by  Rufina Holland MD.[PS1.7]     Imaging:[PS1.1]  Radiographic findings were communicated with the patient who voiced understanding of the findings.    XR Chest, portable, 1 view:[PS1.8]  IMPRESSION: New patchy opacities medial left lung base that could be atelectasis or airspace disease. Right lung is clear. Normal cardiac silhouette.[PS1.9]     Imaging independently reviewed and agree with radiologist interpretation.[PS1.8]       Laboratory:[PS1.1]  CBC: WNL (WBC[PS1.8] 8.5[PS1.10], HGB[PS1.8] 13.5[PS1.10], PLT[PS1.8] 322[PS1.10])[PS1.8]    1812: ISTAT Basic Met Ica HCT POCT: Chloride 110 (H), CO2 16 (L), Glucose 253 (H), BUN 41 (H), Creatinine 2.0 (H), GFR 32 (L), Calcium ionized 4.1 (L) ow WNL[PS1.10]    BMP:[PS1.8] CO2 15 (L), AG 16 (H), Glucose 254 (H), BUN 48 (H), Creatinine 1.70 (H), GFR 38 (L) ow[PS1.11] WNL[PS1.8]      Recent Labs  Lab 02/03/18  1814 02/03/18  1812 02/03/18  1810   GLC  --  253* 254*   *  --   --[PS1.5]       Hepatic panel:[PS1.8] Bilirubin direct 0.3 (H), Albumin 2.5 (L), Alkphos 340 (H) ow AWNL[PS1.12]  INR:[PS1.8] 1.19 (H)[PS1.12]  PTT:[PS1.8] 34[PS1.12]  Procalcitonin:[PS1.9] 26.65 (HH)[PS1.13]  Resulted at[PS1.14] 1823: ISTAT gases lactate hector POCT: PCO2 23 (L), Bicarbonate 15 (L), Lactic acid 8.1 (HH) ow WNL[PS1.10]   Lactic Acid (resulted at 1949): 9.7 (HH)[PS1.14]   Lactic Acid (resulted 2051): 8.2 (HH)[PS1.15]    Blood culture x2: pending[PS1.8]    UA:[PS1.14] Blood large, Albumin 100, Leuk esterase large, WBC>182, RBC>182, WBC clumps present, Bacteria many, Mucous present, Sperm present[PS1.16], o/w Negative   Urine culture:[PS1.14] pending[PS1.13]     Clostridium difficile toxin B PCR:[PS1.17] Negative[PS1.18]     Interventions:[PS1.3]  1815[PS1.4]:[PS1.17] NS 1L IV Bolus[PS1.11]   1900[PS1.4]: Lactated ringers[PS1.17] 2000[PS1.4] mL IV Bolus[PS1.17]    1910[PS1.4]: Meropenem 1 g IV[PS1.19]  2101[PS1.13]: Tylenol suppository 650 mg PO[PS1.4]    2101: Lactated ringers  IV Infusion  2118: Lactated ringers 1000 mL IV Bolus[PS1.13]      Emergency Department Course:[PS1.1]  1804:[PS1.3] The patient arrived in the emergency department via EMS.[PS1.1]  1804[PS1.3]: I performed an exam of the patient and obtained history, as documented above.[PS1.1]  The patient was placed on oxygen via nasal cannula and continuous cardiac monitoring and pulse oximetry.[PS1.20]   1805:[PS1.3] Past medical records, nursing notes, and vitals reviewed.[PS1.1]  1806: Called for portable XR. Oxygen saturation on oxygen via nasal cannula 84%, ordered BiPAP.[PS1.8]   1807: Discussed antibiotic choice with pharmacy due to patient allergies.[PS1.2]   1810: EKG obtained.[PS1.8]   1812: BiPAP initiated.   1814: Lactic returned at 8.[PS1.3]  1818: Portable CXR while in the emergency department, findings above.[PS1.8]   1846[PS1.11]: Discussed patient with [PS1.9] Randy[PS1.11] of neurology.[PS1.9]   Above interventions provided.[PS1.1]   1927: Discussed the patient with Dr. Ventura[PS1.21] of hospitalist servic[PS1.17]e[PS1.22], who will admit the patient to a[PS1.21]n ICU[PS1.22] bed for further monitoring, evaluation, and treatment.[PS1.21]     1949: Second lactic resulted at 9.7.   1950:[PS1.17] Discussed the[PS1.21] increased lactic[PS1.22] with Dr. Ventura[PS1.21] of hospitalist service[PS1.17] who recommends PICC placement and additional fluid resuscitation[PS1.22].   1952: Paged PICC line team.[PS1.17]   Second IV established.[PS1.20]   Additional interventions provided.[PS1.22]     Impression & Plan      CMS Diagnoses:[PS1.1]   The patient has signs of Septic Shock as evidenced by:    1. Presence of Sepsis, AND  2. Lactic Acid level >4    Time sepsis diagnosis confirmed = 1823 as this was the time whenLactate was resulted and the level was >4      3 Hour Septic Shock Bundle Completion:  1. Initial Lactic Acid Result:[PS1.22]   Recent Labs   Lab Test  02/03/18   2041  02/03/18   1929  02/03/18   1813    LACT  8.2*  9.7*  8.1*[PS1.23]     2. Blood Cultures before Antibiotics: Yes  3. Broad Spectrum Antibiotics Administered: Yes     Anti-infectives[PS1.22]      Meropenem 1 g IV[PS1.19]        4.[PS1.22] 4000[PS1.4] ml of IV fluids.[PS1.22]      6 Hour Severe Sepsis Bundle Completion:  1. Repeat Lactic Acid Level: 9.7, 8.2  2. MAP>65 after initial IVF bolus, will continue to monitor fluid status and vital signs  I attest to having performed a repeat sepsis exam and assessment of perfusion at 1929 and the results demonstrate improved perfusion.[JB1.1]              Medical Decision Making:[PS1.1]  Mariano Clemente is a rather complication 86 year old male with history of myasthenia gravis who presents with hypoxia, fever, and cough. Vital signs show tachycardia to 150's initially with normal blood pressures and a temperature of 102.2, tachypnea also was noted initially into the 30's-40's which improved to 17's-20's. He was desaturating to 86% on oxygen mask. Broad different pursued, but not limited to, pneumonia, influenza, dehydration, electrolyte or metabolic abnormality, sepsis, septic shock, PE, etc. Overall patient appears ill with significantly increased work of breathing. His mentation is not great on initial presentation. Broad differential was initially pursued. Patient was quickly placed on BiPAP with oxygen saturations improving from mid 80's to mid 90's. His breathing also improved with this. He was rather rhonchorous and coarse on initial assessment concerning for pneumonia. Chest x-ray did confirm left lower lobe infiltrate which in the setting of a lactic of 8.1 is concerning for severe sepsis. Additionally, patient has acute kidney injury with elevated creatinine and BUN which I suspect is most likely prerenal in etiology. He also had a gap initially which is improved with fluid hydration secondary to his lactic acidosis. His urinalysis is also dirty but has signs of infection. Blood cultures obtained and  pending. CBC shows no leukocytosis or anemia. INR mildly elevated in addition to mildly elevated alkphos, suspect this is secondary to severe sepsis at this time. Procal added on to investigate likely pneumonia[PS1.4] and is significantly elevated[JB1.1]. Clinically this appears most consistent with pneumonia given hypoxia, tachypnea, and coarse rhonchorous breath sounds. Meropenem was administered. He was maintained on BiPAP. Repeat lactic after 2 liters did not improve and actually worsened to 9.7. An additional 2 liter bolus given and repeat lactate shows[PS1.4] 8.2[JB1.1]. Discussed with neurology, Dr. Padilla, given myasthenia[PS1.4] gravis[JB1.1]. They recommend 10 mg prednisone daily, up from 5 mg baseline. They did advise that antibiotics may worsen his myasthenia but given he appears to be in severe sepsis this is important. At this time he has had no abnormal blood pressures and is maintaining his blood pressures appropriately. His heart rate and other vital signs seem to be improving with supportive cares. Neurology recommended aggressive supportive cares at this time. Patient was discussed with the ICU attending who felt that a PICC line would be warranted given the lactate had worsened. Plan to repeat lactate after 2 more liters as noted above. Plan for PICC line placement. Patient has not required pressors but he will be monitored closely. Patient admitted to ICU in critical but stable condition.[PS1.4]     Critical Care time:[PS1.1]  was[PS1.12] 35[JB1.1] minutes for this patient excluding procedures.[PS1.12]    Diagnosis:[PS1.1]    ICD-10-CM    1. Acute respiratory failure with hypoxia and hypercarbia (H) J96.01     J96.02    2. Severe sepsis (H) A41.9     R65.20    3. Pneumonia of left lower lobe due to infectious organism (H) J18.1    4. Hypoxia R09.02    5. Tachycardia R00.0    6. Acidosis E87.2    7. Acute cystitis without hematuria N30.00    8. Acute renal failure, unspecified acute renal  failure type (H) N17.9[JB1.2]        Disposition:[PS1.1]  Admitted to hospitalist service.[PS1.12]       I, Dipak Andrade, am serving as a scribe at 6:07 PM on 2/3/2018 to document services personally performed by[PS1.1] Rufina Holland MD[PS1.12] based on my observations and the provider's statements to me.    2/3/2018   [facility] EMERGENCY DEPARTMENT[PS1.1]       Rufina Holland MD  02/04/18 0049  [JB1.3]     Revision History        User Key Date/Time User Provider Type Action    > JB1.3 2/4/2018 12:49 AM Rufina Holland MD Physician Sign     JB1.2 2/4/2018 12:47 AM Rufina Holland MD Physician      JB1.1 2/4/2018 12:41 AM Rufina Holland MD Physician      PS1.5 2/4/2018 12:05 AM Andrade, Dipak Scribe Share     PS1.18 2/3/2018 10:51 PM Andrade, Dipak Scribe Share     PS1.13 2/3/2018 10:13 PM Andrade, Dipak Scribe Share     [N/A] 2/3/2018  8:59 PM Andrade, Dipak Scribe Share     PS1.15 2/3/2018  8:58 PM Andrade, Dipak Scribe Share     PS1.23 2/3/2018  8:57 PM Andrade, Dipak Scribe      PS1.4 2/3/2018  8:47 PM Andrade, Dipak Scribe      PS1.22 2/3/2018  8:01 PM Andrade, Dipak Scribe Share     [N/A] 2/3/2018  7:57 PM Andrade, Dipak Scribe Share     PS1.16 2/3/2018  7:56 PM Andrade, Dipak Scribe Share     PS1.20 2/3/2018  7:55 PM Andrade, Dipak Scribe      PS1.17 2/3/2018  7:54 PM Andrade, Dipak Scribe Share     PS1.14 2/3/2018  7:50 PM Andrade, Dipak Scribe Share     PS1.21 2/3/2018  7:28 PM Andrade, Dipak Scribe Share     PS1.6 2/3/2018  6:57 PM Andrade, Dipak Scribe Share     PS1.12 2/3/2018  6:55 PM Andrade, Dipak Scribe Share     PS1.19 2/3/2018  6:53 PM Andrade, Dipak Scribe Share     [N/A] 2/3/2018  6:52 PM Andrade, Dipak Scribe Share     PS1.7 2/3/2018  6:51 PM Andrade, Dipak Scribe Share     PS1.11 2/3/2018  6:49 PM Andrade, Dipak Scribe      PS1.9 2/3/2018  6:38 PM Andrade, Dipak Scribe Share     PS1.10 2/3/2018  6:28 PM Andrade, Dipak Scribe  Share     PS1.2 2/3/2018  6:25 PM Dipak Andrade Share     PS1.8 2/3/2018  6:18 PM Dipak Andrade Share     [N/A] 2/3/2018  6:15 PM Dipak Andrade Share     PS1.3 2/3/2018  6:12 PM Dipak Andrade Share     PS1.1 2/3/2018  6:07 PM Dipak Andrade             Progress Notes by Jeaneth Mccollum RN at 2/4/2018 12:36 AM     Author:  Chun, Jeaneth, RN Service:  Vascular Access Team Author Type:  Registered Nurse    Filed:  2/4/2018 12:37 AM Date of Service:  2/4/2018 12:36 AM Creation Time:  2/4/2018 12:36 AM    Status:  Signed :  Jeaneth Mccollum RN (Registered Nurse)         Per Dr. Pabon, picc line is in good position.  OK to use for medications, labs and power injection.[HL1.1]     Revision History        User Key Date/Time User Provider Type Action    > HL1.1 2/4/2018 12:37 AM Jeaneth Mccollum RN Registered Nurse Sign            Progress Notes by Jeaneth Mccollum RN at 2/4/2018 12:12 AM     Author:  Chun, Jeaneth, RN Service:  Vascular Access Team Author Type:  Registered Nurse    Filed:  2/4/2018 12:19 AM Date of Service:  2/4/2018 12:12 AM Creation Time:  2/4/2018 12:12 AM    Status:  Signed :  Jeaneth Mccollum RN (Registered Nurse)         [facility] PICC  Procedure Note           Peripherally Inserted Central Line Catheter (PICC):       Mariano Clemente  [id number]   February 4, 2018, 12:12 AM Indication: Hypotension  Laboratory sampling  Medication administration           Pause for the cause: Consent for catheter placement procedure signed  Time out completed  Patient ID's verified using two distinct indicators  All necessary equipment is present   Type of line to be used: PICC   Full barrier precautions used: Yes   Skin preparation: Chlorehexidine Gluconate 25% with isopropyl alcohol 70%   Date of insertion:[HL1.1] February[HL1.2] 3[HL1.1], 2018[HL1.2],[HL1.1] 1[HL1.2]1[HL1.1]:[HL1.2]45 P[HL1.1]M[HL1.2]   Device type: Double lumen, valved, 5.0   Catheter brand: Bard Solo Power   Lot  number: [id number]   Insertion location: Right basilic vein   Method of placement: Venipuncture  MST  Ultrasound   Number of attempts: With ultrasound: 1   Without ultrasound: 0   Difficulty threading: No   Midline IV device: Na   Arm circumference: 29 cm measured 10 cm above Right AC   Midline extremity circumference: na cm   Vein diameter:  0.4 cm    Internal length: 47 cm   Midline visible catheter length: 0 cm (1 cm from zero to hub)   Total catheter length: 47 cm   Tip termination: SVC/RA   Method of verification: Chest x-ray   Midline patency post placement: Positive blood return  Flushes without difficulty  Saline locked   Line flush: Line flush documented on the eMAR yes   Placement verified by: Radiologist   Catheter placed by:[HL1.1] Jeaneth Mccollum[HL1.3]RN   Discontinuation form initiated: No   Patient tolerance: Tolerated well   PICC Educational information to patient (Information from PICC package):  No   VAD flushing orders entered:  Yes     Summary:  This procedure was performed without difficulty. There were no immediate complications.       Recorded by[HL1.1] Jeaneth Mccollum[HL1.3] RN    Attestation:  Amount of time performed on this procedure: 15 minutes.[HL1.1]          Revision History        User Key Date/Time User Provider Type Action    > HL1.3 2/4/2018 12:19 AM Jeaneth Mccollum RN Registered Nurse Sign     HL1.2 2/4/2018 12:13 AM Jeaneth Mccollum RN Registered Nurse      HL1.1 2/4/2018 12:12 AM Jeaneth Mccollum RN Registered Nurse             ED Notes by Kalyn Otto RN at 2/3/2018  6:10 PM     Author:  Kalyn Otto RN Service:  (none) Author Type:  Registered Nurse    Filed:  2/3/2018  9:23 PM Date of Service:  2/3/2018  6:10 PM Creation Time:  2/3/2018  6:27 PM    Status:  Addendum :  Kalyn Otto RN (Registered Nurse)         Pt presents via EMS for evaluation of increased weakness, hypoxia, AMS, fevers and tachypnea. Pt had a temp of 100.2 at NH facility earlier today, was monitored and then  found to have a temp of 104 per EMS. Pt was given tylenol PTA. Pt 82% on RA, placed on 15 LPM @ 1806, switched to BiPAP @ 1815 with 70% FiO2. Blood cultures x 2 with basic labs. Portable chest Xray completed in room along with EKG.[EO1.1]      Revision History        User Key Date/Time User Provider Type Action    > [N/A] 2/3/2018  9:23 PM Kalyn Otto RN Registered Nurse Addend     EO1.1 2/3/2018  6:33 PM Kalyn Otto RN Registered Nurse Sign            ED Notes by Kalyn Otto RN at 2/3/2018  9:10 PM     Author:  Kalyn Otto RN Service:  (none) Author Type:  Registered Nurse    Filed:  2/3/2018  9:20 PM Date of Service:  2/3/2018  9:10 PM Creation Time:  2/3/2018  9:20 PM    Status:  Signed :  Kalyn Otto RN (Registered Nurse)         Minimal urine output, (25 mL) since 4 L of fluids and BP's trending down. MD notified. Orders for 1 L bolus LR.[EO1.1]     Revision History        User Key Date/Time User Provider Type Action    > EO1.1 2/3/2018  9:20 PM Kalyn Otto RN Registered Nurse Sign            ED Notes by Kalyn Otto RN at 2/3/2018  6:35 PM     Author:  Kalyn Otto RN Service:  (none) Author Type:  Registered Nurse    Filed:  2/3/2018  6:35 PM Date of Service:  2/3/2018  6:35 PM Creation Time:  2/3/2018  6:35 PM    Status:  Signed :  Kalyn Otto RN (Registered Nurse)         Pt mental status improving after BiPAP. Pt following more commands and answering questions.[EO1.1]     Revision History        User Key Date/Time User Provider Type Action    > EO1.1 2/3/2018  6:35 PM Kalyn Otto RN Registered Nurse Sign            Progress Notes by Nilda Saavedra RT at 2/3/2018  6:29 PM     Author:  Nilda Saavedra RT Service:  (none) Author Type:  Respiratory Therapist    Filed:  2/3/2018  6:34 PM Date of Service:  2/3/2018  6:29 PM Creation Time:  2/3/2018  6:29 PM    Status:  Signed :  Nilda Saavedra RT (Respiratory Therapist)          Placed pt on BIPAP 12/6, RR 10, 70% FiO2 for resp distress, altered mental status and hypoxia. SpO2 improves to mid 90's, BS diminished with scattered coarse. Will continue to monitor pt's respiratory status closely.    RT Zoë  2/3/2018 6:34 PM[RT1.1]           Revision History        User Key Date/Time User Provider Type Action    > RT1.1 2/3/2018  6:34 PM Nilda Saavedra RT Respiratory Therapist Sign            ED Notes by Angeli Iqbal RN at 2/3/2018  6:08 PM     Author:  Angeli Iqbal RN Service:  (none) Author Type:  Registered Nurse    Filed:  2/3/2018  6:08 PM Date of Service:  2/3/2018  6:08 PM Creation Time:  2/3/2018  6:08 PM    Status:  Signed :  Angeli Iqbal RN (Registered Nurse)         Bed: ED33  Expected date: 2/3/18  Expected time:   Means of arrival: Ambulance  Comments:  [phone]     Revision History        User Key Date/Time User Provider Type Action    > KJ1.1 2/3/2018  6:08 PM Angeli Iqbal RN Registered Nurse Sign                  Procedure Notes     No notes of this type exist for this encounter.         Progress Notes - Therapies (Notes from 02/03/18 through 02/06/18)      Progress Notes by Luzma Hein RT at 2/4/2018  5:17 AM     Author:  Luzma Hein RT Service:  (none) Author Type:  Respiratory Therapist    Filed:  2/4/2018  5:18 AM Date of Service:  2/4/2018  5:17 AM Creation Time:  2/4/2018  5:17 AM    Status:  Signed :  Luzma Hein RT (Respiratory Therapist)         RT Note:    Patient remained on BIPAP 12/6 45% throughout night. BS diminished, SPO2 %. RT will continue to follow.    RT Casie 2/4/2018, 5:18 AM[EL1.1]         Revision History        User Key Date/Time User Provider Type Action    > EL1.1 2/4/2018  5:18 AM Luzma Hein RT Respiratory Therapist Sign            Progress Notes by Nilda Saavedra RT at 2/3/2018  6:29 PM     Author:  Nilda Saavedra RT Service:  (none) Author Type:  Respiratory Therapist    Filed:  2/3/2018  6:34 PM Date  of Service:  2/3/2018  6:29 PM Creation Time:  2/3/2018  6:29 PM    Status:  Signed :  Nilda Saavedra RT (Respiratory Therapist)         Placed pt on BIPAP 12/6, RR 10, 70% FiO2 for resp distress, altered mental status and hypoxia. SpO2 improves to mid 90's, BS diminished with scattered coarse. Will continue to monitor pt's respiratory status closely.    RT Zoë  2/3/2018 6:34 PM[RT1.1]           Revision History        User Key Date/Time User Provider Type Action    > RT1.1 2/3/2018  6:34 PM Nilda Saavedra RT Respiratory Therapist Sign

## 2018-02-03 NOTE — IP AVS SNAPSHOT
` `     [place] [address] MEDICAL SURGICAL: [phone]            Medication Administration Report for Mariano Clemente as of 02/06/18 1354   Legend:    Given Hold Not Given Due Canceled Entry Other Actions    Time Time (Time) Time  Time-Action       Inactive    Active    Linked        Medications 01/31/18 02/01/18 02/02/18 02/03/18 02/04/18 02/05/18 02/06/18    0.9% sodium chloride infusion  Rate: 10 mL/hr   Freq: CONTINUOUS Route: IV  Start: 02/04/18 0900   Admin Instructions: tko with insulin infusion         0855 ( )-New Bag        1829 ( )-New Bag            acetaminophen (TYLENOL) Suppository 650 mg  Dose: 650 mg  Freq: EVERY 6 HOURS Route: RE  Start: 02/05/18 1300   Admin Instructions: Maximum acetaminophen dose from all sources = 75 mg/kg/day not to exceed 4 grams/day.          1305 (650 mg)-Given       1829 (650 mg)-Given        0114 (650 mg)-Given       0814 (650 mg)-Given       1329 (650 mg)-Given       [ ] 2000           atropine 1 % ophthalmic solution 1-2 drop  Dose: 1-2 drop  Freq: EVERY 1 HOUR PRN Route: SL  PRN Reason: other  PRN Comment: secretions  Start: 02/05/18 1438          0831 (2 drop)-Given           glycopyrrolate (ROBINUL) injection 0.2-0.4 mg  Dose: 0.2-0.4 mg  Freq: EVERY 4 HOURS Route: IV  Start: 02/05/18 1215   Admin Instructions: For ordered doses up to 0.2 mg, give IV Push undiluted over 1-2 minutes.          1258 (0.4 mg)-Given       1612 (0.4 mg)-Given       2026 (0.4 mg)-Given        0114 (0.2 mg)-Given       0524 (0.2 mg)-Given       0912 (0.2 mg)-Given       1224 (0.4 mg)-Given       [ ] 1700       [ ] 2100           heparin lock flush 10 UNIT/ML injection 2-5 mL  Dose: 2-5 mL  Freq: ONCE PRN Route: IK  PRN Reason: line flush  PRN Comment: for locking each dormant lumen with line placement  Start: 02/03/18 2230   Admin Instructions: May repeat x 1               HYDROmorphone (DILAUDID) (HIGH CONC) oral solution 2 mg  Dose: 2 mg  Freq: EVERY 4 HOURS Route:   Start: 02/06/18 1015    Admin Instructions: Injectable use ORALLY. CAUTION: Special high concentration formulation. Verify dose and volume before administration           1027 (2 mg)-Given       1338 (2 mg)-Given       [ ] 1815       [ ] 2215          And  HYDROmorphone (DILAUDID) (HIGH CONC) oral solution 2 mg  Dose: 2 mg  Freq: EVERY 2 HOURS PRN Route: SL  PRN Reasons: moderate to severe pain,other  PRN Comment: dyspnea or respirations greater than 20  Start: 02/06/18 0958   Admin Instructions: Injectable use ORALLY. CAUTION: Special high concentration formulation. Verify dose and volume before administration               hypromellose-dextran (ARTIFICAL TEARS) ophthalmic solution 1-2 drop  Dose: 1-2 drop  Freq: EVERY 8 HOURS PRN Route: Both Eyes  PRN Reason: dry eyes  PRN Comment: following extubation  Start: 02/04/18 1046              LORazepam (ATIVAN) 1 mg/0.5 mL (HIGH CONC) solution 0.5-1 mg  Dose: 0.5-1 mg  Freq: EVERY 4 HOURS Route: PO  Start: 02/06/18 1015          1211 (1 mg)-Given       [ ] 1545       [ ] 1945       [ ] 2345           naloxone (NARCAN) injection 0.1-0.4 mg  Dose: 0.1-0.4 mg  Freq: EVERY 2 MIN PRN Route: IV  PRN Reason: opioid reversal  Start: 02/05/18 1211   Admin Instructions: For respiratory rate LESS than or EQUAL to 8.  Partial reversal dose:  0.1 mg titrated q 2 minutes for Analgesia Side Effects Monitoring Sedation Level of 3 (frequently drowsy, arousable, drifts to sleep during conversation).Full reversal dose:  0.4 mg bolus for Analgesia Side Effects Monitoring Sedation Level of 4 (somnolent, minimal or no response to stimulation).  For ordered doses up to 2mg give IVP. Give each 0.4mg over 15 seconds in emergency situations. For non-emergent situations further dilute in 9mL of NS to facilitate titration of response.               ondansetron (ZOFRAN-ODT) ODT tab 4 mg  Dose: 4 mg  Freq: EVERY 6 HOURS PRN Route: PO  PRN Reasons: nausea,vomiting  Start: 02/04/18 1046   Admin Instructions: This is Step 1  of nausea and vomiting management.  If nausea not resolved in 15 minutes, go to Step 2 prochlorperazine (COMPAZINE). Do not push through foil backing. Peel back foil and gently remove. Place on tongue immediately. Administration with liquid unnecessary  With dry hands, peel back foil backing and gently remove tablet; do not push oral disintegrating tablet through foil backing; administer immediately on tongue and oral disintegrating tablet dissolves in seconds; then swallow with saliva; liquid not required.              Or  ondansetron (ZOFRAN) injection 4 mg  Dose: 4 mg  Freq: EVERY 6 HOURS PRN Route: IV  PRN Reasons: nausea,vomiting  Start: 02/04/18 1046   Admin Instructions: This is Step 1 of nausea and vomiting management.  If nausea not resolved in 15 minutes, go to Step 2 prochlorperazine (COMPAZINE).  Irritant. For ordered doses up to 4 mg, give IV Push undiluted over 2-5 minutes.               prochlorperazine (COMPAZINE) injection 5 mg  Dose: 5 mg  Freq: EVERY 6 HOURS PRN Route: IV  PRN Reasons: nausea,vomiting  Start: 02/03/18 2326   Admin Instructions: This is Step 2 of nausea and vomiting management. Give if nausea not resolved 15 minutes after giving ondansetron (ZOFRAN). If nausea not resolved in 15 minutes, go to Step 3 metoclopramide (REGLAN), if ordered.  For ordered doses up to 10 mg, give IV Push undiluted. Each 5mg over 1 minute.              Or  prochlorperazine (COMPAZINE) tablet 5 mg  Dose: 5 mg  Freq: EVERY 6 HOURS PRN Route: PO  PRN Reason: vomiting  Start: 02/03/18 2326   Admin Instructions: This is Step 2 of nausea and vomiting management. Give if nausea not resolved 15 minutes after giving ondansetron (ZOFRAN). If nausea not resolved in 15 minutes, go to Step 3 metoclopramide (REGLAN), if ordered.              Or  prochlorperazine (COMPAZINE) Suppository 12.5 mg  Dose: 12.5 mg  Freq: EVERY 12 HOURS PRN Route: RE  PRN Reasons: nausea,vomiting  Start: 02/03/18 2326   Admin Instructions:  This is Step 2 of nausea and vomiting management. Give if nausea not resolved 15 minutes after giving ondansetron (ZOFRAN). If nausea not resolved in 15 minutes, go to Step 3 metoclopramide (REGLAN), if ordered.               sodium chloride (PF) 0.9% PF flush 10 mL  Dose: 10 mL  Freq: EVERY 7 DAYS Route: IK  Start: 02/04/18 0015   Admin Instructions: And Q1H PRN, to lock each CVC - Valved (Tunneled and Non-Tunneled) dormant lumen.         0029 (10 mL)-Given             sodium chloride (PF) 0.9% PF flush 10-20 mL  Dose: 10-20 mL  Freq: EVERY 1 HOUR PRN Route: IK  PRN Reasons: line flush,post meds or blood draw  Start: 02/04/18 0006   Admin Instructions: to flush CVC - Valved (Tunneled and Non-Tunneled).   10 mL post IV meds; 20 mL post blood draw.              Completed Medications  Medications 01/31/18 02/01/18 02/02/18 02/03/18 02/04/18 02/05/18 02/06/18         Dose: 2,000 mL  Freq: ONCE Route: IV  Last Dose: Stopped (02/03/18 2003)  Start: 02/03/18 1954   End: 02/03/18 2003       1815 (2,000 mL)-New Bag       2003-Stopped                Dose: 1,000 mg  Freq: ONCE Route: IV  Last Dose: 1,000 mg (02/04/18 0834)  Start: 02/04/18 0830   End: 02/04/18 0849   Admin Instructions: Maximum acetaminophen dose from all sources = 75 mg/kg/day not to exceed 4 grams/day.         0834 (1,000 mg)-New Bag               Dose: 650 mg  Freq: ONCE Route: RE  Start: 02/03/18 2055   End: 02/03/18 2101   Admin Instructions: Maximum acetaminophen dose from all sources = 75 mg/kg/day not to exceed 4 grams/day.        2101 (650 mg)-Given                Dose: 0.2 mg  Freq: ONCE Route: IV  Start: 02/04/18 1100   End: 02/04/18 1126   Admin Instructions: Premed-Give 60 minutes prior to removal of endotracheal tube.  For ordered doses up to 0.2 mg, give IV Push undiluted over 1-2 minutes.         1125 (0.2 mg)-Given               Dose: 1,000 mL  Freq: ONCE Route: IV  Last Dose: Stopped (02/03/18 2221)  Start: 02/03/18 2113   End: 02/03/18 2221        2118 (1,000 mL)-New Bag       2221-Stopped                Dose: 2,000 mL  Freq: ONCE Route: IV  Last Dose: Stopped (02/03/18 2038)  Start: 02/03/18 1954   End: 02/03/18 2038       1900 (2,000 mL)-New Bag       2038-Stopped                Start: 02/03/18 1836   End: 02/03/18 1905   Admin Instructions: Abby Feliz : betty cornejoide        1905 ( )-Given                Dose: 0.5-5 mL  Freq: ONCE PRN Route: OTHER  PRN Comment: mild pain For local anesthetic during PICC insertion.  Start: 02/03/18 2230   End: 02/03/18 2335   Admin Instructions: Give Sub-Q/Intradermal.  Give in divided doses as needed.        2335 (1 mL)-Given [C]                Dose: 1 g  Freq: ONCE Route: IV  Start: 02/04/18 0645   End: 02/04/18 0750        0750 (1 g)-New Bag               Dose: 1 g  Freq: ONCE Route: IV  Indications of Use: SEPSIS  Indications Comment: Hospital acquired pneumonia  Start: 02/03/18 1831   End: 02/03/18 1913   Admin Instructions: FIRST DOSE STAT  Nursing to reconstitute vial with 20 mL Sterile Water for Injection and give IVP immediately over 3-5 minutes        1910 (1 g)-Given                Dose: 5-50 mL  Freq: ONCE PRN Route: IK  PRN Reason: line flush  PRN Comment: to flush each lumen with line placement  Start: 02/03/18 2230   End: 02/03/18 2346   Admin Instructions: May repeat x 1        2345 (10 mL)-Given [C]       2346 (10 mL)-Given [C]             Discontinued Medications  Medications 01/31/18 02/01/18 02/02/18 02/03/18 02/04/18 02/05/18 02/06/18         Dose: 650 mg  Freq: EVERY 6 HOURS Route: RE  Start: 02/05/18 1800   End: 02/05/18 1255   Admin Instructions: Maximum acetaminophen dose from all sources = 75 mg/kg/day not to exceed 4 grams/day.          1255-Med Discontinued          Dose: 975 mg  Freq: EVERY 8 HOURS Route: RE  Start: 02/05/18 1200   End: 02/05/18 1248   Admin Instructions: Maximum acetaminophen dose from all sources = 75 mg/kg/day not to exceed 4 grams/day.          1248-Med  Discontinued  (1255)-Not Given [C]              Dose: 650 mg  Freq: EVERY 4 HOURS PRN Route: PO  PRN Reason: mild pain  Start: 02/04/18 0817   End: 02/05/18 1150   Admin Instructions: Maximum acetaminophen dose from all sources = 75 mg/kg/day not to exceed 4 grams/day.          1150-Med Discontinued       Or    Dose: 650 mg  Freq: EVERY 4 HOURS PRN Route: RE  PRN Reason: mild pain  Start: 02/04/18 0817   End: 02/05/18 1150   Admin Instructions: Maximum acetaminophen dose from all sources = 75 mg/kg/day not to exceed 4 grams/day.          1150-Med Discontinued          Dose: 1-2 drop  Freq: EVERY 1 HOUR PRN Route: SL  PRN Reason: other  PRN Comment: secretions  Start: 02/04/18 1048   End: 02/05/18 1438         1438-Med Discontinued          Dose: 75 mg  Freq: DAILY Route: PO  Start: 02/04/18 0900   End: 02/04/18 1050        (0830)-Not Given [C]       1050-Med Discontinued           Freq: CONTINUOUS PRN Route: IV  PRN Comment: Give if on IV Insulin Infusion, and Parenteral or Enteral nutrition held or cycled off.   Start: 02/04/18 0756   End: 02/05/18 1210   Admin Instructions: Infuse IV D10W at TPN/TF rate whenever nutrition is held or cycled off.          1210-Med Discontinued          Dose: 15-30 g  Freq: EVERY 15 MIN PRN Route: PO  PRN Reason: low blood sugar  Start: 02/04/18 0756   End: 02/04/18 1050   Admin Instructions: Give 15 g for BG 51 to 69 mg/dL IF patient is conscious and able to swallow. Give 30 g for BG less than or equal to 50 mg/dL IF patient is conscious and able to swallow. Do NOT give glucose gel via enteral tube.  IF patient has enteral tube: give apple juice 120 mL (4 oz or 15 g of CHO) via enteral tube for BG 51 to 69 mg/dL.  Give apple juice 240 mL (8 oz or 30 g of CHO) via enteral tube for BG less than or equal to 50 mg/dL.    ~Oral gel is preferable for conscious and able to swallow patient.   ~IF gel unavailable or patient refuses may provide apple juice 120 mL (4 oz or 15 g of CHO).  Document juice on I and O flowsheet.         1050-Med Discontinued        Or    Dose: 25-50 mL  Freq: EVERY 15 MIN PRN Route: IV  PRN Reason: low blood sugar  Start: 02/04/18 0756   End: 02/05/18 1210   Admin Instructions: Use if have IV access, BG less than 70 mg/dL and meet dose criteria below:  Dose if conscious and alert (or disorientated) and NPO = 25 mL  Dose if unconscious / not alert = 50 mL  Vesicant. For ordered doses up to 25 mg, give IV Push undiluted. Give each 5g over 1 minute.          1210-Med Discontinued       Or    Dose: 1 mg  Freq: EVERY 15 MIN PRN Route: SC  PRN Reason: low blood sugar  PRN Comment: May repeat x 1 only  Start: 02/04/18 0756   End: 02/05/18 1210   Admin Instructions: May give SQ or IM. ONLY use glucagon IF patient has NO IV access AND is UNABLE to swallow AND blood glucose is LESS than or EQUAL to 50 mg/dL.  Give IV Push over 1 minute. Reconstitute with 1mL sterile water.          1210-Med Discontinued          Dose: 15-30 g  Freq: EVERY 15 MIN PRN Route: PO  PRN Reason: low blood sugar  Start: 02/03/18 2326   End: 02/04/18 1050   Admin Instructions: Give 15 g for BG 51 to 69 mg/dL IF patient is conscious and able to swallow. Give 30 g for BG less than or equal to 50 mg/dL IF patient is conscious and able to swallow. Do NOT give glucose gel via enteral tube.  IF patient has enteral tube: give apple juice 120 mL (4 oz or 15 g of CHO) via enteral tube for BG 51 to 69 mg/dL.  Give apple juice 240 mL (8 oz or 30 g of CHO) via enteral tube for BG less than or equal to 50 mg/dL.    ~Oral gel is preferable for conscious and able to swallow patient.   ~IF gel unavailable or patient refuses may provide apple juice 120 mL (4 oz or 15 g of CHO). Document juice on I and O flowsheet.         1050-Med Discontinued        Or    Dose: 25-50 mL  Freq: EVERY 15 MIN PRN Route: IV  PRN Reason: low blood sugar  Start: 02/03/18 2326   End: 02/04/18 1050   Admin Instructions: Use if have IV access, BG  less than 70 mg/dL and meet dose criteria below:  Dose if conscious and alert (or disorientated) and NPO = 25 mL  Dose if unconscious / not alert = 50 mL  Vesicant. For ordered doses up to 25 mg, give IV Push undiluted. Give each 5g over 1 minute.         1050-Med Discontinued        Or    Dose: 1 mg  Freq: EVERY 15 MIN PRN Route: SC  PRN Reason: low blood sugar  PRN Comment: May repeat x 1 only  Start: 02/03/18 2326   End: 02/04/18 1050   Admin Instructions: May give SQ or IM. ONLY use glucagon IF patient has NO IV access AND is UNABLE to swallow AND blood glucose is LESS than or EQUAL to 50 mg/dL.  Give IV Push over 1 minute. Reconstitute with 1mL sterile water.         1050-Med Discontinued           Dose: 0.2-0.4 mg  Freq: EVERY 4 HOURS PRN Route: IV  PRN Reason: other  PRN Comment: secretions  Start: 02/04/18 1048   End: 02/05/18 1201   Admin Instructions: For ordered doses up to 0.2 mg, give IV Push undiluted over 1-2 minutes.          1201-Med Discontinued          Dose: 5,000 Units  Freq: EVERY 12 HOURS Route: SC  Start: 02/04/18 2100   End: 02/04/18 1050   Admin Instructions: HOLD heparin IF platelet count falls below 50% baseline or less than 100,000/ L and notify provider. Use this product If CrCl less than 30 mL/min.         1050-Med Discontinued           Dose: 1-2 mg  Freq: EVERY 2 HOURS PRN Route: SL  PRN Reasons: moderate to severe pain,other  PRN Comment: dyspnea, respirations greater than 20  Start: 02/05/18 1207   End: 02/06/18 1001   Admin Instructions: Injectable use ORALLY. CAUTION: Special high concentration formulation. Verify dose and volume before administration          1258 (1 mg)-Given       1612 (1 mg)-Given       1829 (2 mg)-Given        0245 (2 mg)-Given       0523 (2 mg)-Given       0814 (2 mg)-Given       1001-Med Discontinued      Or    Dose: 0.3-0.5 mg  Freq: EVERY 2 HOURS PRN Route: IV  PRN Reasons: moderate to severe pain,other  PRN Comment: dyspnea, respirations greater than 20  per minute  Start: 02/05/18 1207   End: 02/06/18 1001   Admin Instructions: For ordered doses up to 4 mg give IV Push undiluted. Administer each 2mg over 2-5 minutes.                                                     1001-Med Discontinued         Dose: 0.3-0.5 mg  Freq: EVERY 10 MIN PRN Route: IV  PRN Reason: other  PRN Comment: For up to 30 minutes after initial dose,  for initial management of distress following extubation.  Start: 02/04/18 1046   End: 02/05/18 1210   Admin Instructions: Symptoms may include:  moderate-severe use of accessory muscles, nasal flaring, gasping or increased respiratory effort, increased agitation, restlessness, grimacing, splinting, diaphoresis, increases in heart rate, respiratory rate, or blood pressure.  For ordered doses up to 4 mg give IV Push undiluted. Administer each 2mg over 2-5 minutes.         1115 (0.5 mg)-Given       1135 (0.5 mg)-Given [C]        1210-Med Discontinued       And    Dose: 0.3-0.5 mg  Freq: EVERY 30 MIN PRN Route: IV  PRN Reason: other  PRN Comment: for ongoing management of distress following extubation.  Start: 02/04/18 1046   End: 02/05/18 1210   Admin Instructions: Symptoms may include:  moderate-severe use of accessory muscles, nasal flaring, gasping or increased respiratory effort, increased agitation, restlessness, grimacing, splinting, diaphoresis, increases in heart rate, respiratory rate, or blood pressure.  For ordered doses up to 4 mg give IV Push undiluted. Administer each 2mg over 2-5 minutes.         1526 (0.5 mg)-Given       2029 (0.5 mg)-Given        1210-Med Discontinued          Rate: 0-24 mL/hr Dose: 0-24 Units/hr  Freq: CONTINUOUS Route: IV  Last Dose: Stopped (02/04/18 1110)  Start: 02/04/18 0800   End: 02/04/18 1050   Admin Instructions: Initiate drip with Algorithm #1 (see hyperlink to protocol). Start protocol only if glucose greater than 150 mg/dL. Maintain glucose level between 100-150 mg/dL.  Discontinue when glycemic control  achieved and transitioning to SQ insulin, or Insulin therapy no longer required. When blood glucose has stabilized and patient is tolerating PO intake, call provider for transition to SQ insulin.  For Infusion Instructions, Click on ADULT DRIP PROTOCOL hyperlink below.         0853 (1.5 Units/hr)-New Bag       1050-Med Discontinued  1110-Stopped               Dose: 1-12 Units  Freq: EVERY 4 HOURS Route: SC  Start: 02/03/18 2330   End: 02/04/18 0756   Admin Instructions: Correction Scale - HIGH INSULIN RESISTANCE DOSING     Do Not give Correction Insulin if BG less than 140  For  - 164 give 1 unit.  For  - 189 give 2 units.  For  - 214 give 3 units.  For  - 239 give 4 units.  For  - 264 give 5 units.  For  - 289 give 6 units.  For  - 314 give 7 units.  For  - 339 give 8 units  For  - 364 give 9 units  For  - 389 give 10 units  For  - 414 give 11 units  For BG greater than or equal to 415 give 12 units  Check blood glucose Q4H and administer based on blood glucose.  Notify provider if glucose greater than or equal to 350 mg/dL after administration of correction dose.  If given at mealtime, must be administered 5 min before meal or immediately after.         0148 (5 Units)-Given       0445 (4 Units)-Given       0756-Med Discontinued           Rate: 125 mL/hr   Freq: CONTINUOUS Route: IV  Last Dose: Stopped (02/03/18 2109)  Start: 02/03/18 2055   End: 02/03/18 2355       2101 ( )-New Bag       2109-ED Infusing on Admission/transfer       2355-Med Discontinued    1110-Stopped               Rate: 125 mL/hr   Freq: CONTINUOUS Route: IV  Start: 02/03/18 2330   End: 02/04/18 1050        0028 ( )-Rate/Dose Verify       0608 ( )-New Bag       1050-Med Discontinued           Dose: 0.5-1 mg  Freq: EVERY 4 HOURS PRN Route: SL  PRN Reasons: anxiety,agitation  Start: 02/05/18 1328   End: 02/06/18 1001         1422 (1 mg)-Given        0335 (1 mg)-Given       0746 (1  mg)-Given       1001-Med Discontinued      Or    Dose: 0.5 mg  Freq: EVERY 4 HOURS PRN Route: IV  PRN Reasons: anxiety,agitation  Start: 02/05/18 1328   End: 02/06/18 1001   Admin Instructions: For IV PUSH: Dilute with equal volume of NS. For ordered doses up to 4 mg give IV Push. Administer each 2mg over 1-5 minutes.                                1001-Med Discontinued         Dose: 25-50 mg  Freq: EVERY 2 HOURS PRN Route: IV  PRN Reason: rigors  Start: 02/04/18 1611   End: 02/06/18 1001   Admin Instructions: Give IV Push undiluted. 10-40 mg over 2-3 minutes, up to 125 mg over 3-15 minutes           1001-Med Discontinued         Dose: 1 g  Freq: EVERY 12 HOURS Route: IV  Indications of Use: SEPSIS  Indications Comment: Hospital acquired pneumonia  Start: 02/04/18 0800   End: 02/04/18 1050   Admin Instructions: FIRST DOSE STAT  Nursing to reconstitute vial with 20 mL Sterile Water for Injection and give IVP immediately over 3-5 minutes         0825 (1 g)-Given       1050-Med Discontinued           Dose: 10 mg  Freq: EVERY 24 HOURS Route: IV  Start: 02/04/18 0002   End: 02/06/18 1023   Admin Instructions: Give Doses 125mg and less IV Push over 2-3 minutes - reconstitute with 1 mL of bacteriostatic water if no diluent is provided. Doses greater than 125 mg need to be in at least 50 mL IVPB.         0026 (10 mg)-Given        (0017)-Not Given [C]        0113 (10 mg)-Given       1023-Med Discontinued         Dose: 1-4 mg  Freq: EVERY 1 HOUR PRN Route: IV  PRN Reason: anxiety  Start: 02/04/18 1057   End: 02/05/18 1201   Admin Instructions: For ordered doses up to 2.5 mg give IV Push slowly titrated over a minimum of 2 minutes. Dilute each 1mg in 4mL of NS.         1120 (1 mg)-Given       1146 (2 mg)-Given       1526 (2 mg)-Given       1538 (2 mg)-Given       2029 (2 mg)-Given        1201-Med Discontinued          Dose: 0.1-0.4 mg  Freq: EVERY 2 MIN PRN Route: IV  PRN Reason: opioid reversal  Start: 02/03/18 9996   End:  02/05/18 1210   Admin Instructions: For respiratory rate LESS than or EQUAL to 8.  Partial reversal dose:  0.1 mg titrated q 2 minutes for Analgesia Side Effects Monitoring Sedation Level of 3 (frequently drowsy, arousable, drifts to sleep during conversation).Full reversal dose:  0.4 mg bolus for Analgesia Side Effects Monitoring Sedation Level of 4 (somnolent, minimal or no response to stimulation).  For ordered doses up to 2mg give IVP. Give each 0.4mg over 15 seconds in emergency situations. For non-emergent situations further dilute in 9mL of NS to facilitate titration of response.          1210-Med Discontinued          Rate: 2.3-30.3 mL/hr Dose: 0.03-0.4 mcg/kg/min  Weight Dosing Info: 80.7 kg  Freq: CONTINUOUS Route: IV  Last Dose: Stopped (02/04/18 0815)  Start: 02/04/18 0445   End: 02/04/18 1050   Admin Instructions: For range orders: start at lowest dose ordered. Titrate by 0.03 to 0.1 mcg/kg/min every 5 minutes to keep MAP greater than 65 mmHg.  Notify prescriber if higher doses are required to achieve blood pressure goals.  Protect from light. Vesicant.         0446 (0.03 mcg/kg/min)-New Bag       0600 (0.04 mcg/kg/min)-Rate/Dose Change       0615 (0.05 mcg/kg/min)-Rate/Dose Change       0630 (0.06 mcg/kg/min)-Rate/Dose Change       0745 (0.03 mcg/kg/min)-Rate/Dose Change       0815-Stopped       1050-Med Discontinued           Dose: 4 mg  Freq: EVERY 6 HOURS PRN Route: PO  PRN Reasons: nausea,vomiting  Start: 02/03/18 2326   End: 02/04/18 1121   Admin Instructions: This is Step 1 of nausea and vomiting management.  If nausea not resolved in 15 minutes, go to Step 2 prochlorperazine (COMPAZINE). Do not push through foil backing. Peel back foil and gently remove. Place on tongue immediately. Administration with liquid unnecessary  With dry hands, peel back foil backing and gently remove tablet; do not push oral disintegrating tablet through foil backing; administer immediately on tongue and oral  disintegrating tablet dissolves in seconds; then swallow with saliva; liquid not required.         1121-Med Discontinued        Or    Dose: 4 mg  Freq: EVERY 6 HOURS PRN Route: IV  PRN Reasons: nausea,vomiting  Start: 02/03/18 2326   End: 02/04/18 1121   Admin Instructions: This is Step 1 of nausea and vomiting management.  If nausea not resolved in 15 minutes, go to Step 2 prochlorperazine (COMPAZINE).  Irritant. For ordered doses up to 4 mg, give IV Push undiluted over 2-5 minutes.         1121-Med Discontinued           Dose: 180 mg  Freq: 2 TIMES DAILY Route: PO  Start: 02/04/18 0045   End: 02/04/18 1010   Admin Instructions: DO NOT CRUSH.         0133 (180 mg)-Given              1010-Med Discontinued           Dose: 180 mg  Freq: 2 TIMES DAILY Route: PO  Start: 02/04/18 0015   End: 02/04/18 0039   Admin Instructions: DO NOT CRUSH.         (0029)-Not Given       0039-Med Discontinued           Dose: 60 mg  Freq: EVERY 4 HOURS SCHEDULED Route: PER FEEDING   Start: 02/04/18 1200   End: 02/06/18 1023   Admin Instructions: Tablet can be crushed.         (1231)-Not Given [C]       (1822)-Not Given [C]       (2033)-Not Given [C]        (0018)-Not Given [C]       (0340)-Not Given [C]       (1225)-Not Given [C]       (1233)-Not Given       (1600)-Not Given       (1902)-Not Given        (0110)-Not Given [C]                     1023-Med Discontinued    Medications 01/31/18 02/01/18 02/02/18 02/03/18 02/04/18 02/05/18 02/06/18

## 2018-02-03 NOTE — IP AVS SNAPSHOT
MRN:[id number]                      After Visit Summary   2/3/2018    Mariano Clemente    MRN: [id number]           Thank you!     Thank you for choosing [facility] for your care. Our goal is always to provide you with excellent care. Hearing back from our patients is one way we can continue to improve our services. Please take a few minutes to complete the written survey that you may receive in the mail after you visit. If you would like to speak to someone directly about your visit please contact Patient Relations at [phone]. Thank you!          Patient Information     Date Of Birth          10/6/1931        About your hospital stay     You were admitted on:  February 3, 2018 You last received care in the:  [place] [address] [facility]    You were discharged on:  February 6, 2018       Who to Call     For medical emergencies, please call 911.  For non-urgent questions about your medical care, please call your primary care provider or clinic, [phone]          Attending Provider     Provider Specialty    Rufina Holland MD Emergency Medicine    Lorenzo, Adri Moreira MD Internal Medicine       Primary Care Provider Office Phone # Fax #    [facility] [phone]      After Care Instructions     Activity - Up with nursing assistance           Advance Diet as Tolerated       Follow this diet upon discharge: Has been NPO in hospital.            Fall precautions           General info for SNF       Length of Stay Estimate: Short Term Care: Estimated # of Days <30  Condition at Discharge: Terminal  Level of care:board and care  Rehabilitation Potential: Poor  Admission H&P remains valid and up-to-date: Yes  Recent Chemotherapy: N/A  Use Nursing Home Standing Orders: Yes            Mantoux instructions       Give two-step Mantoux (PPD) Per Facility Policy Yes if needed.                  Follow-up Appointments     Follow Up and recommended labs and  "tests       Hospice consult.                  Pending Results     Date and Time Order Name Status Description    2/3/2018 1920 Urine Culture Aerobic Bacterial Preliminary             Statement of Approval     Ordered          18 1120  I have reviewed and agree with all the recommendations and orders detailed in this document.  EFFECTIVE NOW     Approved and electronically signed by:  Sailaja Johnson MD           18 1035  I have reviewed and agree with all the recommendations and orders detailed in this document.  EFFECTIVE NOW     Approved and electronically signed by:  Sailaja Johnson MD             Admission Information     Date & Time Provider Department Dept. Phone    2/3/2018 Adri Ventura MD [place] [address] [facility] [phone]      Your Vitals Were     Blood Pressure Pulse Temperature Respirations Height Weight    97/60 117 102  F (38.9  C) 26 1.829 m (6') 80.7 kg (177 lb 14.6 oz)    Pulse Oximetry BMI (Body Mass Index)                97% 24.13 kg/m2          MyChart Information     [facility] lets you send messages to your doctor, view your test results, renew your prescriptions, schedule appointments and more. To sign up, go to [email or web address] . Click on \"Log in\" on the left side of the screen, which will take you to the Welcome page. Then click on \"Sign up Now\" on the right side of the page.     You will be asked to enter the access code listed below, as well as some personal information. Please follow the directions to create your username and password.     Your access code is: [email or web address]  Expires: 3/29/2018  7:16 AM     Your access code will  in 90 days. If you need help or a new code, please call your [facility] clinic or [phone].        Care EveryWhere ID     This is your Care EveryWhere ID. This could be used by other organizations to access your [facility] medical records  [id number]        Equal Access to Services     CAITLYN BARNARD AH: " Hadii aad ku hadasho Soomaali, waaxda luqadaha, qaybta kaalmada adeolivier, leslie radfordn rigo [place]. So [facility] [phone].    ATENCIÓN: Si habla ruben, tiene a elizondo disposición servicios gratuitos de asistencia lingüística. Llame al [phone].    We comply with applicable federal civil rights laws and [place] laws. We do not discriminate on the basis of race, color, national origin, age, disability, sex, sexual orientation, or gender identity.               Review of your medicines      START taking        Dose / Directions    acetaminophen 650 MG Suppository   Commonly known as:  TYLENOL   Used for:  End of life care   Replaces:  TYLENOL PO        Dose:  650 mg   Place 1 suppository (650 mg) rectally every 6 hours   Quantity:  12 suppository   Refills:  0       atropine 1 % ophthalmic solution   Used for:  End of life care        Dose:  1-2 drop   Place 1-2 drops under the tongue every hour as needed for other (secretions)   Quantity:  1 Bottle   Refills:  0       bisacodyl 10 MG Suppository   Commonly known as:  DULCOLAX   Used for:  End of life care        Unwrap and insert 1 suppository rectally twice daily as needed for constipation.   Quantity:  1 suppository   Refills:  0       haloperidol 2 MG/ML (HIGH CONC) solution   Commonly known as:  HALDOL   Used for:  End of life care        Dose:  0.5-1 mg   Take 0.25-0.5 mLs (0.5-1 mg) by mouth, place under tongue or insert rectally every 6 hours as needed for agitation (nausea)   Quantity:  30 mL   Refills:  0       HYDROmorphone (HIGH CONC) 10 mg/mL Liqd oral   Commonly known as:  DILAUDID   Used for:  End of life care        Dose:  2 mg   Place 0.2 mLs (2 mg) under the tongue every 4 hours May also give an additional 2 mg SL q 2 hours prn pain, or dyspnea with respirations greater than 20 per minute   Quantity:  30 mL   Refills:  0       hypromellose-dextran Soln ophthalmic solution   Used for:  End of life care        Dose:  1-2 drop    Place 1-2 drops into both eyes every 8 hours as needed for dry eyes (following extubation)   Quantity:  1 Bottle   Refills:  0       LORazepam 2 MG/ML (HIGH CONC) solution   Commonly known as:  LORazepam INTENSOL   Used for:  End of life care        Dose:  1 mg   Place 0.5 mLs (1 mg) under the tongue every 4 hours   Quantity:  15 mL   Refills:  0       MEDICATION INSTRUCTION   Used for:  End of life care        If care facility cannot accept or use ranges, facility is instructed to use lower end of dosing range   Refills:  0         CONTINUE these medicines which may have CHANGED, or have new prescriptions. If we are uncertain of the size of tablets/capsules you have at home, strength may be listed as something that might have changed.        Dose / Directions    albuterol (2.5 MG/3ML) 0.083% neb solution   This may have changed:  Another medication with the same name was removed. Continue taking this medication, and follow the directions you see here.        Dose:  2.5 mg   Take 2.5 mg by nebulization every 4 hours as needed for shortness of breath / dyspnea or wheezing   Refills:  0         STOP taking     GABAPENTIN PO           metFORMIN 500 MG tablet   Commonly known as:  GLUCOPHAGE           PLAVIX PO           PREDNISONE PO           pyridostigmine 180 MG CR tablet   Commonly known as:  MESTINON           TYLENOL PO   Replaced by:  acetaminophen 650 MG Suppository                Where to get your medicines      These medications were sent to [facility] [place] [id number]     Phone:  [phone]     acetaminophen 650 MG Suppository    atropine 1 % ophthalmic solution    bisacodyl 10 MG Suppository    haloperidol 2 MG/ML (HIGH CONC) solution    hypromellose-dextran Soln ophthalmic solution         Some of these will need a paper prescription and others can be bought over the counter. Ask your nurse if you have questions.      Bring a paper prescription for each of these medications     HYDROmorphone (HIGH CONC) 10 mg/mL Liqd oral    LORazepam 2 MG/ML (HIGH CONC) solution       You don't need a prescription for these medications     MEDICATION INSTRUCTION                Protect others around you: Learn how to safely use, store and throw away your medicines at www.disposemymeds.org.        Information about OPIOIDS     PRESCRIPTION OPIOIDS: WHAT YOU NEED TO KNOW    Prescription opioids can be used to help relieve moderate to severe pain and are often prescribed following a surgery or injury, or for certain health conditions. These medications can be an important part of treatment but also come with serious risks. It is important to work with your health care provider to make sure you are getting the safest, most effective care.    WHAT ARE THE RISKS AND SIDE EFFECTS OF OPIOID USE?  Prescription opioids carry serious risks of addiction and overdose, especially with prolonged use. An opioid overdose, often marked by slowed breathing can cause sudden death. The use of prescription opioids can have a number of side effects as well, even when taken as directed:      Tolerance - meaning you might need to take more of a medication for the same pain relief    Physical dependence - meaning you have symptoms of withdrawal when a medication is stopped    Increased sensitivity to pain    Constipation    Nausea, vomiting, and dry mouth    Sleepiness and dizziness    Confusion    Depression    Low levels of testosterone that can result in lower sex drive, energy, and strength    Itching and sweating    RISKS ARE GREATER WITH:    History of drug misuse, substance use disorder, or overdose    Mental health conditions (such as depression or anxiety)    Sleep apnea    Older age (65 years or older)    Pregnancy    Avoid alcohol while taking prescription opioids.   Also, unless specifically advised by your health care provider, medications to avoid  include:    Benzodiazepines (such as Xanax or Valium)    Muscle relaxants (such as Soma or Flexeril)    Hypnotics (such as Ambien or Lunesta)    Other prescription opioids    KNOW YOUR OPTIONS:  Talk to your health care provider about ways to manage your pain that do not involve prescription opioids. Some of these options may actually work better and have fewer risks and side effects:    Pain relievers such as acetaminophen, ibuprofen, and naproxen    Some medications that are also used for depression or seizures    Physical therapy and exercise    Cognitive behavioral therapy, a psychological, goal-directed approach, in which patients learn how to modify physical, behavioral, and emotional triggers of pain and stress    IF YOU ARE PRESCRIBED OPIOIDS FOR PAIN:    Never take opioids in greater amounts or more often than prescribed    Follow up with your primary health care provider and work together to create a plan on how to manage your pain.    Talk about ways to help manage your pain that do not involve prescription opioids    Talk about all concerns and side effects    Help prevent misuse and abuse    Never sell or share prescription opioids    Never use another person's prescription opioids    Store prescription opioids in a secure place and out of reach of others (this may include visitors, children, friends, and family)    Visit www.cdc.gov/drugoverdose to learn about risks of opioid abuse and overdose    If you believe you may be struggling with addiction, tell your health care provider and ask for guidance or call [facility]'s National Helpline at 1-109-198-HELP    LEARN MORE / www.cdc.gov/drugoverdose/prescribing/guideline.html    Safely dispose of unused prescription opioids: Find your local drug take-back programs and more information about the importance of safe disposal at www.doseofreality.[place].gov             Medication List: This is a list of all your medications and when to take them. Check marks below  indicate your daily home schedule. Keep this list as a reference.      Medications           Morning Afternoon Evening Bedtime As Needed    acetaminophen 650 MG Suppository   Commonly known as:  TYLENOL   Place 1 suppository (650 mg) rectally every 6 hours   Last time this was given:  650 mg on 2/6/2018  1:29 PM   Next Dose Due:  2/6: 7:30pm                                albuterol (2.5 MG/3ML) 0.083% neb solution   Take 2.5 mg by nebulization every 4 hours as needed for shortness of breath / dyspnea or wheezing   Next Dose Due:  Resume as needed                                   atropine 1 % ophthalmic solution   Place 1-2 drops under the tongue every hour as needed for other (secretions)   Last time this was given:  2 drops on 2/6/2018  8:31 AM   Next Dose Due:  Available when needed                                   bisacodyl 10 MG Suppository   Commonly known as:  DULCOLAX   Unwrap and insert 1 suppository rectally twice daily as needed for constipation.   Next Dose Due:  Available when needed                                   haloperidol 2 MG/ML (HIGH CONC) solution   Commonly known as:  HALDOL   Take 0.25-0.5 mLs (0.5-1 mg) by mouth, place under tongue or insert rectally every 6 hours as needed for agitation (nausea)   Next Dose Due:  Available when needed                                   HYDROmorphone (HIGH CONC) 10 mg/mL Liqd oral   Commonly known as:  DILAUDID   Place 0.2 mLs (2 mg) under the tongue every 4 hours May also give an additional 2 mg SL q 2 hours prn pain, or dyspnea with respirations greater than 20 per minute   Next Dose Due:  2/6: 5:40pm                                hypromellose-dextran Soln ophthalmic solution   Place 1-2 drops into both eyes every 8 hours as needed for dry eyes (following extubation)   Next Dose Due:  Available when needed                                   LORazepam 2 MG/ML (HIGH CONC) solution   Commonly known as:  LORazepam INTENSOL   Place 0.5 mLs (1 mg) under the  tongue every 4 hours   Last time this was given:  1 mg on 2/6/2018 12:11 PM   Next Dose Due:  2/6: 4:11pm                                MEDICATION INSTRUCTION   If care facility cannot accept or use ranges, facility is instructed to use lower end of dosing range

## 2018-02-03 NOTE — IP AVS SNAPSHOT
` [place] [address] MEDICAL SURGICAL: [phone]                                              INTERAGENCY TRANSFER FORM - NURSING   2/3/2018                    Hospital Admission Date: 2/3/2018  KARLI BUTTERFIELD   : 10/6/1931  Sex: Male        Attending Provider: Adri Ventura MD     Allergies:  Asa [Aspirin], Cephalosporins, Diphenhydramine, Metformin, Penicillins, Strawberry, Pravastatin    Infection:  None   Service:  X    Ht:  1.829 m (6')   Wt:  80.7 kg (177 lb 14.6 oz)   Admission Wt:  81.1 kg (178 lb 12.7 oz)    BMI:  24.13 kg/m 2   BSA:  2.02 m 2            Patient PCP Information     Provider PCP Type    [facility]      Current Code Status     Date Active Code Status Order ID Comments User Context       Prior      Code Status History     Date Active Date Inactive Code Status Order ID Comments User Context    2018 10:35 AM  Special Code [id number] Comfort care / Hospice to consult. Sailaja Johnson MD Outpatient    2018 10:50 AM 2018 10:35 AM DNR/DNI [id number] Code status discussion is appropriately documented in the chart. Manny Joy MD Inpatient    2018 10:46 AM 2018 10:50 AM DNR/DNI [id number] Place note in progress notes. Code status discussion is appropriately documented in the chart. Manny Joy MD Inpatient    2/3/2018 11:26 PM 2018 10:46 AM DNR/DNI [id number]  Adri Ventura MD Inpatient    2017  8:07 AM 1/3/2018  4:22 PM Full Code [id number]  Manny Joy MD Inpatient    8/10/2016 12:26 PM 2017  8:07 AM Full Code [id number]  Giuliano Ramirez MD Outpatient    2016  1:43 AM 8/10/2016 12:26 PM Full Code [id number]  Jluis Thrasher MD ED      Advance Directives        Scanned docmt in ACP Activity?           Yes, scanned ACP docmt        Hospital Problems as of 2018              Priority Class Noted POA    Septic shock (H) Medium  2/3/2018 Yes      Non-Hospital Problems as of  2/6/2018              Priority Class Noted    Altered mental status Medium  8/6/2016    CVA (cerebral vascular accident) (H) Medium  8/7/2016    Acute respiratory failure with hypoxia and hypercarbia (H) Medium  12/29/2017      Immunizations     None         END      ASSESSMENT     Discharge Profile Flowsheet     EXPECTED DISCHARGE     Referrals Placed  Emergent Admission to SNF/TCU 08/10/16 1521    [facility] Date  -- [facility] 02/05/18 1348   SKIN      DISCHARGE NEEDS ASSESSMENT     Inspection of bony prominences  Full 02/05/18 0052    Equipment Currently Used at Home  -- (Facility reports wheelchair and FWW) 01/02/18 1419   Procedural focused assessment (identify areas inspected)   Buttock, left;Buttock, right;Coccyx;Heel, left;Heel, right 02/04/18 1625    # of Referrals Placed by CTS  Hospice;Transportation 02/06/18 0945   Inspection under devices  Full 02/05/18 0052    GASTROINTESTINAL (ADULT,PEDIATRIC,OB)     Skin WDL  ex 02/05/18 0052    GI WDL  ex 02/05/18 1704   Skin Color/Characteristics  bruised (ecchymotic) 02/05/18 0052    All Quadrants Bowel Sounds  hypoactive 02/04/18 1226   Skin Temperature  cool 02/05/18 0052    Last Bowel Movement  02/05/18 02/05/18 1220   Skin Moisture  dry 02/05/18 0052    GI Signs/Symptoms  fecal incontinence 02/05/18 1704   Skin Elasticity  slow return to original state 02/05/18 0052    COMMUNICATION ASSESSMENT     Skin Integrity  bruise(s) (red scrotum/buttocks) 02/05/18 1220    Patient's communication style  spoken language (English or Bilingual) 02/03/18 1820   SAFETY      FINAL RESOURCES     Safety WDL  WDL 02/06/18 0812    Resources List  Skilled Nursing Facility 01/03/18 1143   Safety Factors  bed in low position;wheels locked;ID band on 02/06/18 0812    PAS Number  [id number] 08/10/16 1521   Safety Equipment  oxygen flowmeter;suction regulator;suction equipment 02/05/18 0052    Senior Linkage Line Referral Placed  08/10/16 08/10/16  "1521   All Alarms  alarm(s) activated and audible 02/06/18 0812                 Assessment WDL (Within Defined Limits) Definitions           Safety WDL     Effective: 09/28/15    Row Information: <b>WDL Definition:</b> Bed in low position, wheels locked; call light in reach; upper side rails up x 2; ID band on<br> <font color=\"gray\"><i>Item=AS safety wdl>>List=AS safety wdl>>Version=F14</i></font>      Skin WDL     Effective: 09/28/15    Row Information: <b>WDL Definition:</b> Warm; dry; intact; elastic; without discoloration; pressure points without redness<br> <font color=\"gray\"><i>Item=AS skin wdl>>List=AS skin wdl>>Version=F14</i></font>      Vitals     Vital Signs Flowsheet     VITAL SIGNS     Total  0 02/04/18 1824    Temp  102  F (38.9  C) 02/04/18 1422   ANALGESIA SIDE EFFECTS MONITORING      Temp src  Bladder 02/04/18 1210   Side Effects Monitoring: Respiratory Quality  L 02/06/18 0526    Resp  26 02/06/18 1032   Side Effects Monitoring: Respiratory Depth  S 02/06/18 0526    Pulse  117 02/06/18 0810   Side Effects Monitoring: Sedation Level  1 02/06/18 0526    Heart Rate  120 02/06/18 1340   HEIGHT AND WEIGHT      Pulse/Heart Rate Source  Monitor 02/04/18 1241   Height  1.829 m (6') 02/04/18 0013    BP  97/60 02/04/18 1422   Weight  80.7 kg (177 lb 14.6 oz) 02/04/18 0013    BP Location  Left arm 02/04/18 0013   BSA (Calculated - sq m)  2.02 02/04/18 0013    OXYGEN THERAPY     BMI (Calculated)  24.18 02/04/18 0013    SpO2  97 % 02/06/18 1340   VANESA COMA SCALE      O2 Device  None (Room air) 02/06/18 1340   Best Eye Response  1-->(E1) none 02/04/18 1421    FiO2 (%)  35 % 02/04/18 1241   Best Motor Response  4-->(M4) withdraws from pain 02/04/18 1421    Oxygen Delivery  15 LPM 02/03/18 1814   Best Verbal Response  1-->(V1) none 02/04/18 1421    PAIN/COMFORT     Vanesa Coma Scale Score  6 02/04/18 1421    Patient Currently in Pain  denies 02/04/18 0433   EKG MONITORING      Preferred Pain Scale  rFLACC " (Revised Face Legs Arms Cry Consolability Scale) 02/05/18 0045   Cardiac Regularity  Irregular 02/06/18 0336    rFLACC Pain Rating: Face  0-->no particular expression or smile 02/06/18 0810   Cardiac Rhythm  Other (Comment) (SD with tachycardia and PVC's) 02/03/18 2127    rFLACC Pain Rating - Legs  1-->uneasy, restless, tense, occasional tremors 02/06/18 0810   POSITIONING      rFLACC Pain Rating: Activity  0-->lying quietly, normal position, moves easily 02/06/18 0810   Body Position  side-lying, left 02/06/18 0830    rFLACC Pain Rating - Cry  0-->no cry (awake or asleep) 02/06/18 0810   Head of Bed (HOB)  HOB at 20-30 degrees 02/05/18 1837    rFLACC Pain Rating - Consolability  0-->content, relaxed 02/06/18 0810   Positioning/Transfer Devices  pillows 02/05/18 1837    rFLACC Score: Activity  1 02/06/18 0810   DAILY CARE      Pain Intervention(s)  Medication (See eMAR) 02/05/18 1836   Activity Management  bedrest 02/06/18 0812    Response to Interventions  Absence of nonverbal indicators of pain 02/05/18 2030   Activity Assistance Provided  assistance, 2 people 02/06/18 0812    CRITICAL-CARE PAIN OBSERVATION TOOL (CPOT)     ECG      Facial Expression  0 02/04/18 1824   ECG Rhythm  Sinus tachycardia 02/04/18 1424    Body Movements  0 02/04/18 1824   Ectopy  PVC 02/04/18 1214    Compliance w/ventilator (intubated patients)  Extubated 02/04/18 1824   Ectopy Frequency  Occasional 02/04/18 1214    Vocalization (extubated patients)  0 02/04/18 1824   Lead Monitored  Lead II;V 1 02/04/18 1214    Muscle Tension  0 02/04/18 1824                 Patient Lines/Drains/Airways Status    Active LINES/DRAINS/AIRWAYS     Name: Placement date: Placement time: Site: Days: Last dressing change:    Urethral Catheter Coude;Latex;Temperature probe 16 fr 02/03/18   1905   Coude;Latex;Temperature probe   2             Patient Lines/Drains/Airways Status    Active PICC/CVC     None            Intake/Output Detail Report     Date Intake      Output Net    Shift P.O. I.V. IV Piggyback Total Urine Total       Noc 02/04/18 2300 - 02/05/18 0659 -- 109 -- 109 200 200 -91    Day 02/05/18 0700 - 02/05/18 1459 0 91.2 -- 91.2 350 350 -258.8    Mirlande 02/05/18 1500 - 02/05/18 2259 -- -- -- -- 450 450 -450    Noc 02/05/18 2300 - 02/06/18 0659 -- 159 -- 159 400 400 -241    Day 02/06/18 0700 - 02/06/18 1459 -- -- -- -- 325 325 -325      Last Void/BM       Most Recent Value    Urine Occurrence 2 at 02/03/2018 1900    Stool Occurrence 2 at 02/05/2018 1321      Case Management/Discharge Planning     Case Management/Discharge Planning Flowsheet     REFERRAL INFORMATION     COPING/STRESS      Did the Initial Social Work Assessment result in a Social Work Case?  Yes 02/05/18 1525   Major Change/Loss/Stressor  none 12/29/17 1136    Admission Type  inpatient 02/05/18 1525   EXPECTED DISCHARGE      Arrived From  nursing facility 02/05/18 1525   Expected Discharge Date  -- (TBD > /from [facility]?) 02/05/18 1348    # of Referrals Placed by [facility]  Hospice;Transportation 02/06/18 0945   FINAL RESOURCES      Reason For Consult  end of life/hospice 02/05/18 1525   Equipment Currently Used at Home  -- (Facility reports wheelchair and FWW) 01/02/18 1419    Record Reviewed  clinical discipline documentation 02/05/18 1525   Resources List  Skilled Nursing Facility 01/03/18 1143    CTS Assigned to Yusef Mendez RN CTS 02/06/18 0945   PAS Number  [id number] 08/10/16 1521    Primary Care Clinic Name  [facility] 02/05/18 1525   Senior Linkage Line Referral Placed  08/10/16 08/10/16 1521    LIVING ENVIRONMENT     Referrals Placed  Emergent Admission to SNF/TCU 08/10/16 1521    Lives With  facility resident 01/02/18 1400   ABUSE RISK SCREEN      Living Arrangements  assisted living 02/05/18 1525   QUESTION TO PATIENT:  Has a member of your family or a partner(now or in the past) intimidated, hurt, manipulated, or controlled you in any way?  no 02/03/18 1827    ASSESSMENT  OF FAMILY/SOCIAL SUPPORT     QUESTION TO PATIENT: Do you feel safe going back to the place where you are living?  yes 02/03/18 1827    Marital Status   02/05/18 1525   OBSERVATION: Is there reason to believe there has been maltreatment of a vulnerable adult (ie. Physical/Sexual/Emotional abuse, self neglect, lack of adequate food, shelter, medical care, or financial exploitation)?  no 02/03/18 1827    Who is your support system?  Wife;Children 02/05/18 1525   HOMICIDE RISK      Spouse's Name  -- (Yu) 02/05/18 1525   Feels Like Hurting Others  no 02/03/18 1827    Description of Support System  Supportive;Involved 02/05/18 1525

## 2018-02-03 NOTE — IP AVS SNAPSHOT
[place] [address] MEDICAL SURGICAL: [phone]                                              INTERAGENCY TRANSFER FORM - PHYSICIAN ORDERS   2/3/2018                    Hospital Admission Date: 2/3/2018  KARLI BUTTERFIELD   : 10/6/1931  Sex: Male        Attending Provider: Adri Ventura MD     Allergies:  Asa [Aspirin], Cephalosporins, Diphenhydramine, Metformin, Penicillins, Strawberry, Pravastatin    Infection:  None   Service:  X    Ht:  1.829 m (6')   Wt:  80.7 kg (177 lb 14.6 oz)   Admission Wt:  81.1 kg (178 lb 12.7 oz)    BMI:  24.13 kg/m 2   BSA:  2.02 m 2            Patient PCP Information     Provider PCP Type    [facility]      ED Clinical Impression     Diagnosis Description Comment Added By Time Added    Acute respiratory failure with hypoxia and hypercarbia (H) [J96.01, J96.02] Acute respiratory failure with hypoxia and hypercarbia (H) [J96.01, J96.02]  Rufina Holland MD 2018 12:38 AM    Severe sepsis (H) [A41.9, R65.20] Severe sepsis (H) [A41.9, R65.20]  Rufina Holland MD 2018 12:38 AM    Pneumonia of left lower lobe due to infectious organism (H) [J18.1] Pneumonia of left lower lobe due to infectious organism (H) [J18.1]  Rufina Holland MD 2018 12:38 AM    Hypoxia [R09.02] Hypoxia [R09.02]  Rufina Holland MD 2018 12:39 AM    Tachycardia [R00.0] Tachycardia [R00.0]  Rufina Holland MD 2018 12:39 AM    Acidosis [E87.2] Acidosis [E87.2]  Rufina Holland MD 2018 12:39 AM    Acute cystitis without hematuria [N30.00] Acute cystitis without hematuria [N30.00]  Rufina Holland MD 2018 12:39 AM    Acute renal failure, unspecified acute renal failure type (H) [N17.9] Acute renal failure, unspecified acute renal failure type (H) [N17.9]  Rufina Holland MD 2018 12:47 AM      Hospital Problems as of 2018              Priority Class Noted POA    Septic shock (H) Medium  2/3/2018 Yes      Non-Hospital  Problems as of 2/6/2018              Priority Class Noted    Altered mental status Medium  8/6/2016    CVA (cerebral vascular accident) (H) Medium  8/7/2016    Acute respiratory failure with hypoxia and hypercarbia (H) Medium  12/29/2017      Code Status History     Date Active Date Inactive Code Status Order ID Comments User Context    2/6/2018 10:35 AM  Special Code [id number] Comfort care / Hospice to consult. Sailaja Johnson MD Outpatient    2/4/2018 10:50 AM 2/6/2018 10:35 AM DNR/DNI [id number] Code status discussion is appropriately documented in the chart. Manny Joy MD Inpatient    2/4/2018 10:46 AM 2/4/2018 10:50 AM DNR/DNI [id number] Place note in progress notes. Code status discussion is appropriately documented in the chart. Manny Joy MD Inpatient    2/3/2018 11:26 PM 2/4/2018 10:46 AM DNR/DNI [id number]  Adri Ventura MD Inpatient    12/29/2017  8:07 AM 1/3/2018  4:22 PM Full Code [id number]  Manny Joy MD Inpatient    8/10/2016 12:26 PM 12/29/2017  8:07 AM Full Code [id number]  Giuliano Ramirez MD Outpatient    8/6/2016  1:43 AM 8/10/2016 12:26 PM Full Code [id number]  Jluis Thrasher MD ED         Medication Review      START taking        Dose / Directions Comments    acetaminophen 650 MG Suppository   Commonly known as:  TYLENOL   Used for:  End of life care   Replaces:  TYLENOL PO        Dose:  650 mg   Place 1 suppository (650 mg) rectally every 6 hours   Quantity:  12 suppository   Refills:  0    All meds need to be bubble wrapped.       atropine 1 % ophthalmic solution   Used for:  End of life care        Dose:  1-2 drop   Place 1-2 drops under the tongue every hour as needed for other (secretions)   Quantity:  1 Bottle   Refills:  0    All meds need to be bubble wrapped.       bisacodyl 10 MG Suppository   Commonly known as:  DULCOLAX   Used for:  End of life care        Unwrap and insert 1 suppository rectally twice daily as needed for constipation.    Quantity:  1 suppository   Refills:  0    All meds to be bubble wrapped.       haloperidol 2 MG/ML (HIGH CONC) solution   Commonly known as:  HALDOL   Used for:  End of life care        Dose:  0.5-1 mg   Take 0.25-0.5 mLs (0.5-1 mg) by mouth, place under tongue or insert rectally every 6 hours as needed for agitation (nausea)   Quantity:  30 mL   Refills:  0    All meds need to be bubble wrapped.       HYDROmorphone (HIGH CONC) 10 mg/mL Liqd oral   Commonly known as:  DILAUDID   Used for:  End of life care        Dose:  2 mg   Place 0.2 mLs (2 mg) under the tongue every 4 hours May also give an additional 2 mg SL q 2 hours prn pain, or dyspnea with respirations greater than 20 per minute   Quantity:  30 mL   Refills:  0    All meds need to be bubble wrapped.       hypromellose-dextran Soln ophthalmic solution   Used for:  End of life care        Dose:  1-2 drop   Place 1-2 drops into both eyes every 8 hours as needed for dry eyes (following extubation)   Quantity:  1 Bottle   Refills:  0    All meds need to be bubble wrapped.       LORazepam 2 MG/ML (HIGH CONC) solution   Commonly known as:  LORazepam INTENSOL   Used for:  End of life care        Dose:  1 mg   Place 0.5 mLs (1 mg) under the tongue every 4 hours   Quantity:  15 mL   Refills:  0    All meds need to be bubble wrapped.       MEDICATION INSTRUCTION   Used for:  End of life care        If care facility cannot accept or use ranges, facility is instructed to use lower end of dosing range   Refills:  0          CONTINUE these medications which may have CHANGED, or have new prescriptions. If we are uncertain of the size of tablets/capsules you have at home, strength may be listed as something that might have changed.        Dose / Directions Comments    albuterol (2.5 MG/3ML) 0.083% neb solution   This may have changed:  Another medication with the same name was removed. Continue taking this medication, and follow the directions you see here.        Dose:   2.5 mg   Take 2.5 mg by nebulization every 4 hours as needed for shortness of breath / dyspnea or wheezing   Refills:  0          STOP taking     GABAPENTIN PO           metFORMIN 500 MG tablet   Commonly known as:  GLUCOPHAGE           PLAVIX PO           PREDNISONE PO           pyridostigmine 180 MG CR tablet   Commonly known as:  MESTINON           TYLENOL PO   Replaced by:  acetaminophen 650 MG Suppository                   After Care     Activity - Up with nursing assistance           Advance Diet as Tolerated       Follow this diet upon discharge: Has been NPO in hospital.       Fall precautions           General info for SNF       Length of Stay Estimate: Short Term Care: Estimated # of Days <30  Condition at Discharge: Terminal  Level of care:board and care  Rehabilitation Potential: Poor  Admission H&P remains valid and up-to-date: Yes  Recent Chemotherapy: N/A  Use Nursing Home Standing Orders: Yes       Mantoux instructions       Give two-step Mantoux (PPD) Per Facility Policy Yes if needed.             Follow-Up Appointment Instructions     Future Labs/Procedures    Follow Up and recommended labs and tests     Comments:    Hospice consult.      Follow-Up Appointment Instructions     Follow Up and recommended labs and tests       Hospice consult.             Statement of Approval     Ordered          02/06/18 1120  I have reviewed and agree with all the recommendations and orders detailed in this document.  EFFECTIVE NOW     Approved and electronically signed by:  Sailaja Johnson MD           02/06/18 1035  I have reviewed and agree with all the recommendations and orders detailed in this document.  EFFECTIVE NOW     Approved and electronically signed by:  Sailaja Johnson MD

## 2018-02-04 NOTE — PLAN OF CARE
Problem: Pneumonia (Adult)  Goal: Signs and Symptoms of Listed Potential Problems Will be Absent, Minimized or Managed (Pneumonia)  Signs and symptoms of listed potential problems will be absent, minimized or managed by discharge/transition of care (reference Pneumonia (Adult) CPG).  Outcome: No Change  ICU End of Shift Summary.  For vital signs and complete assessments, please see documentation flowsheets.     Pertinent assessments: Pt arrived from ER about 2300 last evening, on bipap at 70 %, lungs dim, sats 95 %, very lethargic, barely followed simple commands with his hands, pt didn't speak even when asked a question, was in a S dysrhythmia, at 0500 changed to a SR 1st degree AVB, VSS, temp 100.9 per bladder probe, only 5 ml urine for the next hr, md wanted pt to take oral pill mestinon, tried to get pt to wake up but was unable, later tried again, pt did wake up better, tried a sip of water first, pt tried to swallow but coughed and sputtered, applesauce was tried, it takes pt a while before he swallows, he was able to swallow the pill with the apple sauce but again it takes a while for him to be able to swallow, it is hard to tell if he really does swallow, b/p was a little soft and uo con't to be low, tele hub was notified and norepi., was started, VSS.  Major Shift Events: soft b/p, swallow is poor, uo very low.  Plan (Upcoming Events): con't norepi.,assess uo and resp status.   Discharge/Transfer Needs: TBD    Bedside Shift Report Completed : yes  Bedside Safety Check Completed: yes

## 2018-02-04 NOTE — PROGRESS NOTES
Per Dr. Pabon, picc line is in good position.  OK to use for medications, labs and power injection.

## 2018-02-04 NOTE — PHARMACY-ADMISSION MEDICATION HISTORY
Admission medication history interview status for this patient is complete. See [facility] admission navigator for allergy information, prior to admission medications and immunization status.     Medication history interview source(s):none  Medication history resources (including written lists, pill bottles, clinic record):Александр STEARNS  Primary pharmacy:    Changes made to PTA medication list:  Added: none  Deleted: none  Changed: none    Actions taken by pharmacist (provider contacted, etc):None     Additional medication history information:None    Medication reconciliation/reorder completed by provider prior to medication history? No    Do you take OTC medications (eg tylenol, ibuprofen, fish oil, eye/ear drops, etc)? no(Y/N)    For patients on insulin therapy: NO (Y/N)  Lantus/levemir/NPH/Mix 70/30 dose:   (Y/N) (see Med list for doses)   Sliding scale Novolog Y/N  If Yes, do you have a baseline novolog pre-meal dose:  units with meals  Patients eat three meals a day:   Y/N    How many episodes of hypoglycemia do you have per week: _______  How many missed doses do you have per week: ______  How many times do you check your blood glucose per day: _______   Any Barriers to therapy - Be specific :  cost of medications, comfortable with giving injections (if applicable), comfortable and confident with current diabetes regimen: Y/N ______________      Prior to Admission medications    Medication Sig Last Dose Taking? Auth Provider   metFORMIN (GLUCOPHAGE) 500 MG tablet Take 1 tablet (500 mg) by mouth 2 times daily (with meals) 2/3/2018 at 1700 Yes Berny Vázquez MD   albuterol (2.5 MG/3ML) 0.083% neb solution Take 2.5 mg by nebulization every 4 hours as needed for shortness of breath / dyspnea or wheezing  Yes Unknown, Entered By History   albuterol (PROAIR HFA/PROVENTIL HFA/VENTOLIN HFA) 108 (90 BASE) MCG/ACT Inhaler Inhale 2 puffs into the lungs every 4 hours as needed for shortness of breath / dyspnea or  wheezing  Yes Unknown, Entered By History   Acetaminophen (TYLENOL PO) Take 1,000 mg by mouth every 8 hours 2/3/2018 at 0800 Yes Unknown, Entered By History   Acetaminophen (TYLENOL PO) Take 1,000 mg by mouth daily as needed for mild pain or fever 2/3/2018 at Unknown time Yes Unknown, Entered By History   Clopidogrel Bisulfate (PLAVIX PO) Take 75 mg by mouth daily  2/3/2018 at 0800 Yes Reported, Patient   pyridostigmine (MESTINON) 180 MG CR tablet Take 180 mg by mouth 2 times daily  2/3/2018 at 0800 Yes Reported, Patient   GABAPENTIN PO Take 300 mg by mouth At Bedtime 2/2/2018 at 2000 Yes Unknown, Entered By History   PREDNISONE PO Take 5 mg by mouth daily  2/3/2018 at 0800 Yes Reported, Patient   GABAPENTIN PO Take 600 mg by mouth daily  2/3/2018 at 0800 Yes Reported, Patient

## 2018-02-04 NOTE — PROGRESS NOTES
Placed pt on BIPAP 12/6, RR 10, 70% FiO2 for resp distress, altered mental status and hypoxia. SpO2 improves to mid 90's, BS diminished with scattered coarse. Will continue to monitor pt's respiratory status closely.    Nilda Saavedra, RT  2/3/2018 6:34 PM

## 2018-02-04 NOTE — PLAN OF CARE
Problem: Pneumonia (Adult)  Goal: Signs and Symptoms of Listed Potential Problems Will be Absent, Minimized or Managed (Pneumonia)  Signs and symptoms of listed potential problems will be absent, minimized or managed by discharge/transition of care (reference Pneumonia (Adult) CPG).   Outcome: Declining  Pt extremely stuporous this a.m. On bi-pap. Arouses to pain stimuli opens eyes to voice and follows hand grasps but drifts right off to sleep. Pt ACOSTA. Continues to breath shallow on bi-pap. Pt temp is climbing. IV tylenol given as ordered. Other VSS. Pt is DNR/DNI. MD notified and at bedside to assess. See flowsheet for additional assessment information.

## 2018-02-04 NOTE — PROGRESS NOTES
ICU Attending Note    I have seen and examined Mariano Clemente, reviewed the patient's history, pertinent labs, vital signs, medications, physical exam, and radiographs.  The patient is critically ill by my examination and requires continued ICU monitoring and cares    Mariano Clemente is admitted to ICU with fever, hypoxia and respiratory distress after presenting by EMS from nursing home.  He was hospitalized last month with similar symptoms and diagnosed with influenza A.  He received BIPAP, which stabilized his situation.    His past history is significant for myasthenia gravis, diabetes, history of stroke, and hypertension..    Recent Events:  SpO2 are good on BIPAP.  Interaction is minimal, which by previous notes is consistent.  Lactate elevated, responded to fluid.  Glucose high.  Yesterday's blood cultures reported positive for GNR today.    Exam:  Temp:  [99.1  F (37.3  C)-102.4  F (39.1  C)] 101.1  F (38.4  C)  Pulse:  [124-155] 124  Heart Rate:  [] 100  Resp:  [17-48] 25  BP: ()/() 84/49  FiO2 (%):  [40 %-70 %] 40 %  SpO2:  [86 %-100 %] 97 %    Intake/Output Summary (Last 24 hours) at 02/04/18 0939  Last data filed at 02/04/18 0900   Gross per 24 hour   Intake          6034.51 ml   Output               95 ml   Net          5939.51 ml     FiO2 (%): 40 % (weaned to 35%)  Resp: 25    Lungs clear  Heart reg  abd soft, nontender, nondistended  Ext warm, no edema    cxr yesterday times 2.  Subtle retrocardiac opacity.  Rest is clear.    Results:  ABG No lab results found in last 7 days.  CBC  Recent Labs  Lab 02/04/18  0500 02/03/18  1812 02/03/18  1810   WBC 35.7*  --  8.5   HGB 10.1* 14.3 13.5   HCT 31.9*  --  43.6     --  322     BMP  Recent Labs  Lab 02/04/18  0500 02/04/18  0200 02/03/18  1812 02/03/18  1810    141 140 139   POTASSIUM 3.8 3.9 4.3 4.3   CHLORIDE 110* 110* 110* 108   CO2 22 20  --  15*   BUN 48* 47* 41* 48*   CR 2.03* 1.97*  --  1.70*   * 253* 253* 254*      LFT  Recent Labs  Lab 02/03/18  1810   AST 10   ALT 11   ALKPHOS 340*   BILITOTAL 0.8   ALBUMIN 2.5*   INR 1.19*     PancreasNo lab results found in last 7 days.  INR  Lab Results   Component Value Date    INR 1.19 02/03/2018    INR 1.06 12/29/2017       Current Issues:  1.  Severe sepsis from bacteremia: source is unclear.  CXRs are not impressive.  Urine is nitrite negative, LE positive with many bacteria.  Could be urine.  On meropenem.    2.  Hypoxic respiratory failure: responding to bipap, continue for now.  Needs goals of care clarified. Pt is DNR/DNI, which I think is reasonable considering age and concurrent disease.    3. Protein calorie malnutrition: need to consider feeding if process continues    4.  UTI: as above, continue antibiotics    5.  Acute renal failure: Received significant IVF yesterday.  Probably had some ATN.  Cr up to 2.03  Follow.    6.  Myasthenia gravis: needs pyrostigmine and may need feeding tube to deliver    7.  Significant leukocytosis: from GNR bacteremia?  follow    Pt is DNR/DNI.  See Dr. Ventura's note for discussion with wife.    Evaluation and management time exclusive of procedures was 30 minutes critical care time including:  examination with the ICU team, discussion of the patient's condition with other physicians and members of the care team, reviewing all data related to the patient, and time utilizing the EMR for documentation of this patient's care.    West Chang MD  Acute Care Surgery/Critical Care

## 2018-02-04 NOTE — PROGRESS NOTES
Critical result of Blood cultures from left hand on 2/3/18 at 1815 resulted gram negative rods.  Critical results of blood cultures from 2/3/18 on right hands resulted gram negative rods.  Results read back to  and given to Dr. Joy. No additional orders obtained at this time. See flowsheet for additional assessment information.

## 2018-02-04 NOTE — ED NOTES
Minimal urine output, (25 mL) since 4 L of fluids and BP's trending down. MD notified. Orders for 1 L bolus LR.

## 2018-02-04 NOTE — PROGRESS NOTES
Procalcitonin resulted > 200, results given to Dr. Joy. Pt is to be transitioned to comfort cares in setting of acute sepsis. see flowsheet for additional assessment information.

## 2018-02-04 NOTE — ED PROVIDER NOTES
"  History     Chief Complaint:  Altered Mental Status & Hypoxia    The history is provided by the EMS personnel. The history is limited by the condition of the patient.      Mariano Clemente is an anticoagulated 86 year old male, DNI status, on Plavix who presents via EMS with altered mental status and hypoxia. Staff at his care facility contacted EMS due to patient having altered mental status and profound weakness. On EMS arrival patient had fever at 104, 77% oxygen saturation on room air, and heart rate 160. Lungs sounded like \"crud\" per EMS. Blood pressures remained around 150/80 en route to the hospital. 18 gauge IV established en route. Blood sugar 289. Per staff patient is normally quiet but alert.     Hospital Course 12/29/17-1/3/18:  HOSPITAL COURSE:     1.  Acute hypoxic respiratory failure secondary to influenza A.  The patient was admitted to the Intensive Care Unit with BiPAP.  He was treated with Tamiflu and gradually improved.  He did not require intubation.     2.  The patient did not appear to have a myasthenia gravis exacerbation this admission.  Neurology saw the patient in regard to his myasthenia history, and they recommended prednisone 10 mg daily while in hospital.  The patient will transition back to his usual dose of 5 mg daily on discharge from the hospital.       Allergies:  Asa [Aspirin]  Cephalosporins  Diphenhydramine  Metformin  Penicillins  Strawberry  Pravastatin     Medications:    Metformin  Albuterol neb/inhaler  Plavix  Mestinon  Gabapentin  Prednisone    Past Medical History:    Diabetes  Myasthenia gravis  CVA  DNI    Past Surgical History:    History reviewed. No pertinent surgical history.     Family History:    History reviewed. No pertinent family history.      Social History:  Presents via EMS    Tobacco use: Former smoker  Alcohol use: Negative   PCP: Drew Weldon    Marital Status:      Review of Systems   Unable to perform ROS: Patient nonverbal "     Physical Exam     Patient Vitals for the past 24 hrs:   BP Temp Temp src Pulse Heart Rate Resp SpO2 Height Weight   02/03/18 2315 96/64 100.6  F (38.1  C) Bladder - 103 - 98 % - -   02/03/18 2310 - - - - - - 98 % - -   02/03/18 2245 103/58 100.8  F (38.2  C) - - 114 26 99 % - -   02/03/18 2230 93/51 100.8  F (38.2  C) - - 110 22 99 % - -   02/03/18 2215 107/53 - - - 110 26 99 % - -   02/03/18 2200 103/60 100.6  F (38.1  C) - - 105 21 100 % - -   02/03/18 2145 110/53 100.8  F (38.2  C) - - 109 22 100 % - -   02/03/18 2130 105/64 101.3  F (38.5  C) - - 109 19 100 % - -   02/03/18 2115 99/59 101.5  F (38.6  C) - - 114 21 99 % - -   02/03/18 2100 96/61 101.3  F (38.5  C) - - 117 26 98 % - -   02/03/18 2056 - 101.3  F (38.5  C) - - 115 24 99 % - -   02/03/18 2045 103/60 - - - 112 24 99 % - -   02/03/18 2000 102/61 - - - 122 17 99 % - -   02/03/18 1945 - 102.2  F (39  C) - - 121 26 100 % - -   02/03/18 1930 (!) 138/111 102.4  F (39.1  C) - - 128 26 99 % - -   02/03/18 1915 122/78 - - - - - 99 % 1.829 m (6') 81.1 kg (178 lb 12.7 oz)   02/03/18 1900 106/67 - - - 130 (!) 32 99 % - -   02/03/18 1845 - - - - - 30 100 % - -   02/03/18 1830 130/70 - - - 131 (!) 34 99 % - -   02/03/18 1815 - - - - - - 91 % - -   02/03/18 1814 - - - 124 - (!) 36 96 % - -   02/03/18 1809 (!) 163/93 101.8  F (38.8  C) Oral 155 - (!) 48 (!) 86 % - -      Physical Exam  Physical Exam   General: Resting on the bed.   Appears in acute distress  Ears, Nose, Throat:  External ears normal.  Nose normal. Dry MMM  Eyes:  Conjunctivae clear.  Pupils are equal, round, and reactive.   Neck: Normal range of motion.  Neck supple.   CV: Regular rate and rhythm.  No murmurs.      Respiratory:tachypnea with coarse diffuse breath sounds  Gastrointestinal: Soft.  No distension. There is no tenderness.   Neuro: Alert. Moving all extremities appropriately.  Short yes/no answers  Skin: Skin is warm and dry.  No rash noted.     Emergency Department Course   ECG  (18:10:32):  Rate 150 bpm. MD interval *. QRS duration 80. QT/QTc 298/470. P-R-T axes * -51 80. Sinus tachycardia. Left axis deviation. Low voltage QRS. ST depression, consider subendocardial injury. Changes noted above. Interpreted at 1849 by Rufina Holland MD.     Imaging:  Radiographic findings were communicated with the patient who voiced understanding of the findings.    XR Chest, portable, 1 view:  IMPRESSION: New patchy opacities medial left lung base that could be atelectasis or airspace disease. Right lung is clear. Normal cardiac silhouette.     Imaging independently reviewed and agree with radiologist interpretation.       Laboratory:  CBC: WNL (WBC 8.5, HGB 13.5, )    1812: ISTAT Basic Met Ica HCT POCT: Chloride 110 (H), CO2 16 (L), Glucose 253 (H), BUN 41 (H), Creatinine 2.0 (H), GFR 32 (L), Calcium ionized 4.1 (L) ow WNL    BMP: CO2 15 (L), AG 16 (H), Glucose 254 (H), BUN 48 (H), Creatinine 1.70 (H), GFR 38 (L) ow WNL      Recent Labs  Lab 02/03/18  1814 02/03/18  1812 02/03/18  1810   GLC  --  253* 254*   *  --   --       Hepatic panel: Bilirubin direct 0.3 (H), Albumin 2.5 (L), Alkphos 340 (H) ow AWNL  INR: 1.19 (H)  PTT: 34  Procalcitonin: 26.65 (HH)  Resulted at 1823: ISTAT gases lactate hector POCT: PCO2 23 (L), Bicarbonate 15 (L), Lactic acid 8.1 (HH) ow WNL   Lactic Acid (resulted at 1949): 9.7 (HH)   Lactic Acid (resulted 2051): 8.2 (HH)    Blood culture x2: pending    UA: Blood large, Albumin 100, Leuk esterase large, WBC>182, RBC>182, WBC clumps present, Bacteria many, Mucous present, Sperm present, o/w Negative   Urine culture: pending     Clostridium difficile toxin B PCR: Negative     Interventions:  1815: NS 1L IV Bolus   1900: Lactated ringers 2000 mL IV Bolus    1910: Meropenem 1 g IV  2101: Tylenol suppository 650 mg PO    2101: Lactated ringers IV Infusion  2118: Lactated ringers 1000 mL IV Bolus      Emergency Department Course:  1804: The patient arrived in the  emergency department via EMS.  1804: I performed an exam of the patient and obtained history, as documented above.  The patient was placed on oxygen via nasal cannula and continuous cardiac monitoring and pulse oximetry.   1805: Past medical records, nursing notes, and vitals reviewed.  1806: Called for portable XR. Oxygen saturation on oxygen via nasal cannula 84%, ordered BiPAP.   1807: Discussed antibiotic choice with pharmacy due to patient allergies.   1810: EKG obtained.   1812: BiPAP initiated.   1814: Lactic returned at 8.  1818: Portable CXR while in the emergency department, findings above.   1846: Discussed patient with Dr. Padilla of neurology.   Above interventions provided.   1927: Discussed the patient with Dr. Ventura of hospitalist service, who will admit the patient to an ICU bed for further monitoring, evaluation, and treatment.     1949: Second lactic resulted at 9.7.   1950: Discussed the increased lactic with Dr. Ventura of hospitalist service who recommends PICC placement and additional fluid resuscitation.   1952: Paged PICC line team.   Second IV established.   Additional interventions provided.     Impression & Plan      CMS Diagnoses:   The patient has signs of Septic Shock as evidenced by:    1. Presence of Sepsis, AND  2. Lactic Acid level >4    Time sepsis diagnosis confirmed = 1823 as this was the time whenLactate was resulted and the level was >4      3 Hour Septic Shock Bundle Completion:  1. Initial Lactic Acid Result:   Recent Labs   Lab Test  02/03/18 2041 02/03/18 1929 02/03/18   1813   LACT  8.2*  9.7*  8.1*     2. Blood Cultures before Antibiotics: Yes  3. Broad Spectrum Antibiotics Administered: Yes     Anti-infectives      Meropenem 1 g IV        4. 4000 ml of IV fluids.      6 Hour Severe Sepsis Bundle Completion:  1. Repeat Lactic Acid Level: 9.7, 8.2  2. MAP>65 after initial IVF bolus, will continue to monitor fluid status and vital signs  I attest to having performed  a repeat sepsis exam and assessment of perfusion at 1929 and the results demonstrate improved perfusion.              Medical Decision Making:  Mariano Clemente is a rather complication 86 year old male with history of myasthenia gravis who presents with hypoxia, fever, and cough. Vital signs show tachycardia to 150's initially with normal blood pressures and a temperature of 102.2, tachypnea also was noted initially into the 30's-40's which improved to 17's-20's. He was desaturating to 86% on oxygen mask. Broad different pursued, but not limited to, pneumonia, influenza, dehydration, electrolyte or metabolic abnormality, sepsis, septic shock, PE, etc. Overall patient appears ill with significantly increased work of breathing. His mentation is not great on initial presentation. Broad differential was initially pursued. Patient was quickly placed on BiPAP with oxygen saturations improving from mid 80's to mid 90's. His breathing also improved with this. He was rather rhonchorous and coarse on initial assessment concerning for pneumonia. Chest x-ray did confirm left lower lobe infiltrate which in the setting of a lactic of 8.1 is concerning for severe sepsis. Additionally, patient has acute kidney injury with elevated creatinine and BUN which I suspect is most likely prerenal in etiology. He also had a gap initially which is improved with fluid hydration secondary to his lactic acidosis. His urinalysis is also dirty but has signs of infection. Blood cultures obtained and pending. CBC shows no leukocytosis or anemia. INR mildly elevated in addition to mildly elevated alkphos, suspect this is secondary to severe sepsis at this time. Procal added on to investigate likely pneumonia and is significantly elevated. Clinically this appears most consistent with pneumonia given hypoxia, tachypnea, and coarse rhonchorous breath sounds. Meropenem was administered. He was maintained on BiPAP. Repeat lactic after 2 liters did not  improve and actually worsened to 9.7. An additional 2 liter bolus given and repeat lactate shows 8.2. Discussed with neurology, Dr. Padilla, given myasthenia gravis. They recommend 10 mg prednisone daily, up from 5 mg baseline. They did advise that antibiotics may worsen his myasthenia but given he appears to be in severe sepsis this is important. At this time he has had no abnormal blood pressures and is maintaining his blood pressures appropriately. His heart rate and other vital signs seem to be improving with supportive cares. Neurology recommended aggressive supportive cares at this time. Patient was discussed with the ICU attending who felt that a PICC line would be warranted given the lactate had worsened. Plan to repeat lactate after 2 more liters as noted above. Plan for PICC line placement. Patient has not required pressors but he will be monitored closely. Patient admitted to ICU in critical but stable condition.     Critical Care time:  was 35 minutes for this patient excluding procedures.    Diagnosis:    ICD-10-CM    1. Acute respiratory failure with hypoxia and hypercarbia (H) J96.01     J96.02    2. Severe sepsis (H) A41.9     R65.20    3. Pneumonia of left lower lobe due to infectious organism (H) J18.1    4. Hypoxia R09.02    5. Tachycardia R00.0    6. Acidosis E87.2    7. Acute cystitis without hematuria N30.00    8. Acute renal failure, unspecified acute renal failure type (H) N17.9        Disposition:  Admitted to hospitalist service.       I, Dipak Andrade, am serving as a scribe at 6:07 PM on 2/3/2018 to document services personally performed by Rufina Holland MD based on my observations and the provider's statements to me.    2/3/2018   [facility] EMERGENCY DEPARTMENT       Rufina Holland MD  02/04/18 0049

## 2018-02-04 NOTE — PROGRESS NOTES
RT Note:    Patient remained on BIPAP 12/6 45% throughout night. BS diminished, SPO2 %. RT will continue to follow.    Luzma Hein, RT 2/4/2018, 5:18 AM

## 2018-02-04 NOTE — H&P
Admitted:     02/03/2018      PRIMARY CARE PHYSICIAN:  Drew Weldon.      CHIEF COMPLAINT:  Altered mental status and fever.      HISTORY OF PRESENT ILLNESS:  This is an 86-year-old gentleman who comes into the hospital today with concerns for low oxygen and fever at his nursing care facility.  The case was discussed with the ER provider, Dr. Holland, who gives most of the history given patient's limited speech at this time.      This is a patient with history of myasthenia gravis.  He was recently hospitalized at this facility about a month ago with influenza infection and respiratory failure.  It looks like ultimately he did not require intubation and improved with BiPAP and was discharged home.      It appears that today around lunchtime or so, the patient developed a fever at his care facility.  Through the day he became more altered and more respiratory distress and EMS was called.  When EMS arrived, his oxygen saturation was 77% and a temperature of 104.  He was brought into the hospital and on arrival to the ER, his oxygen was still only 82%.  He was initially given high oxygen at 15 liters and for a few minutes placed on the BiPAP.  He was also quite altered and lethargic, although after an hour on the BiPAP he started to slowly become more awake and able to answer simple questions.  On the time of my interview, the patient is on the BiPAP.  He is sleeping.  I wake him up and he opens his eyes, he tells me he is not having any pain.  He states that his feeling is better and able to answer simple questions yes or no, but otherwise really unable to give any more history.  He does follow commands for me when I ask him to squeeze my hands and move his feet.  When I ask him about his code status and he if he would like to be placed on a breathing machine, he says no.      The patient was found to have a new infiltrate on the x-ray concerning for pneumonia.  His initial lactic acid was 8.1.  He was given 2 liters  of fluid and the repeat was eventually increased to 9.7, although his blood pressure is stable.  He is being currently admitted to the Intensive Care Unit in a critically ill condition.      PAST MEDICAL HISTORY:   1. Myasthenia gravis.   2. Diabetes mellitus.   3. Prior history of CVA.   4. Hypertension.      PAST SURGICAL HISTORY:   1. Jaw surgery.   2. Remote knee surgery.   3. Laparoscopic repair of hernia.      SOCIAL HISTORY:  The patient is .  He is currently a resident at a care facility.  Really unable to obtain any further details given his current condition.  He is a former smoker.      FAMILY HISTORY:  Unable to obtain given patient's current clinical condition and difficulty with speaking.      REVIEW OF SYSTEMS:  Pertinent positives and negatives are described in the HPI.  Otherwise, unable to obtain a comprehensive 10 point review of systems given the patient's current respiratory distress and difficulty with speaking.      CURRENT MEDICATIONS:  Awaiting final records from the pharmacy.   Prior to Admission medications    Medication Sig Last Dose Taking? Auth Provider   metFORMIN (GLUCOPHAGE) 500 MG tablet Take 1 tablet (500 mg) by mouth 2 times daily (with meals) 2/3/2018 at 1700 Yes Berny Vázquez MD   albuterol (2.5 MG/3ML) 0.083% neb solution Take 2.5 mg by nebulization every 4 hours as needed for shortness of breath / dyspnea or wheezing   Yes Unknown, Entered By History   albuterol (PROAIR HFA/PROVENTIL HFA/VENTOLIN HFA) 108 (90 BASE) MCG/ACT Inhaler Inhale 2 puffs into the lungs every 4 hours as needed for shortness of breath / dyspnea or wheezing   Yes Unknown, Entered By History   Acetaminophen (TYLENOL PO) Take 1,000 mg by mouth every 8 hours 2/3/2018 at 0800 Yes Unknown, Entered By History   Acetaminophen (TYLENOL PO) Take 1,000 mg by mouth daily as needed for mild pain or fever 2/3/2018 at Unknown time Yes Unknown, Entered By History   Clopidogrel Bisulfate (PLAVIX PO) Take  75 mg by mouth daily  2/3/2018 at 0800 Yes Reported, Patient   pyridostigmine (MESTINON) 180 MG CR tablet Take 180 mg by mouth 2 times daily  2/3/2018 at 0800 Yes Reported, Patient   GABAPENTIN PO Take 300 mg by mouth At Bedtime 2/2/2018 at 2000 Yes Unknown, Entered By History   PREDNISONE PO Take 5 mg by mouth daily  2/3/2018 at 0800 Yes Reported, Patient   GABAPENTIN PO Take 600 mg by mouth daily  2/3/2018 at 0800 Yes Reported, Patient           ALLERGIES:   1. ASPIRIN.   2. CEPHALOSPORINS.   3. BENADRYL.   4. METFORMIN   5. PENICILLINS.   6. STRAWBERRY.   7. PRAVASTATIN.      PHYSICAL EXAMINATION:   VITAL SIGNS:  Blood pressure is 102/61, heart rate is 120, oxygenation 99% on the BiPAP.  Temperature is 102.2, respiratory rate is 17.  Weight is 81 kg.   GENERAL APPEARANCE:  Elderly male lying in the bed on BiPAP, very tired appearing.   HEENT:  No obvious trauma to the head is noted.   NECK:  Supple.  Trachea is midline.   CARDIOVASCULAR:  He has a regular rate and rhythm, quite tachycardic, normal S1, S2.  No obvious murmurs are heard.   PULMONARY:  He has extensive crackles in the left lung field.  No wheezing, diminished air entry.   GASTROINTESTINAL:  Abdomen is soft, nontender, nondistended, positive bowel sounds, no rebound or guarding.   EXTREMITIES:  Warm and dry with signs of adequate perfusion.   MUSCULOSKELETAL:  No acute joint synovitis is noted.   NEUROLOGIC:  The patient is tired, but he wakes up easily.  Able to answer simple questions and is actually able to follow simple commands, able to squeeze both of my fingers, able to push down with his feet.  Appears diffusely weak.  He is able to shut his eyes pretty hard with decent strength of the extraocular muscles.  Otherwise, further neuro exam is limited given the patient's current condition.   PSYCHIATRIC:  Unable to assess at this time.   SKIN:  Warm and dry.  Some skin bruising, no obvious rashes are noted.  No cyanosis.      LABORATORY DATA AND  IMAGING:  Lab data and imaging results from today have been reviewed.  His CBC is reviewed and unremarkable.  BMP shows a creatinine of 1.7, bicarb of 15.  Lactic acid was 8.1 and repeat is 9.7.  His UA shows large number of white cells.  Blood cultures are pending.  X-ray shows a left lung medial infiltrate.  C. diff and the urine culture are pending as well.      ASSESSMENT AND PLAN:  This is an 86-year-old gentleman with a past medical history significant for myasthenia gravis, diabetes mellitus with recent hospital admission about a month ago to the ICU with acute influenza infection and respiratory failure requiring BiPAP, who now comes back to the hospital again with high grade fevers, hypoxemia.  His x-ray shows a new infiltrate, and being admitted to the hospital with concerns for respiratory failure and severe sepsis.   1. Severe sepsis and acute hypoxemic respiratory failure.  Suspect this is due to pulmonary infection.  He has high-grade fever, tachycardia.  X-ray shows a new infiltrate.  This could be a nosocomial pneumonia.  His urine also shows a lot of white cells and could be potentially a urinary source as well.        In terms of his breathing he is currently stable on the BiPAP.  His oxygenation is improving.  We will continue BiPAP treatment at this time and admit him to the Intensive Care Unit and monitor how he does on that.  As discussed below in code status, the patient and his wife both note he does not want intubation, which is consistent with his prior wishes as well.   2. Concerns for gram-negative pneumonia.  The patient is a resident of a [facility] facility and recently hospitalized as well.  He is at risk for gram-negative infections.  At this time, he will be covered with IV meropenem, which has been started in the ER.  I will hold on adding vancomycin or fluoroquinolones or other antibiotics given concerns for exacerbating his myasthenia gravis.  Certainly if his blood cultures show MRSA,  then we need to broaden his antibiotic accordingly.  However, at this time I have discussed with the ER provider and considering the different pros and cons, it seems reasonable to cover broadly with only meropenem for now.      Will follow blood cultures.  Obtain sputum cultures.   3. Possible urinary tract infection.  Follow final cultures.  Cover with meropenem for now.  No abdominal pain or any similar symptoms to suggest a structural issue.   4. Severe sepsis and concerns for early septic shock.  Based on patient's renal failure, acute encephalopathy, fevers, tachycardia.        The patient has currently marked lactic acidosis with a lactate up to 9.7.  The patient's lactic acid has gone up despite receiving 2 liters of fluid.  Asked the ER provider to give him 2 more liters of fluid bolus, get a Boyle in for accurate I and O measurement.  His blood pressure is holding okay for now, but I am concerned that this might start to drop further and requested a central line.  PICC stat is being called currently in the ER to get an urgent PICC line placed for this patient.    I will continue him on aggressive IV fluid resuscitation and recheck serial lactic acid overnight.  Clinically, he is looking better but despite that, the increasing lactic acid is concerning and will need to be closely monitored.   5. Diabetes mellitus.  The patient will be n.p.o. for now.  He will be on every 4 hour Accu-Cheks with sliding scale insulin.   6. Myasthenia gravis.  The patient is currently weaker; however, certainly in the setting of acute sepsis and infection difficult to assess.  The ER provider discussed the case with Neurology on-call who recommended doing IV Solu-Medrol 10 mg every 24 hours.  I will also continue him on his Pyridostigmine 180 mg b.i.d., if possible to get him with a small sip of water, if able to tolerate because of the BiPAP.  Certainly high dose steroids can sometimes likely exacerbate the myasthenia so will  hold on that unless he needs them for worsening sepsis.   7. Acute renal failure, acute encephalopathy.  Suspect this is all part of the acute sepsis picture.  Aggressive fluid resuscitation, follow mentation and recheck his BMP as through the night.   8. Prior history of stroke.  The patient is on Plavix, which will be continued from tomorrow morning if able to take that.   9. Deep venous thrombosis prophylaxis with heparin subcu.   10. Gastrointestinal prophylaxis.  Consider adding Protonix tomorrow if he is not able to eat much.      CODE STATUS:  I have discussed this with the patient.  Somewhat difficult to assess with his mentation, but he is able to answer simple questions and tells me no when I ask him about intubation and even for short-term.  I have also reviewed his previous POLST forms from 7/2017, which clearly mention that he is DNR/DNI.  I called his wife, Porsha, and talked to her about this over the phone.  She tells me very clearly today that she knows the patient is declining, he is 86 years old and she tells me that she does not want him to have resuscitation or placed on breathing machine or ventilators if he was to further decline.  She says that is something they have talked about a lot and every time Mariano comes to the hospital she talks to him about it, and he does not want these aggressive measures and wants to be allowed for natural death if he was to further decline.  We will honor the patient and his wife's wishes and his previous POLST forms and convert medical order with DNR/DNI.  Certainly depending on his further hospital course, palliative care consult could be considered.      At this time, this patient will be admitted to the Intensive Care Unit in a critically ill condition with respiratory failure, severe sepsis.      Total of 60 minutes of critical care time was spent in the care of this patient today including urgent evaluation in the ER, discussion with the ER provider,  patient's family to discuss goals of care, documentation and review of records.  Will also contact with the tele ICU and discuss the patient with them as well.         MARYCHUY IQBAL MD             D: 2018   T: 2018   MT: ALVARO      Name:     KARLI BUTTERFIELD   MRN:      [id number]        Account:      [id number]   :      10/06/1931        Admitted:     2018                   Document: [id number]

## 2018-02-04 NOTE — PROGRESS NOTES
[facility] PICC  Procedure Note           Peripherally Inserted Central Line Catheter (PICC):       Mariano Clemente  [id number]   February 4, 2018, 12:12 AM Indication: Hypotension  Laboratory sampling  Medication administration           Pause for the cause: Consent for catheter placement procedure signed  Time out completed  Patient ID's verified using two distinct indicators  All necessary equipment is present   Type of line to be used: PICC   Full barrier precautions used: Yes   Skin preparation: Chlorehexidine Gluconate 25% with isopropyl alcohol 70%   Date of insertion: February 3, 2018, 11:45 PM   Device type: Double lumen, valved, 5.0   Catheter brand: [employer] Solo Power   Lot number: [id number]   Insertion location: Right basilic vein   Method of placement: Venipuncture  MST  Ultrasound   Number of attempts: With ultrasound: 1   Without ultrasound: 0   Difficulty threading: No   Midline IV device: Na   Arm circumference: 29 cm measured 10 cm above Right AC   Midline extremity circumference: na cm   Vein diameter:  0.4 cm    Internal length: 47 cm   Midline visible catheter length: 0 cm (1 cm from zero to hub)   Total catheter length: 47 cm   Tip termination: SVC/RA   Method of verification: Chest x-ray   Midline patency post placement: Positive blood return  Flushes without difficulty  Saline locked   Line flush: Line flush documented on the eMAR yes   Placement verified by: Radiologist   Catheter placed by: Jeaneth Devine   Discontinuation form initiated: No   Patient tolerance: Tolerated well   PICC Educational information to patient (Information from PICC package):  No   VAD flushing orders entered:  Yes     Summary:  This procedure was performed without difficulty. There were no immediate complications.       Recorded by Jeaneth Mccollum RN    Attestation:  Amount of time performed on this procedure: 15 minutes.

## 2018-02-04 NOTE — PROGRESS NOTES
I received signout from the hospitalist.  This patient with myasthenia gravis is admitted after being confused and having a fever at his living facility.  New infiltrate on the chest x-ray, pyuria seen.  He is being treated with meropenem for its relative benefit related to myasthenia exacerbation.  We will monitor NIF 3 times daily.  He has a lactic acid of 9 despite appearing to be hemodynamically stable.  However he does take metformin.  He received 2 L of fluid prior to the recheck and is being given another 2 L of fluid.  If he remains with good urine output and adequate blood pressure, will continue to monitor the lactic acid only.  He has a polst with DNR/DNI.    Addendum: poor urine output with improving lactic acid, rising creatinine.  Initiate norepinephrine as needed to keep MAP > 65. Probable septic shock given procalcitonin, UTI, white blood cell count.

## 2018-02-04 NOTE — PROGRESS NOTES
[facility]  Hospitalist Progress Note  Manny Joy MD 02/04/2018    Reason for Stay (Diagnosis): fever, change in mental status and hypoxia.         Assessment and Plan:      Summary of Stay: Mariano Clemente is a 86 year old male who returned to the ED on 2/3/2018 due to fever, increased tremor, hypoxia and change in mental status.  In the emergency department patient was diagnosed with pneumonia based on the presence of the chest x-ray, hypoxia.  He also was noted to have markedly elevated lactic acid for which he was vigorously fluid resuscitated.  He was initiated on meropenem.  With his history of myasthenia gravis he was increased from 5 to 10 mg daily of prednisone-equivalent steroid.    Mr. Clemente currently resides at [facility].  Prior medical history is significant for myasthenia gravis as well as stroke, ataxia, type 2 diabetes, hypertension.  He most recently was hospitalized from December 29 to January 3, 2018 also with severe weakness, fever, cough and hypoxia.  Notably he had a urinary tract infection in addition to influenza A diagnosis.  He was also started on metformin at that time.  He was felt to not have had significant exacerbation of his myasthenia gravis.    Problem List:   1. Severe sepsis with acute hypoxic respiratory failure.  Infiltrate on chest x-ray is quite  unimpressive; urinalysis is strongly suggestive of acute infection.  Newly identified GNR bactermia is thought due to complicated UTI. Patient has been hemodynamically stable overnight and being managed empirically with meropenem.  2. Myasthenia gravis.  This is in addition to generalized weakness.  3. Acute renal failure probably multifactorial.   4. Anion gap metabolic acidosis with markedly elevated lactic acidosis.  This is probably due to recent initiation of metformin.  He has been aggressively fluid resuscitated having received 5 L of crystalloid in the 6 hours from presenting in the emergency  department to midnight.  5. Type 2 diabetes.  Last hemoglobin A1c 12/29/17 was 8.4.      PLAN:  After reviewing Dr. Ventura's notes I called the patient's wife and further clarified her understanding of the patient's wishes.  After about a 15-20 minute discussion, it became clear that the patient has been suffering significantly.  I indicated to the patient's wife that if she thought it was her 's wish, we could stop further supportive care and do our best to keep him comfortable though this would result in a high likelihood of him dying during this hospitalization.  She indicated that she thought that was most appropriate path.      I did call her son, Demetrius after Jhon could not be reached and confirmed with them that her wishes reflected what they understood of his wishes.  I then called his son Brant Clemente and left a message to call me back.  To his mother's I was able to reach Demetrius Orozco, the patient's stepson.  After he confirmed that his understanding was similar, that the patient indeed was suffering and did not want to go through more hospitalizations, he indicated that he wanted to come in to be at the bedside prior to withdrawal of support.    Approximately 2 PM I returned to the room and found both Demetrius Orozco and a grandchild of Mr. Clemente, who is here with his wife who is a CRNA.  They asked appropriate questions and fortunately, I believe, all seem to agree that comfort care was the most appropriate approach for this patient.  With this consent I entered orders for comfort care and instructed the nurse to remove the patient's BiPAP.  At the time of my evaluation he appeared quite comfortable.    Antibiotics and nonessential medications including IV fluids have been discontinued.    I have asked for palliative care consultation.  This would be useful in the event that the patient survives through more than a day or two.        Interval History (Subjective):      Chart reviewed, pt  interviewed.      Initially this morning the patient was unresponsive on BiPAP.  He had not been given any sedative medications at that time.    Note that the laboratory service has called repeatedly now with reports of gram-negative rods in the blood subsequently identified as Escherichia coli.                  Physical Exam:      Last Vital Signs:  /51  Pulse 124  Temp 99.5  F (37.5  C)  Resp 20  Ht 1.829 m (6')  Wt 80.7 kg (177 lb 14.6 oz)  SpO2 98%  BMI 24.13 kg/m2    I/O last 3 completed shifts:  In: 6597.25 [I.V.:2597.25; IV Piggyback:4000]  Out: 160 [Urine:160]    Constitutional:  Unresponsive.  Grimace is evident.  Patient withdraws to noxious stimulus.   Respiratory: Clear to auscultation bilaterally, no crackles or wheezing   Cardiovascular: Regular rate and rhythm, normal S1 and S2, and no murmur noted   Abdomen: Normal bowel sounds, soft, non-distended, non-tender   Skin: No rashes, no cyanosis, dry to touch   Neuro:  Unable.   Extremities: No edema.  Perfusion is intact.   Other(s):               Medications:      All current medications were reviewed with changes reflected in problem list.         Data:      All new lab and imaging data was reviewed.   Labs/Imaging:  Results for orders placed or performed during the hospital encounter of 02/03/18 (from the past 24 hour(s))   CBC with platelets differential   Result Value Ref Range    WBC 8.5 4.0 - 11.0 10e9/L    RBC Count 4.56 4.4 - 5.9 10e12/L    Hemoglobin 13.5 13.3 - 17.7 g/dL    Hematocrit 43.6 40.0 - 53.0 %    MCV 96 78 - 100 fl    MCH 29.6 26.5 - 33.0 pg    MCHC 31.0 (L) 31.5 - 36.5 g/dL    RDW 13.5 10.0 - 15.0 %    Platelet Count 322 150 - 450 10e9/L    Diff Method Manual Differential     % Neutrophils 82.0 %    % Lymphocytes 11.0 %    % Monocytes 2.0 %    % Eosinophils 0.0 %    % Basophils 1.0 %    % Metamyelocytes 3.0 %    % Myelocytes 1.0 %    Absolute Neutrophil 7.0 1.6 - 8.3 10e9/L    Absolute Lymphocytes 0.9 0.8 - 5.3 10e9/L     Absolute Monocytes 0.2 0.0 - 1.3 10e9/L    Absolute Eosinophils 0.0 0.0 - 0.7 10e9/L    Absolute Basophils 0.1 0.0 - 0.2 10e9/L    Absolute Metamyelocytes 0.3 (H) 0 10e9/L    Absolute Myelocytes 0.1 (H) 0 10e9/L    RBC Morphology Consistent with reported results     Platelet Estimate       Automated count confirmed.  Platelet morphology is normal.   Basic metabolic panel (BMP)   Result Value Ref Range    Sodium 139 133 - 144 mmol/L    Potassium 4.3 3.4 - 5.3 mmol/L    Chloride 108 94 - 109 mmol/L    Carbon Dioxide 15 (L) 20 - 32 mmol/L    Anion Gap 16 (H) 3 - 14 mmol/L    Glucose 254 (H) 70 - 99 mg/dL    Urea Nitrogen 48 (H) 7 - 30 mg/dL    Creatinine 1.70 (H) 0.66 - 1.25 mg/dL    GFR Estimate 38 (L) >60 mL/min/1.7m2    GFR Estimate If Black 46 (L) >60 mL/min/1.7m2    Calcium 9.2 8.5 - 10.1 mg/dL   Blood culture ONE site   Result Value Ref Range    Specimen Description Blood Right Arm     Special Requests Aerobic and anaerobic bottles received     Culture Micro (A)      Cultured on the 1st day of incubation:  Gram negative rods      Culture Micro       Critical Value/Significant Value, preliminary result only, called to and read back by  Jessie Block RN [facility] @ 1005.cg 02/04/18     Partial thromboplastin time   Result Value Ref Range    PTT 34 22 - 37 sec   Hepatic panel   Result Value Ref Range    Bilirubin Direct 0.3 (H) 0.0 - 0.2 mg/dL    Bilirubin Total 0.8 0.2 - 1.3 mg/dL    Albumin 2.5 (L) 3.4 - 5.0 g/dL    Protein Total 7.6 6.8 - 8.8 g/dL    Alkaline Phosphatase 340 (H) 40 - 150 U/L    ALT 11 0 - 70 U/L    AST 10 0 - 45 U/L   INR   Result Value Ref Range    INR 1.19 (H) 0.86 - 1.14   Procalcitonin   Result Value Ref Range    Procalcitonin 26.65 (HH) ng/ml   EKG 12 lead   Result Value Ref Range    Interpretation ECG Click View Image link to view waveform and result    ISTAT Basic Met ICa HCT POCT   Result Value Ref Range    Sodium 140 133 - 144 mmol/L    Potassium 4.3 3.4 - 5.3 mmol/L    Chloride 110  (H) 94 - 109 mmol/L    Total CO2 16 (L) 20 - 32 mmol/L    Anion Gap 14 6 - 17 mmol/L    Glucose 253 (H) 70 - 99 mg/dL    Urea Nitrogen 41 (H) 7 - 30 mg/dL    Creatinine 2.0 (H) 0.66 - 1.25 mg/dL    GFR Estimate 32 (L) >60 mL/min/1.7m2    GFR Estimate If Black 39 (L) >60 mL/min/1.7m2    Calcium Ionized 4.1 (L) 4.4 - 5.2 mg/dL    Hemoglobin 14.3 13.3 - 17.7 g/dL    Hematocrit - POCT 42 40.0 - 53.0 %PCV   ISTAT gases lactate hector POCT   Result Value Ref Range    Ph Venous 7.43 7.32 - 7.43 pH    PCO2 Venous 23 (L) 40 - 50 mm Hg    PO2 Venous 40 25 - 47 mm Hg    Bicarbonate Venous 15 (L) 21 - 28 mmol/L    O2 Sat Venous 78 %    Lactic Acid 8.1 (HH) 0.7 - 2.1 mmol/L   Glucose by meter   Result Value Ref Range    Glucose 265 (H) 70 - 99 mg/dL   Blood culture ONE site   Result Value Ref Range    Specimen Description Blood Left Hand     Special Requests Aerobic and anaerobic bottles received     Culture Micro (A)      Cultured on the 1st day of incubation:  Gram negative rods      Culture Micro       Critical Value/Significant Value, preliminary result only, called to and read back by  Jessie LAWSON @ 0928. 02/04/18      Culture Micro       (Note)  POSITIVE for E.COLI by Verigene multiplex nucleic acid test. Final  identification and antimicrobial susceptibility testing will be  verified by standard methods. Verigene test will not distinguish  E.coli from Shigella species including S.dysenteriae, S.flexneri,  S.boydii, and S.sonnei. Specimens containing Shigella species or  E.coli will be reported as Positive for E.coli.    Specimen tested with Verigene multiplex, gram-negative blood culture  nucleic acid test for the following targets: Acinetobacter sp.,  Citrobacter sp., Enterobacter sp., Proteus sp., E. coli, K.  pneumoniae/oxytoca, P. aeruginosa, and the following resistance  markers: CTXM, KPC, NDM, VIM, IMP and OXA.    Critical Value/Significant Value called to and read back by Jessie LAWSON @  1156.cg 02/04/18     Chest  XR, 1 view PORTABLE    Narrative    CHEST PORTABLE ONE VIEW    2/3/2018 6:28 PM     HISTORY: Septic/respiratory distress.      COMPARISON: Chest x-ray 12/29/2017.      Impression    IMPRESSION: New patchy opacities medial left lung base that could be  atelectasis or airspace disease. Right lung is clear. Normal cardiac  silhouette.     DOV LINK MD   Clostridium difficile toxin B PCR   Result Value Ref Range    Specimen Description Feces     C Diff Toxin B PCR Negative NEG^Negative   UA with Microscopic reflex to Culture   Result Value Ref Range    Color Urine Shante     Appearance Urine Cloudy     Glucose Urine Negative NEG^Negative mg/dL    Bilirubin Urine Negative NEG^Negative    Ketones Urine Negative NEG^Negative mg/dL    Specific Gravity Urine 1.026 1.003 - 1.035    Blood Urine Large (A) NEG^Negative    pH Urine 5.0 5.0 - 7.0 pH    Protein Albumin Urine 100 (A) NEG^Negative mg/dL    Urobilinogen mg/dL 2.0 0.0 - 2.0 mg/dL    Nitrite Urine Negative NEG^Negative    Leukocyte Esterase Urine Large (A) NEG^Negative    Source Catheterized Urine     WBC Urine >182 (H) 0 - 2 /HPF    RBC Urine >182 (H) 0 - 2 /HPF    WBC Clumps Present (A) NEG^Negative /HPF    Bacteria Urine Many (A) NEG^Negative /HPF    Squamous Epithelial /HPF Urine 1 0 - 1 /HPF    Mucous Urine Present (A) NEG^Negative /LPF    sperm Present (A) NEG^Negative /HPF   Urine Culture Aerobic Bacterial   Result Value Ref Range    Specimen Description Catheterized Urine     Special Requests Specimen received in preservative     Culture Micro Culture in progress    Lactic acid whole blood   Result Value Ref Range    Lactic Acid 9.7 (HH) 0.7 - 2.0 mmol/L   Lactic acid whole blood   Result Value Ref Range    Lactic Acid 8.2 (HH) 0.7 - 2.0 mmol/L   Methicillin Resist/Sens S. aureus PCR   Result Value Ref Range    Specimen Description Nares     Methicillin Resist/Sens S. aureus PCR Negative NEG^Negative   Methicillin resistant staph  aureus cult   Result Value Ref Range    Specimen Description Nares     Culture Micro       Canceled, Test credited  Incorrectly ordered by PCU/Clinic      Culture Micro Charge credited    Glucose by meter   Result Value Ref Range    Glucose 258 (H) 70 - 99 mg/dL   XR Chest Port 1 View    Narrative    XR CHEST PORT 1 VIEW   2/4/2018 12:30 AM     HISTORY: RN placed PICC - verify tip placement.     COMPARISON: 2/3/2018.    FINDINGS: Upright portable chest. A right PICC has been placed and the  tip is in the distal SVC in good position. No pneumothorax. The heart  size is normal. There is left basilar infiltrate. The lungs are  otherwise clear.      Impression    IMPRESSION: Right PICC to distal SVC.    TOMY HERNANDEZ MD   Lactic acid   Result Value Ref Range    Lactic Acid 4.6 (HH) 0.4 - 2.0 mmol/L   Basic metabolic panel   Result Value Ref Range    Sodium 141 133 - 144 mmol/L    Potassium 3.9 3.4 - 5.3 mmol/L    Chloride 110 (H) 94 - 109 mmol/L    Carbon Dioxide 20 20 - 32 mmol/L    Anion Gap 11 3 - 14 mmol/L    Glucose 253 (H) 70 - 99 mg/dL    Urea Nitrogen 47 (H) 7 - 30 mg/dL    Creatinine 1.97 (H) 0.66 - 1.25 mg/dL    GFR Estimate 32 (L) >60 mL/min/1.7m2    GFR Estimate If Black 39 (L) >60 mL/min/1.7m2    Calcium 7.9 (L) 8.5 - 10.1 mg/dL   Glucose by meter   Result Value Ref Range    Glucose 222 (H) 70 - 99 mg/dL   CBC with platelets   Result Value Ref Range    WBC 35.7 (H) 4.0 - 11.0 10e9/L    RBC Count 3.30 (L) 4.4 - 5.9 10e12/L    Hemoglobin 10.1 (L) 13.3 - 17.7 g/dL    Hematocrit 31.9 (L) 40.0 - 53.0 %    MCV 97 78 - 100 fl    MCH 30.6 26.5 - 33.0 pg    MCHC 31.7 31.5 - 36.5 g/dL    RDW 13.9 10.0 - 15.0 %    Platelet Count 251 150 - 450 10e9/L   Magnesium   Result Value Ref Range    Magnesium 1.4 (L) 1.6 - 2.3 mg/dL   Phosphorus   Result Value Ref Range    Phosphorus 3.6 2.5 - 4.5 mg/dL   Procalcitonin   Result Value Ref Range    Procalcitonin >200.00 (HH) ng/ml   Lactic acid   Result Value Ref Range    Lactic  Acid 3.9 (H) 0.4 - 2.0 mmol/L   Basic metabolic panel   Result Value Ref Range    Sodium 141 133 - 144 mmol/L    Potassium 3.8 3.4 - 5.3 mmol/L    Chloride 110 (H) 94 - 109 mmol/L    Carbon Dioxide 22 20 - 32 mmol/L    Anion Gap 9 3 - 14 mmol/L    Glucose 220 (H) 70 - 99 mg/dL    Urea Nitrogen 48 (H) 7 - 30 mg/dL    Creatinine 2.03 (H) 0.66 - 1.25 mg/dL    GFR Estimate 31 (L) >60 mL/min/1.7m2    GFR Estimate If Black 38 (L) >60 mL/min/1.7m2    Calcium 7.9 (L) 8.5 - 10.1 mg/dL   D dimer quantitative   Result Value Ref Range    D Dimer 4.2 (H) 0.0 - 0.50 ug/ml FEU   Partial thromboplastin time   Result Value Ref Range    PTT 45 (H) 22 - 37 sec   Glucose by meter   Result Value Ref Range    Glucose 195 (H) 70 - 99 mg/dL   Glucose by meter   Result Value Ref Range    Glucose 196 (H) 70 - 99 mg/dL

## 2018-02-04 NOTE — PROGRESS NOTES
[facility] Progress Note   ICU   Patient is DNR/DNI and is going to  Be taken off oxygen that he is presently on.  Staff waiting for all family to be present.  Ichecked in with them periodically today and offered a prayer of comfort, and told the family that I will be available if they would like me to come back later today. Visit was appreciated.        Kelley Soliz  Chaplain Resident  [phone]

## 2018-02-04 NOTE — ED NOTES
Pt presents via EMS for evaluation of increased weakness, hypoxia, AMS, fevers and tachypnea. Pt had a temp of 100.2 at NH facility earlier today, was monitored and then found to have a temp of 104 per EMS. Pt was given tylenol PTA. Pt 82% on RA, placed on 15 LPM @ 1806, switched to BiPAP @ 1815 with 70% FiO2. Blood cultures x 2 with basic labs. Portable chest Xray completed in room along with EKG.

## 2018-02-05 NOTE — PLAN OF CARE
Problem: Patient Care Overview  Goal: Plan of Care/Patient Progress Review  Outcome: Therapy, progress toward functional goals as expected  Orientation: Somnolent. Minimally responsive overnight. Opened eyes once with repositioning only.   VSS. 94% on RA. Resp 20-24. HR 80s.  LS: diminished throughout. No accessory muscle use.   GI: Passing gas. 1 small loose BM.  : decreased urine output. Boyle in place. Positional.   Skin: bruising noted throughout. Intact. Warm to touch.   Activity: Total assist/lift. Repositioned for comfort overnight. Pt slept comfortably throughout shift.   Pain: 0/10 flacc. Pt appears comfortable. No nonverbal signs of pain noted.   Plan: Continue with current cares. Comfort cares.     0630: Pt noted to be having more purposeful movements. Raising arms when asked by nurse x1. Nonverbal. Not opening eyes.

## 2018-02-05 NOTE — CONSULTS
[facility]    Palliative Care Consultation   Text Page    Date of Admission:  2/3/2018    Assessment & Plan   Mariano Clemente is a 86 year old male who was admitted on 2/3/2018. I was asked to see the patient for symptom management and support.    Recommendations:  1. Dyspnea - rubinol 0.2-0.4mg IV q 4 hours. Atropine 1-2 gtts q 1 hour prn secretions. Hydromorphone 1-2mg SL or 0.3-0.5 mg IV q 2 hours prn dyspnea or respirations greater than 20 per minute or pain. Lorazepam 0.5-1mg SL or 0.5 mg IV q 4 hours prn anxiety or agitation.  Fan at bedside. Position for comfort.    2. Pain - Tylenol 650mg NH q 6 hours. Hydromorphone 1-2 mg SL or 0.3-0.5 mg IV q 2 hours prn dyspnea or respirations greater than 20 per minute or pain. Position for comfort. Appreciate to offer essential oils, TV for old movies.    Goal of Care: DNR DNI, Comfort care. Pt does not demonstrate decision-making capacity. Wife Porsha is next of kin. No HCD. Jhon and Brant are POA's but not for health care. Wife hopes pt will die here. If he survives she would like him to go back to [facility] with Hospice support.    Disease Process/es & Symptoms:  Mariano Clemente is a 86 year old patient admitted with symptoms of altered mental status and fever. He has been treated for sepsis, acute hypoxic respiratory failure with L PNA, history of myesthenia gravis on chronic prednisone, acute renal failure, lactic acidosis, DM2..      This is in the setting of myasthenia gravis, increasing needs for assistance with ADLS, incontinence, hx of CVA, HTN, hyperlipidemia.  He has been hospitalized 2 times in the past 2 months for recurrent altered mental status and acute hypoxic respiratory failure due to infection. Patient has moved to inpatient care from LTC for higher level of care and needs help with at least one ADL. There is a documented unitentional weight loss of 91 pounds over the past 6 months.      Psychosocial/Spiritual Needs:  Pt was raised  "Orthodox. He has been attending [employer] with his wife.  Oriented to Spiritual Health and Social Work Services as part of Palliative Care team.  is following. Consultation placed for  to follow.    Decision-Making & Goals of Care:  Discussion/counseling today about goals of care/decisions:   2/5/18 called wife who is home ill today. Introduced myself and explained palliative care.  Porsha tells me she spoke for a long time with the doctor yesterday and understands how sick her  is.  \"I don't want to pump him full of fluids and do a whole bunch of things to him, only for him to live for a month\" \"If he doesn't know where he is . . .want him to be kept comfortable\". She explains that she and Brant have been  for 45 years. He had 3 kids and she has 4. She tells me that his kids have been angry with her and blame her for putting him in the NH at [facility] last year. \"I can't put him in a NH, the doctor has to and did\". \"They won't talk to me\". \"they are all in [place] and live there, except for Brant who will come back to [place] after the winter\". Her kids are involved and supportive.  She hopes to keep him comfortable. If he survives, she would want him to return to [facility] with hospice care. She told [facility] she wanted them to hold his semiprivate room there \"and the nurses love him\". She notes that pt loves to read, but has noticed that he has had the same book open to the same place for several weeks, and the nursing staff told her he has not been eating for a few days prior to pt.s admission. She was surprised when I told her his weight is down approximately 90# from last summer when he saw his primary care provider.     Patient has decision-making capacity Unreliable  Patient has no known legal document designating a decision maker. Per policy next of kin is the designated decision maker. See System Informed Consent policy for guidance.  Wife Porsha says there is " a POA, but not a HCD or HCPOA.  Name: Porsha Clemente, Relationship: spouse    See ACP tab in pt's medical record for contact information.    Patient has a completed health care directive available in the chart (Y/N): N  Physician orders for life-sustaining treatment (POLST) form is on file but needs to be updated if pt discharges.  Code Status: Do not resuscitate / Do not intubate     Findings & plan of care discussed with: Dr. Rdz, Bedside nurse, Charge Nurse, SWS/CC.  Follow-up plan from palliative team: Will continue to support this pt for symptom management and pt and family for goals of care.  Thank you for involving us in the patient's care.     Maria Elena VIEYRA, CNS  Pain Management and Palliative Care  [facility]  Pgr: [phone]    Time Spent on this Encounter   I spent 25 minutes in assessment of the patient. Another 45 minutes in review of chart, documentation and discussion with the health care team.    Phone call to wife Porsha for consult and support 11:30-12:15PM.    Reason for Consult   Reason for consult: I was asked by Dr. Joy to evaluate this patient for Symptom management  Goals of care  Decisional support.    Primary Care Physician   [facility]    Chief Complaint   Altered mental status and fever.    History is obtained from the electronic health record    History of Present Illness   Mariano Clemente is a 86 year old male who presents with low oxygen and fever from his nursing care facility. History of myasthenia gravis. Recent influenza A and respiratory failure 1 month ago. In ED he was found to be hypoxic with chest xray with L lung infiltrate concerning for PNA. He also had a lactic acid of 8.1 so was started on the sepsis protocol. He was placed on Bipap and transferred to ICU.     When I see pt, he has been made comfort care and taken off bipap in ICU. He stabilized and was transferred to 5th floor under comfort care. No family is present when I  see him.    Past Medical History   I have reviewed this patient's medical history and updated it with pertinent information if needed.   Past Medical History:   Diagnosis Date     Diabetes (H)      Myasthenia gravis (H)    CVA  HTN    Past Surgical History   I have reviewed this patient's surgical history and updated it with pertinent information if needed.  History reviewed. No pertinent surgical history.   Jaw surgery, Knee surgery, Laproscopic hernia repair    Prior to Admission Medications   Prior to Admission Medications   Prescriptions Last Dose Informant Patient Reported? Taking?   Acetaminophen (TYLENOL PO) 2/3/2018 at 0800  Yes Yes   Sig: Take 1,000 mg by mouth every 8 hours   Acetaminophen (TYLENOL PO) 2/3/2018 at Unknown time  Yes Yes   Sig: Take 1,000 mg by mouth daily as needed for mild pain or fever   Clopidogrel Bisulfate (PLAVIX PO) 2/3/2018 at 0800 Daughter Yes Yes   Sig: Take 75 mg by mouth daily    GABAPENTIN PO 2/3/2018 at 0800 Daughter Yes Yes   Sig: Take 600 mg by mouth daily    GABAPENTIN PO 2/2/2018 at 2000 Daughter Yes Yes   Sig: Take 300 mg by mouth At Bedtime   PREDNISONE PO 2/3/2018 at 0800 Daughter Yes Yes   Sig: Take 5 mg by mouth daily    albuterol (2.5 MG/3ML) 0.083% neb solution   Yes Yes   Sig: Take 2.5 mg by nebulization every 4 hours as needed for shortness of breath / dyspnea or wheezing   albuterol (PROAIR HFA/PROVENTIL HFA/VENTOLIN HFA) 108 (90 BASE) MCG/ACT Inhaler   Yes Yes   Sig: Inhale 2 puffs into the lungs every 4 hours as needed for shortness of breath / dyspnea or wheezing   metFORMIN (GLUCOPHAGE) 500 MG tablet 2/3/2018 at 1700  No Yes   Sig: Take 1 tablet (500 mg) by mouth 2 times daily (with meals)   pyridostigmine (MESTINON) 180 MG CR tablet 2/3/2018 at 0800 Daughter Yes Yes   Sig: Take 180 mg by mouth 2 times daily       Facility-Administered Medications: None     Allergies   Allergies   Allergen Reactions     Asa [Aspirin]      Cephalosporins      Diphenhydramine       Metformin      Penicillins      Strawberry      Pravastatin Itching and Rash       Social History   I have updated and reviewed the following Social History Narrative:   Social History     Social History Narrative      Living situation: Has lived at [facility] since last summer due to increased care needs.  Family system: Wife Porsha. Pt has 3 adult sons and she has 4 adult children.  Functional status (needs help with ADLs or IADLs): Pt has been living at [facility] in semiprivate room since last summer.  Employment/education: / for Insurance.  Use of community resources: AA meetings.  Activities/interests: Loves to read, especially westerns lately.  History of substance use/abuse: Former smoker. Attends AA meetings.  [employer] affiliation: See above.  Involvement in dominic community: none.  Impact of illness on patient: Pt is gravely ill and not expected to survive. He has lost significant weight since last summer, and is requiring more help with ADLS.    Family History   I have reviewed this patient's family history and updated it with pertinent information if needed.   No family history on file.    Review of Systems   Review of systems not obtained due to patient factors - mental status    Palliative Symptom Review (0=no symptom/no concern, 1=mild, 2=moderate, 3=severe):    Unable to obtain palliative symptom review due to pt mental status    Physical Exam   Temp:  [100.6  F (38.1  C)-103.1  F (39.5  C)] 102  F (38.9  C)  Heart Rate:  [] 82  Resp:  [17-30] 24  BP: ()/() 97/60  FiO2 (%):  [35 %] 35 %  SpO2:  [81 %-97 %] 97 %  177 lbs 14.58 oz  GEN:  Drowsy, opens eyes to voice, SHOBHA orientation, appears comfortable, NAD.  HEENT:  Normocephalic/atraumatic, no scleral icterus, no nasal discharge, mouth dry.  CV:  RRR 90 bpm, S1, S2; no murmurs or other irregularities noted.  +3 DP/PT pulses bilatererally; no edema BLE.  RESP:  RR 24/min, irregular. RH anteriorly to  auscultation bilaterally.  Symmetric chest rise on inhalation noted.  Sl labored respiratory effort. Loose, weak cough.  ABD:  Rounded, soft, non-tender/non-distended.  +BS  EXT:  Edema & pulses as noted above.  CMS intact x 4.     M/S:   Non-Tender to palpation    SKIN:  Dry to touch, bruising on arms.    NEURO: Symmetric strength +5/5. Squeezes hands and moves toes to command. Sensation to touch intact all extremities.   There is no area of allodynia or hyperesthesia.  Psych:  Somulent.  Calm, cooperative, non-verbal, tries to whisper.     Delirium Screen/CAM:  SHOBHA.    Data   No results found for this or any previous visit (from the past 24 hour(s)).  Order    XR Chest Port 1 View [[id number]] (Order [id number])         Exam Information      Exam Date Exam Time Accession # Performing Department Results      2/4/18 12:30 AM [id number] [facility] Radiology        [employer] Interactive Report and InfoRx      View the interactive report       PACS Images      Show images for XR Chest Port 1 View       Study Result      XR CHEST PORT 1 VIEW   2/4/2018 12:30 AM      HISTORY: RN placed PICC - verify tip placement.      COMPARISON: 2/3/2018.     FINDINGS: Upright portable chest. A right PICC has been placed and the  tip is in the distal SVC in good position. No pneumothorax. The heart  size is normal. There is left basilar infiltrate. The lungs are  otherwise clear.         IMPRESSION: Right PICC to distal SVC.     TOMY HERNANDEZ MD

## 2018-02-05 NOTE — PLAN OF CARE
Problem: Dying Patient, Actively (Adult)  Goal: Identify Related Risk Factors and Signs and Symptoms  Related risk factors and signs and symptoms are identified upon initiation of Human Response Clinical Practice Guideline (CPG).   Outcome: No Change  Pt on comfort cares orders. Turning repositioning as needed, incontinent of stool this shift. Boyle in place. Prn dilaudid given x1 for elevated RR. Sublinguial ativan given x1 for restlessness when family present at bedside. PICC in place. NPO; mouth cares frequently. Possible discharge back to [place] with hospice cares.

## 2018-02-05 NOTE — CONSULTS
"D:  SW responding to MD consult.  I/A: Spoke with pt's wife Yu via phone.  She is interested in hospice services for \"Brant\" and would like the meeting to take place at [facility] when pt is discharged.  Hospice will contact Yu to schedule a meeting.  P: SW continues to be available.    "

## 2018-02-05 NOTE — PROGRESS NOTES
[facility]  Hospitalist Progress Note  Patient Name: Mariano Clemente    MRN: [id number]  Provider: Santy Rdz MD  02/05/18    Initial presenting complaint/issue to hospital (Diagnosis): fever, change in mental status, and hypoxia         Assessment and Plan:      Summary of Stay: Mariano Clemente is an 86 year old male with history of myasthenia gravis and type 2 diabetes.  He presented to the ED on 2/3/2018 with fever, increased tremor, hypoxia and change in mental status.  He was found to have fever with temp of 101.8, tachycardia with HR of 124, hypoxia with SaO2 of 70%, renal failure with BUN of 41 and creatinine of 2, and lactic acid of 8.1.  Left sided pneumonia was noted on chest x-ray.  Procalcitonin was checked and later came back elevated at 26.65.  He was admitted to the hospital, treated with meropenem for possible gram-negative pneumonia sepsis, hydrated, and given supplemental oxygen.  In accordance with his previously stated wishes he was made DNR/DNI.  After admission he declined.  Lactic acidosis did not improve.  To admission, white blood cell count rosana from 8.5-35.7.  Renal function did not improve.  Pro-calcitonin rosana to greater than 200.  Mental status did not improve.  My colleague met with patient's wife and spoke with several family members.  Based on several factors, including Mariano's quality of life, critical illness, and his previously expressed wishes for limited care the decision was made to pursue comfort care but not treat underlying pneumonia and sepsis.  Antibiotics were stopped.  Nonessential medications were stopped.  Positive care was consulted.     Problem List:   1. Severe sepsis with acute hypoxic respiratory failure due to possible gram-negative pneumonia.   2. Acute renal failure due, in part, to sepsis.  3. Severe lactic acidosis.  4. Myasthenia gravis.    5. Type 2 diabetes.    6. Comfort care.       Code Status: DNR / DNI  Disposition: Depends on clinical course.   He is currently comfort care.  Positive care is following.  Possible discharge to hospice Mariano does not pass relatively quickly.        Interval History:      Mariano is not able to divide any history this morning.                  Physical Exam:      Last Vital Signs:  BP 97/60  Pulse 124  Temp 102  F (38.9  C)  Resp 25  Ht 1.829 m (6')  Wt 80.7 kg (177 lb 14.6 oz)  SpO2 97%  BMI 24.13 kg/m2    Intake/Output Summary (Last 24 hours) at 02/05/18 1453  Last data filed at 02/05/18 1321   Gross per 24 hour   Intake            200.2 ml   Output              650 ml   Net           -449.8 ml       GENERAL:  Comfortable appearing.  Nonverbal.  Minimally responsive.  PSYCH:  No acute distress.  EYES: PERRLA, Normal conjunctiva.  HEART:  Regular rate and rhythm. No JVD. Pulses normal. No edema.  LUNGS: Coarse breath sounds bilaterally to auscultation, likely labored respiratory effort.  ABDOMEN:  Soft, no hepatosplenomegaly, normal bowel sounds.  EXTREMETIES: No clubbing, cyanosis or ischemia  SKIN:  Dry to touch, No rash.           Medications:      All current medications were reviewed.         Data:      All new lab and imaging data was reviewed.   Labs:    Recent Labs  Lab 02/04/18  0005 02/03/18  1920 02/03/18  1815 02/03/18  1810   CULT Canceled, Test creditedIncorrectly ordered by PCU/Clinic  Charge credited >100,000 colonies/mLEscherichia coliSusceptibility testing in progress*  >100,000 colonies/mLStrain 2Escherichia coliSusceptibility testing in progress* Cultured on the 1st day of incubation:Escherichia coli*  Critical Value/Significant Value, preliminary result only, called to and read back byJessie LAWSON @ 0928.cg 02/04/18  (Note)POSITIVE for E.COLI by [employer] multiplex nucleic acid test. Finalidentification and antimicrobial susceptibility testing will beverified by standard methods. Verigene test will not distinguishE.coli from Shigella species including S.dysenteriae,  S.flexneri,S.boydii, and S.sonnei. Specimens containing Shigella species orE.coli will be reported as Positive for E.coli.Specimen tested with Ver[employer] multiplex, gram-negative blood culturenucleic acid test for the following targets: Acinetobacter sp.,Citrobacter sp., Enterobacter sp., Proteus sp., E. coli, K.pneumoniae/oxytoca, P. aeruginosa, and the following resistancemarkers: CTXM, KPC, NDM, VIM, IMP and OXA.Critical Value/Significant Value called to and read back by Gaby LAWSON @ 1156.cg 02/04/18 Cultured on the 1st day of incubation:Escherichia coli*  Critical Value/Significant Value, preliminary result only, called to and read back byJessie LAWSON @ 1005. 02/04/18  Susceptibility testing in progress          Lab Results   Component Value Date     02/04/2018     02/04/2018     02/03/2018    Lab Results   Component Value Date    CHLORIDE 110 02/04/2018    CHLORIDE 110 02/04/2018    CHLORIDE 110 02/03/2018    Lab Results   Component Value Date    BUN 48 02/04/2018    BUN 47 02/04/2018    BUN 41 02/03/2018      Lab Results   Component Value Date    POTASSIUM 3.8 02/04/2018    POTASSIUM 3.9 02/04/2018    POTASSIUM 4.3 02/03/2018    Lab Results   Component Value Date    CO2 22 02/04/2018    CO2 20 02/04/2018    CO2 15 02/03/2018    Lab Results   Component Value Date    CR 2.03 02/04/2018    CR 1.97 02/04/2018    CR 1.70 02/03/2018          Recent Labs  Lab 02/04/18  0500 02/03/18  1812 02/03/18  1810   WBC 35.7*  --  8.5   HGB 10.1* 14.3 13.5   HCT 31.9*  --  43.6   MCV 97  --  96     --  322

## 2018-02-05 NOTE — PLAN OF CARE
Patient was transferred to the floor around 2215. Patient appears comfortable, positioned on left side with the support of pillows. PICC line is infusing at TKO. Telles catheter is patent. Oral and telles cares provided. Patient is unresponsive, extremities are pale but warm to touch.

## 2018-02-06 NOTE — PLAN OF CARE
Problem: Dying Patient, Actively (Adult)  Goal: Identify Related Risk Factors and Signs and Symptoms  Related risk factors and signs and symptoms are identified upon initiation of Human Response Clinical Practice Guideline (CPG).   Outcome: Adequate for Discharge Date Met: 02/06/18  Pt to D/C to [facility] . Pt unresponsive, wife provided with d/c instructions, including new medications, when medications were last given, and when pt will be receiving them again Wife verbalized understanding of all d/c and f/u instructions.  All questions were answered at this time.  Copy of paperwork sent with transport..  Medication/Scripts sent with transport.  [email or web address]  to provide stretcher transport. All personal belongings sent with pt. Palliative also met with patient and wife before discharge. PICC pulled per MD order. Boyle in place.

## 2018-02-06 NOTE — PLAN OF CARE
Problem: Dying Patient, Actively (Adult)  Goal: Identify Related Risk Factors and Signs and Symptoms  Related risk factors and signs and symptoms are identified upon initiation of Human Response Clinical Practice Guideline (CPG).   Outcome: No Change  Pt opens eyes to voice but is not responsive and does not follow commands. Restless at times. Alternating SL dilaudid and ativan. Scheduled tylenol and robinol. Spot checked HR/Sats, maintaining 120s HR, Resp 28-32.94%/RA. Pt breathing is labored and shallow. Turned and mouth care Q2*.

## 2018-02-06 NOTE — PROGRESS NOTES
I talked with Paul wife about dc planning.  She was surprised he was ready to dc today.  She is agreeable to have him dc back to [facility] LTC with  Hospice.  Wife prefers to meet hospice at [facility].  I called Erica from Hospice intake and she will connect with wife to sign him on.  The wife said she is very busy on wed, but Thursday may work to sign him on.  I encouraged her to do Wednesday and I also updated Erica from hospice.  Palliative will fill medications bubble wrap and no ranges.  [facility] stretcher set up for today at 1400. Wife aware of private cost.   I notified [facility] LT of pt's return for today with hospice support.  Vanessa RN CTS [id number]    Dc orders sent to [facility] lt. Wife updated on transport time.      Erica from hospice will meet at [address] [facility].

## 2018-02-06 NOTE — DISCHARGE SUMMARY
"[facility]    Discharge Summary  Hospitalist    Date of Admission:  2/3/2018  Date of Discharge:  2/6/2018  Discharging Provider: Sailaja Johnson MD    Discharge Diagnoses    1. Severe sepsis with acute hypoxic respiratory failure due to possible gram-negative pneumonia.   2. Acute renal failure due, in part, to sepsis.  3. Severe lactic acidosis.  4. Hx Myasthenia gravis.    5. Type 2 diabetes.    6. Comfort care.      Chief complaint: Altered Mental Status & Hypoxia    History of Present Illness   Mariano Clemente is an anticoagulated 86 year old male, DNI status, on Plavix who presents via EMS with altered mental status and hypoxia. Staff at his care facility contacted EMS due to patient having altered mental status and profound weakness. On EMS arrival patient had fever at 104, 77% oxygen saturation on room air, and heart rate 160. Lungs sounded like \"crud\" per EMS. Blood pressures remained around 150/80 en route to the hospital. 18 gauge IV established en route. Blood sugar 289. Per staff patient is normally quiet but alert.        Hospital Course   Mariano Clemente was admitted on 2/3/2018.  The following problems were addressed during his hospitalization:    Active Problems:    Septic shock (H)    Summary of Stay: Mariano Clemente is an 86 year old male with history of myasthenia gravis and type 2 diabetes.  He presented to the ED on 2/3/2018 with fever, increased tremor, hypoxia and change in mental status.  He was found to have fever with temp of 101.8, tachycardia with HR of 124, hypoxia with SaO2 of 70%, renal failure with BUN of 41 and creatinine of 2, and lactic acid of 8.1.  Left sided pneumonia was noted on chest x-ray.  Procalcitonin was checked and later came back elevated at 26.65.  He was admitted to the hospital, treated with meropenem for possible gram-negative pneumonia sepsis, hydrated, and given supplemental oxygen.  In accordance with his previously stated wishes he was made " DNR/DNI.  After admission he declined.  Lactic acidosis did not improve.  To admission, white blood cell count rosana from 8.5-35.7.  Renal function did not improve.  Pro-calcitonin rosana to greater than 200.  Mental status did not improve.  My colleague met with patient's wife and spoke with several family members.  Based on several factors, including Mariano's quality of life, critical illness, and his previously expressed wishes for limited care the decision was made to pursue comfort care but not treat underlying pneumonia and sepsis.  Antibiotics were stopped.  Nonessential medications were stopped.    - Palliative care was consulted.  - Hospice to consult after d/c to continue comfort care measures.  - PTA prednisone & Mestinon were discontinued at discharge  -informed pt's wife of d/c    Sailaja Johnson MD.    Significant Results and Procedures   No operations/procedures    Pending Results   Pt on comfort care measures.   Unresulted Labs Ordered in the Past 30 Days of this Admission     Date and Time Order Name Status Description    2/3/2018 1920 Urine Culture Aerobic Bacterial Preliminary           Code Status   Comfort Care       Primary Care Physician   [facility]    Physical Exam         Pulse: 117 Heart Rate: 126 Resp: 26 SpO2: 97 % O2 Device: None (Room air)    Vitals:    02/03/18 1915 02/03/18 2315   Weight: 81.1 kg (178 lb 12.7 oz) 80.7 kg (177 lb 14.6 oz)     Vital Signs with Ranges  Pulse:  [106-131] 117  Heart Rate:  [125-126] 126  Resp:  [20-32] 26  SpO2:  [93 %-97 %] 97 %  I/O last 3 completed shifts:  In: 250.2 [I.V.:250.2]  Out: 1200 [Urine:1200]    Examination:  Well-built well-nourished. In NAD  Heart: Regular rhythm. S1S2, no murmurs, rubs,gallops  Lungs: Clear to auscultation. And no rhonchi rales or wheezing heard.  Abdomen: Soft and nontender. Bowel sounds present.  Extremities: No swelling.  Neuro:A,A,Ox3, motor sensory grossly intact    Discharge Disposition    Discharged to long-term care facility  Condition at discharge: Terminal    Consultations This Hospital Stay   PHARMACY TO DOSE VANCO  VASCULAR ACCESS ADULT IP CONSULT  PALLIATIVE CARE ADULT IP CONSULT  SPIRITUAL HEALTH SERVICES IP CONSULT  PALLIATIVE CARE ADULT IP CONSULT  SOCIAL WORK IP CONSULT    Time Spent on this Encounter   ISailaja MD, personally saw the patient today and spent less than or equal to 30 minutes discharging this patient.    Discharge Orders     General info for SNF   Length of Stay Estimate: Short Term Care: Estimated # of Days <30  Condition at Discharge: Terminal  Level of care:board and care  Rehabilitation Potential: Poor  Admission H&P remains valid and up-to-date: Yes  Recent Chemotherapy: N/A  Use Nursing Home Standing Orders: Yes     Mantoux instructions   Give two-step Mantoux (PPD) Per Facility Policy Yes if needed.     Follow Up and recommended labs and tests   Hospice consult.     Activity - Up with nursing assistance     Special Code (specify)   Comfort care / Hospice to consult.     Fall precautions     Advance Diet as Tolerated   Follow this diet upon discharge: Has been NPO in hospital.       Discharge Medications   Current Discharge Medication List      CONTINUE these medications which have NOT CHANGED    Details   albuterol (2.5 MG/3ML) 0.083% neb solution Take 2.5 mg by nebulization every 4 hours as needed for shortness of breath / dyspnea or wheezing         STOP taking these medications       metFORMIN (GLUCOPHAGE) 500 MG tablet Comments:   Reason for Stopping:         albuterol (PROAIR HFA/PROVENTIL HFA/VENTOLIN HFA) 108 (90 BASE) MCG/ACT Inhaler Comments:   Reason for Stopping:         Acetaminophen (TYLENOL PO) Comments:   Reason for Stopping:         Acetaminophen (TYLENOL PO) Comments:   Reason for Stopping:         Clopidogrel Bisulfate (PLAVIX PO) Comments:   Reason for Stopping:         pyridostigmine (MESTINON) 180 MG CR tablet Comments:    Reason for Stopping:         GABAPENTIN PO Comments:   Reason for Stopping:         PREDNISONE PO Comments:   Reason for Stopping:         GABAPENTIN PO Comments:   Reason for Stopping:             Allergies   Allergies   Allergen Reactions     Asa [Aspirin]      Cephalosporins      Diphenhydramine      Metformin      Penicillins      Strawberry      Pravastatin Itching and Rash     Data   Most Recent 3 CBC's:  Recent Labs   Lab Test  02/04/18   0500  02/03/18   1812  02/03/18   1810  01/01/18   0724   WBC  35.7*   --   8.5  9.1   HGB  10.1*  14.3  13.5  11.8*   MCV  97   --   96  96   PLT  251   --   322  317      Most Recent 3 BMP's:  Recent Labs   Lab Test  02/04/18   0500  02/04/18   0200  02/03/18   1812 02/03/18   1810   NA  141  141  140  139   POTASSIUM  3.8  3.9  4.3  4.3   CHLORIDE  110*  110*  110*  108   CO2  22  20   --   15*   BUN  48*  47*  41*  48*   CR  2.03*  1.97*   --   1.70*   ANIONGAP  9  11  14  16*   DALTON  7.9*  7.9*   --   9.2   GLC  220*  253*  253*  254*     Most Recent 2 LFT's:  Recent Labs   Lab Test  02/03/18   1810  12/29/17   0446   AST  10  14   ALT  11  14   ALKPHOS  340*  78   BILITOTAL  0.8  0.4     Most Recent INR's and Anticoagulation Dosing History:  Anticoagulation Dose History     Recent Dosing and Labs Latest Ref Rng & Units 12/29/2017 2/3/2018    INR 0.86 - 1.14 1.06 1.19(H)        Most Recent 3 Troponin's:  Recent Labs   Lab Test  12/29/17   0446  08/06/16   0546  08/06/16   0230   TROPI  0.037  0.033  0.028     Most Recent Cholesterol Panel:  Recent Labs   Lab Test  08/08/16   0713   CHOL  231*   LDL  148*   HDL  38*   TRIG  227*     Most Recent 6 Bacteria Isolates From Any Culture (See EPIC Reports for Culture Details):  Recent Labs   Lab Test  02/04/18   0005  02/03/18   1920  02/03/18   1815  02/03/18   1810  12/29/17   1030  12/29/17   0528   CULT  Canceled, Test credited  Incorrectly ordered by PCU/Clinic    Charge credited  >100,000 colonies/mL  Escherichia  coli  *  >100,000 colonies/mL  Strain 2  Escherichia coli  Piperacillin/Tazobactam susceptibilities in progress  2.6.2018  *  Cultured on the 1st day of incubation:  Escherichia coli  *  Critical Value/Significant Value, preliminary result only, called to and read back by  Jessie LAWSON @ 0928. 02/04/18    (Note)  POSITIVE for E.COLI by Verigene multiplex nucleic acid test. Final  identification and antimicrobial susceptibility testing will be  verified by standard methods. Verigene test will not distinguish  E.coli from Shigella species including S.dysenteriae, S.flexneri,  S.boydii, and S.sonnei. Specimens containing Shigella species or  E.coli will be reported as Positive for E.coli.    Specimen tested with Verigene multiplex, gram-negative blood culture  nucleic acid test for the following targets: Acinetobacter sp.,  Citrobacter sp., Enterobacter sp., Proteus sp., E. coli, K.  pneumoniae/oxytoca, P. aeruginosa, and the following resistance  markers: CTXM, KPC, NDM, VIM, IMP and OXA.    Critical Value/Significant Value called to and read back by Jessie Block RN REGLAU @ 1156. 02/04/18    Cultured on the 1st day of incubation:  Escherichia coli  Susceptibility testing done on previous specimen  *  Critical Value/Significant Value, preliminary result only, called to and read back by  Jessie Block RN ISAIAH @ 1005. 02/04/18    50,000 to 100,000 colonies/mL  Escherichia coli  *  No growth     Most Recent TSH, T4 and A1c Labs:  Recent Labs   Lab Test  12/29/17   0446   08/05/16   2016   TSH   --    --   2.67   A1C  8.4*   < >   --     < > = values in this interval not displayed.     Results for orders placed or performed during the hospital encounter of 02/03/18   Chest  XR, 1 view PORTABLE    Narrative    CHEST PORTABLE ONE VIEW    2/3/2018 6:28 PM     HISTORY: Septic/respiratory distress.      COMPARISON: Chest x-ray 12/29/2017.      Impression    IMPRESSION: New patchy opacities medial  left lung base that could be  atelectasis or airspace disease. Right lung is clear. Normal cardiac  silhouette.     DOV LINK MD   XR Chest Port 1 View    Narrative    XR CHEST PORT 1 VIEW   2/4/2018 12:30 AM     HISTORY: RN placed PICC - verify tip placement.     COMPARISON: 2/3/2018.    FINDINGS: Upright portable chest. A right PICC has been placed and the  tip is in the distal SVC in good position. No pneumothorax. The heart  size is normal. There is left basilar infiltrate. The lungs are  otherwise clear.      Impression    IMPRESSION: Right PICC to distal SVC.    TOMY HERNANDEZ MD

## 2018-02-06 NOTE — PLAN OF CARE
Problem: Dying Patient, Actively (Adult)  Goal: Comfort/Pain Control  Patient will demonstrate the desired outcomes by discharge/transition of care.   Outcome: No Change  Comfort cares. Dilaudid SL x2 given, increased RR and HR, some fidgeting. Resting comfortable after administration. Repositioned as needed. Boyle catheter patent. PICC TKO.

## 2018-02-06 NOTE — PROGRESS NOTES
[facility]  Palliative Care Progress Note  Text Page     Assessment & Plan   Mariano Clemente is a 86 year old male who was admitted on 2/3/2018. I was asked to see the patient for symptom management and support.     Recommendations:  1. Dyspnea - rubinol 0.2-0.4mg IV q 4 hours. Atropine 1-2 gtts q 1 hour prn secretions. Hydromorphone 2 mg SL q 4 hours and 2 hours prn dyspnea or respirations greater than 20 per minute or pain. Lorazepam 0.5-1 mg SL  q 4 hours.  Fan at bedside. Position for comfort.    2. Pain - Tylenol 650 mg MI q 6 hours. Hydromorphone 2 mg SL q 4 hours and q 2 hours prn dyspnea or respirations greater than 20 per minute or pain. Position for comfort. Appreciate to offer essential oils, TV for old movies.     Goal of Care: DNR DNI, Comfort care. Pt does not demonstrate decision-making capacity. Wife Porsha is next of kin. No HCD. Jhon and Brant are POA's but not for health care.  Pt will go back to [facility] with Hospice support between 2 and 3pm today.  Will complete POLST and order comfort meds today.     Disease Process/es & Symptoms:  Mariano Clemente is a 86 year old patient admitted with symptoms of altered mental status and fever. He has been treated for sepsis, acute hypoxic respiratory failure with L PNA, history of myesthenia gravis on chronic prednisone, acute renal failure, lactic acidosis, DM2..       This is in the setting of myasthenia gravis, increasing needs for assistance with ADLS, incontinence, hx of CVA, HTN, hyperlipidemia.  He has been hospitalized 2 times in the past 2 months for recurrent altered mental status and acute hypoxic respiratory failure due to infection. Patient has moved to inpatient care from LTC for higher level of care and needs help with at least one ADL. There is a documented unitentional weight loss of 91 pounds over the past 6 months.        Psychosocial/Spiritual Needs:  Pt was raised Christian. He has been attending [employer] with his  "wife.  Oriented to Spiritual Health and Social Work Services as part of Palliative Care team.  is following. Consultation placed for  to follow.     Decision-Making & Goals of Care:  Discussion/counseling today about goals of care/decisions:   2/6/2018 called to wife. She is aware that pt is moving back to [facility] and is waiting to confirm time of hospice meeting with her family tomorrow. She is agreeable to current plan of comfort care. Explained medications and interventions in place to help with his symptoms and she is agreeable to this plan. She will be coming to the hospital with her granddaughter before pt discharges. She is aware that I will review POLST with her prior to his d/c. Appreciate CC Vanessa and [facility] assistance to coordinate discharge today.   2/5/18 called wife who is home ill today. Introduced myself and explained palliative care.  Porsha tells me she spoke for a long time with the doctor yesterday and understands how sick her  is.  \"I don't want to pump him full of fluids and do a whole bunch of things to him, only for him to live for a month\" \"If he doesn't know where he is . . .want him to be kept comfortable\". She explains that she and Brant have been  for 45 years. He had 3 kids and she has 4. She tells me that his kids have been angry with her and blame her for putting him in the NH at [facility] last year. \"I can't put him in a NH, the doctor has to and did\". \"They won't talk to me\". \"they are all in [place] and live there, except for Brant who will come back to [place] after the winter\". Her kids are involved and supportive.  She hopes to keep him comfortable. If he survives, she would want him to return to [facility] with hospice care. She told [facility] she wanted them to hold his semiprivate room there \"and the nurses love him\". She notes that pt loves to read, but has noticed that he has had the same book open to the same place for several weeks, and the " nursing staff told her he has not been eating for a few days prior to pt.s admission. She was surprised when I told her his weight is down approximately 90# from last summer when he saw his primary care provider.      Patient has decision-making capacity Unreliable  Patient has no known legal document designating a decision maker. Per policy next of kin is the designated decision maker. See System Informed Consent policy for guidance.  Wife Porsha says there is a POA, but not a HCD or HCPOA.  Name: Porsha Clemente, Relationship: spouse     See ACP tab in pt's medical record for contact information.     Patient has a completed health care directive available in the chart (Y/N): N  Physician orders for life-sustaining treatment (POLST) form is on file but needs to be updated if pt discharges.  Code Status:                     Do not resuscitate / Do not intubate      Findings & plan of care discussed with: Dr. Johnson, Bedside nurse, Charge Nurse, SWS/CC.  Follow-up plan from palliative team: Will continue to support this pt for symptom management and pt and family for goals of care.  Thank you for involving us in the patient's care.       Maria Elena VIEYRA, CNS  Pain Management and Palliative Care  [facility]  Pgr: [phone]    Time Spent on this Encounter   I spent  30 minutes in assessment of the patient and discussion with the patient and family. Another 40 minutes in review of chart, documentation and discussion with the health care team.    Interval History   Reviewed chart. Opens eyes spontaneously and to voice. Does not follow commands. Still making urine. Tachycardia. Tachypnea. Pt is responding to interventions including medications for symptoms. Will schedule them where appropriate to help maximize pt comfort as he is not able to ask and family is not always present.    Review of Systems    Review of systems not obtained due to patient factors - mental status     Palliative Symptom  "Review (0=no symptom/no concern, 1=mild, 2=moderate, 3=severe):    Unable to obtain palliative symptom review due to pt mental status    Physical Exam   Pulse:  [106-131] 117  Heart Rate:  [] 125  Resp:  [20-32] 27  SpO2:  [93 %-97 %] 96 %  177 lbs 14.58 oz    GEN:  Drowsy, opens eyes to voice, SHOBHA orientation, intermittant moments of restlessness, NAD.  HEENT:  Normocephalic/atraumatic, no scleral icterus, no nasal discharge, mouth dry.  CV:  Tachy, regular, S1, S2; no murmurs or other irregularities noted.  +3 DP/PT pulses bilatererally; +1 edema BLE.  RESP:  RR 30/min, irregular. Occasional anteriorly to auscultation bilaterally.  Symmetric chest rise on inhalation noted.  Sl labored respiratory effort. Less cough noted.  ABD:  Rounded, soft, non-tender/non-distended.  +BS  EXT:  Edema & pulses as noted above.  CMS intact x 4.     M/S:   Non-Tender to palpation    SKIN:  Dry to touch, bruising on arms.    NEURO: Symmetric strength +5/5. ACOSTA but not to command.   Psych:  Somulent.  Moments of restlessness, otherwise calm, non-verbal.      Delirium Screen/CAM:  SHOBHA.    Medications     NaCl 10 mL/hr at 02/05/18 1829       glycopyrrolate  0.2-0.4 mg Intravenous Q4H     acetaminophen  650 mg Rectal Q6H     sodium chloride (PF)  10 mL Intracatheter Q7 Days     pyridostigmine  60 mg Per Feeding Tube Q4H ASCENCION     methylPREDNISolone  10 mg Intravenous Q24H       Data   Results for orders placed or performed during the hospital encounter of 02/03/18 (from the past 24 hour(s))   Social Work IP Consult    Narrative    Anabelle Irby, [facility]     2/5/2018  4:09 PM  D:  RAY responding to MD consult.  I/A: Spoke with pt's wife Yu via phone.  She is interested   in hospice services for \"Brant\" and would like the meeting to take   place at [facility] when pt is discharged.  Hospice will contact   Yu to schedule a meeting.  P: SW continues to be available.       "